# Patient Record
Sex: FEMALE | Race: WHITE | NOT HISPANIC OR LATINO | Employment: OTHER | URBAN - METROPOLITAN AREA
[De-identification: names, ages, dates, MRNs, and addresses within clinical notes are randomized per-mention and may not be internally consistent; named-entity substitution may affect disease eponyms.]

---

## 2017-01-04 ENCOUNTER — ALLSCRIPTS OFFICE VISIT (OUTPATIENT)
Dept: OTHER | Facility: OTHER | Age: 82
End: 2017-01-04

## 2017-01-17 ENCOUNTER — GENERIC CONVERSION - ENCOUNTER (OUTPATIENT)
Dept: OTHER | Facility: OTHER | Age: 82
End: 2017-01-17

## 2017-01-17 ENCOUNTER — APPOINTMENT (OUTPATIENT)
Dept: LAB | Facility: CLINIC | Age: 82
End: 2017-01-17
Payer: MEDICARE

## 2017-01-17 DIAGNOSIS — Z79.01 LONG TERM (CURRENT) USE OF ANTICOAGULANTS: ICD-10-CM

## 2017-01-17 DIAGNOSIS — I48.91 ATRIAL FIBRILLATION, UNSPECIFIED TYPE (HCC): ICD-10-CM

## 2017-01-17 DIAGNOSIS — M54.16 RADICULOPATHY OF LUMBAR REGION: ICD-10-CM

## 2017-01-17 DIAGNOSIS — I48.91 ATRIAL FIBRILLATION (HCC): ICD-10-CM

## 2017-01-17 DIAGNOSIS — E78.5 HYPERLIPIDEMIA: ICD-10-CM

## 2017-01-17 DIAGNOSIS — E11.9 TYPE 2 DIABETES MELLITUS WITHOUT COMPLICATIONS (HCC): ICD-10-CM

## 2017-01-17 DIAGNOSIS — I10 ESSENTIAL (PRIMARY) HYPERTENSION: ICD-10-CM

## 2017-01-17 LAB
ALBUMIN SERPL BCP-MCNC: 3.5 G/DL (ref 3.5–5)
ALP SERPL-CCNC: 45 U/L (ref 46–116)
ALT SERPL W P-5'-P-CCNC: 33 U/L (ref 12–78)
ANION GAP SERPL CALCULATED.3IONS-SCNC: 6 MMOL/L (ref 4–13)
AST SERPL W P-5'-P-CCNC: 19 U/L (ref 5–45)
BASOPHILS # BLD AUTO: 0.03 THOUSANDS/ΜL (ref 0–0.1)
BASOPHILS NFR BLD AUTO: 0 % (ref 0–1)
BILIRUB SERPL-MCNC: 0.54 MG/DL (ref 0.2–1)
BUN SERPL-MCNC: 22 MG/DL (ref 5–25)
CALCIUM SERPL-MCNC: 9.3 MG/DL (ref 8.3–10.1)
CHLORIDE SERPL-SCNC: 102 MMOL/L (ref 100–108)
CHOLEST SERPL-MCNC: 142 MG/DL (ref 50–200)
CO2 SERPL-SCNC: 32 MMOL/L (ref 21–32)
CREAT SERPL-MCNC: 0.71 MG/DL (ref 0.6–1.3)
CREAT UR-MCNC: 181 MG/DL
EOSINOPHIL # BLD AUTO: 0.08 THOUSAND/ΜL (ref 0–0.61)
EOSINOPHIL NFR BLD AUTO: 1 % (ref 0–6)
ERYTHROCYTE [DISTWIDTH] IN BLOOD BY AUTOMATED COUNT: 13.9 % (ref 11.6–15.1)
EST. AVERAGE GLUCOSE BLD GHB EST-MCNC: 105 MG/DL
GFR SERPL CREATININE-BSD FRML MDRD: >60 ML/MIN/1.73SQ M
GLUCOSE SERPL-MCNC: 116 MG/DL (ref 65–140)
HBA1C MFR BLD: 5.3 % (ref 4.2–6.3)
HCT VFR BLD AUTO: 41.6 % (ref 34.8–46.1)
HDLC SERPL-MCNC: 52 MG/DL (ref 40–60)
HGB BLD-MCNC: 13.5 G/DL (ref 11.5–15.4)
INR PPP: 3.63 (ref 0.86–1.16)
LDLC SERPL CALC-MCNC: 68 MG/DL (ref 0–100)
LYMPHOCYTES # BLD AUTO: 1.32 THOUSANDS/ΜL (ref 0.6–4.47)
LYMPHOCYTES NFR BLD AUTO: 19 % (ref 14–44)
MCH RBC QN AUTO: 29.7 PG (ref 26.8–34.3)
MCHC RBC AUTO-ENTMCNC: 32.5 G/DL (ref 31.4–37.4)
MCV RBC AUTO: 92 FL (ref 82–98)
MICROALBUMIN UR-MCNC: 60.6 MG/L (ref 0–20)
MICROALBUMIN/CREAT 24H UR: 33 MG/G CREATININE (ref 0–30)
MONOCYTES # BLD AUTO: 0.5 THOUSAND/ΜL (ref 0.17–1.22)
MONOCYTES NFR BLD AUTO: 7 % (ref 4–12)
NEUTROPHILS # BLD AUTO: 4.91 THOUSANDS/ΜL (ref 1.85–7.62)
NEUTS SEG NFR BLD AUTO: 73 % (ref 43–75)
NRBC BLD AUTO-RTO: 0 /100 WBCS
PLATELET # BLD AUTO: 293 THOUSANDS/UL (ref 149–390)
PMV BLD AUTO: 11 FL (ref 8.9–12.7)
POTASSIUM SERPL-SCNC: 3.6 MMOL/L (ref 3.5–5.3)
PROT SERPL-MCNC: 7.1 G/DL (ref 6.4–8.2)
PROTHROMBIN TIME: 35.3 SECONDS (ref 12–14.3)
RBC # BLD AUTO: 4.54 MILLION/UL (ref 3.81–5.12)
SODIUM SERPL-SCNC: 140 MMOL/L (ref 136–145)
TRIGL SERPL-MCNC: 111 MG/DL
TSH SERPL DL<=0.05 MIU/L-ACNC: 0.76 UIU/ML (ref 0.36–3.74)
WBC # BLD AUTO: 6.86 THOUSAND/UL (ref 4.31–10.16)

## 2017-01-17 PROCEDURE — 84443 ASSAY THYROID STIM HORMONE: CPT

## 2017-01-17 PROCEDURE — 36415 COLL VENOUS BLD VENIPUNCTURE: CPT

## 2017-01-17 PROCEDURE — 83036 HEMOGLOBIN GLYCOSYLATED A1C: CPT

## 2017-01-17 PROCEDURE — 80061 LIPID PANEL: CPT

## 2017-01-17 PROCEDURE — 80053 COMPREHEN METABOLIC PANEL: CPT

## 2017-01-17 PROCEDURE — 82570 ASSAY OF URINE CREATININE: CPT

## 2017-01-17 PROCEDURE — 85025 COMPLETE CBC W/AUTO DIFF WBC: CPT

## 2017-01-17 PROCEDURE — 85610 PROTHROMBIN TIME: CPT

## 2017-01-17 PROCEDURE — 82043 UR ALBUMIN QUANTITATIVE: CPT

## 2017-01-20 ENCOUNTER — GENERIC CONVERSION - ENCOUNTER (OUTPATIENT)
Dept: OTHER | Facility: OTHER | Age: 82
End: 2017-01-20

## 2017-01-25 ENCOUNTER — TRANSCRIBE ORDERS (OUTPATIENT)
Dept: ADMINISTRATIVE | Facility: HOSPITAL | Age: 82
End: 2017-01-25

## 2017-01-25 ENCOUNTER — HOSPITAL ENCOUNTER (OUTPATIENT)
Dept: MAMMOGRAPHY | Facility: MEDICAL CENTER | Age: 82
Discharge: HOME | End: 2017-01-25
Payer: MEDICARE

## 2017-01-25 ENCOUNTER — ALLSCRIPTS OFFICE VISIT (OUTPATIENT)
Dept: OTHER | Facility: OTHER | Age: 82
End: 2017-01-25

## 2017-01-25 ENCOUNTER — GENERIC CONVERSION - ENCOUNTER (OUTPATIENT)
Dept: OTHER | Facility: OTHER | Age: 82
End: 2017-01-25

## 2017-01-25 ENCOUNTER — APPOINTMENT (OUTPATIENT)
Dept: LAB | Facility: MEDICAL CENTER | Age: 82
End: 2017-01-25
Payer: MEDICARE

## 2017-01-25 DIAGNOSIS — I48.91 ATRIAL FIBRILLATION, UNSPECIFIED TYPE (HCC): ICD-10-CM

## 2017-01-25 DIAGNOSIS — Z12.31 ENCOUNTER FOR SCREENING MAMMOGRAM FOR MALIGNANT NEOPLASM OF BREAST: ICD-10-CM

## 2017-01-25 DIAGNOSIS — Z79.01 LONG TERM (CURRENT) USE OF ANTICOAGULANTS: ICD-10-CM

## 2017-01-25 LAB
INR PPP: 2.26 (ref 0.86–1.16)
PROTHROMBIN TIME: 24.7 SECONDS (ref 12–14.3)

## 2017-01-25 PROCEDURE — G0202 SCR MAMMO BI INCL CAD: HCPCS

## 2017-01-25 PROCEDURE — 36415 COLL VENOUS BLD VENIPUNCTURE: CPT

## 2017-01-25 PROCEDURE — 85610 PROTHROMBIN TIME: CPT

## 2017-01-27 ENCOUNTER — ALLSCRIPTS OFFICE VISIT (OUTPATIENT)
Dept: OTHER | Facility: OTHER | Age: 82
End: 2017-01-27

## 2017-02-08 ENCOUNTER — GENERIC CONVERSION - ENCOUNTER (OUTPATIENT)
Dept: OTHER | Facility: OTHER | Age: 82
End: 2017-02-08

## 2017-02-08 ENCOUNTER — APPOINTMENT (OUTPATIENT)
Dept: LAB | Facility: CLINIC | Age: 82
End: 2017-02-08
Payer: MEDICARE

## 2017-02-08 DIAGNOSIS — I48.91 ATRIAL FIBRILLATION, UNSPECIFIED TYPE (HCC): ICD-10-CM

## 2017-02-08 DIAGNOSIS — Z79.01 LONG TERM (CURRENT) USE OF ANTICOAGULANTS: ICD-10-CM

## 2017-02-08 LAB
INR PPP: 5.59 (ref 0.86–1.16)
PROTHROMBIN TIME: 49 SECONDS (ref 12–14.3)

## 2017-02-08 PROCEDURE — 85610 PROTHROMBIN TIME: CPT

## 2017-02-08 PROCEDURE — 36415 COLL VENOUS BLD VENIPUNCTURE: CPT

## 2017-02-10 ENCOUNTER — APPOINTMENT (OUTPATIENT)
Dept: LAB | Facility: CLINIC | Age: 82
End: 2017-02-10
Payer: MEDICARE

## 2017-02-10 ENCOUNTER — TRANSCRIBE ORDERS (OUTPATIENT)
Dept: LAB | Facility: CLINIC | Age: 82
End: 2017-02-10

## 2017-02-10 ENCOUNTER — GENERIC CONVERSION - ENCOUNTER (OUTPATIENT)
Dept: OTHER | Facility: OTHER | Age: 82
End: 2017-02-10

## 2017-02-10 DIAGNOSIS — Z79.01 LONG TERM (CURRENT) USE OF ANTICOAGULANTS: ICD-10-CM

## 2017-02-10 DIAGNOSIS — I48.91 ATRIAL FIBRILLATION, UNSPECIFIED TYPE (HCC): ICD-10-CM

## 2017-02-10 LAB
INR PPP: 2.87 (ref 0.86–1.16)
PROTHROMBIN TIME: 29.6 SECONDS (ref 12–14.3)

## 2017-02-10 PROCEDURE — 36415 COLL VENOUS BLD VENIPUNCTURE: CPT

## 2017-02-10 PROCEDURE — 85610 PROTHROMBIN TIME: CPT

## 2017-02-14 ENCOUNTER — GENERIC CONVERSION - ENCOUNTER (OUTPATIENT)
Dept: OTHER | Facility: OTHER | Age: 82
End: 2017-02-14

## 2017-02-14 ENCOUNTER — APPOINTMENT (OUTPATIENT)
Dept: LAB | Facility: CLINIC | Age: 82
End: 2017-02-14
Payer: MEDICARE

## 2017-02-14 DIAGNOSIS — Z79.01 LONG TERM (CURRENT) USE OF ANTICOAGULANTS: ICD-10-CM

## 2017-02-14 DIAGNOSIS — I48.91 ATRIAL FIBRILLATION, UNSPECIFIED TYPE (HCC): ICD-10-CM

## 2017-02-14 LAB
INR PPP: 1.47 (ref 0.86–1.16)
PROTHROMBIN TIME: 17.8 SECONDS (ref 12–14.3)

## 2017-02-14 PROCEDURE — 85610 PROTHROMBIN TIME: CPT

## 2017-02-14 PROCEDURE — 36415 COLL VENOUS BLD VENIPUNCTURE: CPT

## 2017-02-17 ENCOUNTER — APPOINTMENT (OUTPATIENT)
Dept: LAB | Facility: CLINIC | Age: 82
End: 2017-02-17
Payer: MEDICARE

## 2017-02-17 ENCOUNTER — TRANSCRIBE ORDERS (OUTPATIENT)
Dept: LAB | Facility: CLINIC | Age: 82
End: 2017-02-17

## 2017-02-17 ENCOUNTER — GENERIC CONVERSION - ENCOUNTER (OUTPATIENT)
Dept: OTHER | Facility: OTHER | Age: 82
End: 2017-02-17

## 2017-02-17 DIAGNOSIS — K42.9 UMBILICAL HERNIA WITHOUT OBSTRUCTION OR GANGRENE: Primary | ICD-10-CM

## 2017-02-17 DIAGNOSIS — Z95.2 HEART VALVE REPLACED BY TRANSPLANT: ICD-10-CM

## 2017-02-17 DIAGNOSIS — Z79.01 LONG TERM (CURRENT) USE OF ANTICOAGULANTS: ICD-10-CM

## 2017-02-17 DIAGNOSIS — I48.91 ATRIAL FIBRILLATION, UNSPECIFIED TYPE (HCC): ICD-10-CM

## 2017-02-17 LAB
INR PPP: 1.91 (ref 0.86–1.16)
PROTHROMBIN TIME: 21.7 SECONDS (ref 12–14.3)

## 2017-02-17 PROCEDURE — 85610 PROTHROMBIN TIME: CPT

## 2017-02-17 PROCEDURE — 36415 COLL VENOUS BLD VENIPUNCTURE: CPT

## 2017-02-24 ENCOUNTER — APPOINTMENT (OUTPATIENT)
Dept: LAB | Facility: CLINIC | Age: 82
End: 2017-02-24
Payer: MEDICARE

## 2017-02-24 ENCOUNTER — GENERIC CONVERSION - ENCOUNTER (OUTPATIENT)
Dept: OTHER | Facility: OTHER | Age: 82
End: 2017-02-24

## 2017-02-24 DIAGNOSIS — I48.91 ATRIAL FIBRILLATION, UNSPECIFIED TYPE (HCC): ICD-10-CM

## 2017-02-24 DIAGNOSIS — Z79.01 LONG TERM (CURRENT) USE OF ANTICOAGULANTS: ICD-10-CM

## 2017-02-24 LAB
INR PPP: 2.71 (ref 0.86–1.16)
PROTHROMBIN TIME: 28.3 SECONDS (ref 12–14.3)

## 2017-02-24 PROCEDURE — 36415 COLL VENOUS BLD VENIPUNCTURE: CPT

## 2017-02-24 PROCEDURE — 85610 PROTHROMBIN TIME: CPT

## 2017-02-27 ENCOUNTER — GENERIC CONVERSION - ENCOUNTER (OUTPATIENT)
Dept: OTHER | Facility: OTHER | Age: 82
End: 2017-02-27

## 2017-02-28 ENCOUNTER — APPOINTMENT (OUTPATIENT)
Dept: LAB | Facility: CLINIC | Age: 82
End: 2017-02-28
Payer: MEDICARE

## 2017-02-28 LAB
ALBUMIN SERPL BCP-MCNC: 3.6 G/DL (ref 3.5–5)
ALP SERPL-CCNC: 43 U/L (ref 46–116)
ALT SERPL W P-5'-P-CCNC: 28 U/L (ref 12–78)
ANION GAP SERPL CALCULATED.3IONS-SCNC: 4 MMOL/L (ref 4–13)
AST SERPL W P-5'-P-CCNC: 15 U/L (ref 5–45)
BASOPHILS # BLD AUTO: 0.03 THOUSANDS/ΜL (ref 0–0.1)
BASOPHILS NFR BLD AUTO: 0 % (ref 0–1)
BILIRUB SERPL-MCNC: 0.78 MG/DL (ref 0.2–1)
BUN SERPL-MCNC: 26 MG/DL (ref 5–25)
CALCIUM SERPL-MCNC: 9.5 MG/DL (ref 8.3–10.1)
CHLORIDE SERPL-SCNC: 103 MMOL/L (ref 100–108)
CO2 SERPL-SCNC: 33 MMOL/L (ref 21–32)
CREAT SERPL-MCNC: 0.8 MG/DL (ref 0.6–1.3)
EOSINOPHIL # BLD AUTO: 0.09 THOUSAND/ΜL (ref 0–0.61)
EOSINOPHIL NFR BLD AUTO: 1 % (ref 0–6)
ERYTHROCYTE [DISTWIDTH] IN BLOOD BY AUTOMATED COUNT: 14 % (ref 11.6–15.1)
GFR SERPL CREATININE-BSD FRML MDRD: >60 ML/MIN/1.73SQ M
GLUCOSE SERPL-MCNC: 110 MG/DL (ref 65–140)
HCT VFR BLD AUTO: 42.2 % (ref 34.8–46.1)
HGB BLD-MCNC: 13.9 G/DL (ref 11.5–15.4)
LYMPHOCYTES # BLD AUTO: 1.73 THOUSANDS/ΜL (ref 0.6–4.47)
LYMPHOCYTES NFR BLD AUTO: 22 % (ref 14–44)
MCH RBC QN AUTO: 29.9 PG (ref 26.8–34.3)
MCHC RBC AUTO-ENTMCNC: 32.9 G/DL (ref 31.4–37.4)
MCV RBC AUTO: 91 FL (ref 82–98)
MONOCYTES # BLD AUTO: 0.57 THOUSAND/ΜL (ref 0.17–1.22)
MONOCYTES NFR BLD AUTO: 7 % (ref 4–12)
NEUTROPHILS # BLD AUTO: 5.48 THOUSANDS/ΜL (ref 1.85–7.62)
NEUTS SEG NFR BLD AUTO: 70 % (ref 43–75)
NRBC BLD AUTO-RTO: 0 /100 WBCS
PLATELET # BLD AUTO: 304 THOUSANDS/UL (ref 149–390)
PMV BLD AUTO: 11.2 FL (ref 8.9–12.7)
POTASSIUM SERPL-SCNC: 3.7 MMOL/L (ref 3.5–5.3)
PROT SERPL-MCNC: 7.1 G/DL (ref 6.4–8.2)
RBC # BLD AUTO: 4.65 MILLION/UL (ref 3.81–5.12)
SODIUM SERPL-SCNC: 140 MMOL/L (ref 136–145)
WBC # BLD AUTO: 7.91 THOUSAND/UL (ref 4.31–10.16)

## 2017-02-28 PROCEDURE — 85025 COMPLETE CBC W/AUTO DIFF WBC: CPT

## 2017-02-28 PROCEDURE — 80053 COMPREHEN METABOLIC PANEL: CPT

## 2017-02-28 PROCEDURE — 36415 COLL VENOUS BLD VENIPUNCTURE: CPT

## 2017-03-03 ENCOUNTER — ALLSCRIPTS OFFICE VISIT (OUTPATIENT)
Dept: OTHER | Facility: OTHER | Age: 82
End: 2017-03-03

## 2017-03-09 ENCOUNTER — GENERIC CONVERSION - ENCOUNTER (OUTPATIENT)
Dept: OTHER | Facility: OTHER | Age: 82
End: 2017-03-09

## 2017-03-09 ENCOUNTER — APPOINTMENT (OUTPATIENT)
Dept: LAB | Facility: CLINIC | Age: 82
End: 2017-03-09
Payer: MEDICARE

## 2017-03-09 DIAGNOSIS — I48.91 ATRIAL FIBRILLATION, UNSPECIFIED TYPE (HCC): ICD-10-CM

## 2017-03-09 DIAGNOSIS — Z79.01 LONG TERM (CURRENT) USE OF ANTICOAGULANTS: ICD-10-CM

## 2017-03-09 LAB
INR PPP: 2.63 (ref 0.86–1.16)
PROTHROMBIN TIME: 27.7 SECONDS (ref 12–14.3)

## 2017-03-09 PROCEDURE — 36415 COLL VENOUS BLD VENIPUNCTURE: CPT

## 2017-03-09 PROCEDURE — 85610 PROTHROMBIN TIME: CPT

## 2017-03-16 ENCOUNTER — APPOINTMENT (OUTPATIENT)
Dept: LAB | Facility: CLINIC | Age: 82
End: 2017-03-16
Payer: MEDICARE

## 2017-03-16 ENCOUNTER — GENERIC CONVERSION - ENCOUNTER (OUTPATIENT)
Dept: OTHER | Facility: OTHER | Age: 82
End: 2017-03-16

## 2017-03-16 LAB
INR PPP: 1.22 (ref 0.86–1.16)
PROTHROMBIN TIME: 15.5 SECONDS (ref 12–14.3)

## 2017-03-16 PROCEDURE — 36415 COLL VENOUS BLD VENIPUNCTURE: CPT

## 2017-03-16 PROCEDURE — 85610 PROTHROMBIN TIME: CPT

## 2017-03-17 ENCOUNTER — HOSPITAL ENCOUNTER (OUTPATIENT)
Facility: HOSPITAL | Age: 82
Setting detail: OUTPATIENT SURGERY
Discharge: HOME | End: 2017-03-17
Attending: SURGERY | Admitting: SURGERY
Payer: MEDICARE

## 2017-03-17 ENCOUNTER — ANESTHESIA EVENT (OUTPATIENT)
Dept: PERIOP | Facility: HOSPITAL | Age: 82
End: 2017-03-17
Payer: MEDICARE

## 2017-03-17 ENCOUNTER — ANESTHESIA (OUTPATIENT)
Dept: PERIOP | Facility: HOSPITAL | Age: 82
End: 2017-03-17
Payer: MEDICARE

## 2017-03-17 VITALS
DIASTOLIC BLOOD PRESSURE: 67 MMHG | RESPIRATION RATE: 18 BRPM | SYSTOLIC BLOOD PRESSURE: 146 MMHG | HEART RATE: 54 BPM | OXYGEN SATURATION: 97 % | TEMPERATURE: 97.1 F

## 2017-03-17 PROCEDURE — C1781 MESH (IMPLANTABLE): HCPCS | Performed by: SURGERY

## 2017-03-17 DEVICE — VENTRALEX HERNIA PATCH, 6.4 CM (2.5"), MEDIUM CIRCLE WITH STRAP
Type: IMPLANTABLE DEVICE | Site: ABDOMEN | Status: FUNCTIONAL
Brand: VENTRALEX

## 2017-03-17 RX ORDER — SODIUM CHLORIDE, SODIUM LACTATE, POTASSIUM CHLORIDE, CALCIUM CHLORIDE 600; 310; 30; 20 MG/100ML; MG/100ML; MG/100ML; MG/100ML
INJECTION, SOLUTION INTRAVENOUS CONTINUOUS PRN
Status: DISCONTINUED | OUTPATIENT
Start: 2017-03-17 | End: 2017-03-17 | Stop reason: SURG

## 2017-03-17 RX ORDER — MORPHINE SULFATE 4 MG/ML
4 INJECTION, SOLUTION INTRAMUSCULAR; INTRAVENOUS
Status: DISCONTINUED | OUTPATIENT
Start: 2017-03-17 | End: 2017-03-17 | Stop reason: HOSPADM

## 2017-03-17 RX ORDER — WARFARIN SODIUM 5 MG/1
5 TABLET ORAL
COMMUNITY
End: 2018-01-24 | Stop reason: SDUPTHER

## 2017-03-17 RX ORDER — TRAMADOL HYDROCHLORIDE 50 MG/1
50 TABLET ORAL EVERY 6 HOURS PRN
COMMUNITY
End: 2018-01-24

## 2017-03-17 RX ORDER — PROPOFOL 10 MG/ML
INJECTION, EMULSION INTRAVENOUS AS NEEDED
Status: DISCONTINUED | OUTPATIENT
Start: 2017-03-17 | End: 2017-03-17 | Stop reason: SURG

## 2017-03-17 RX ORDER — FENTANYL CITRATE/PF 50 MCG/ML
25 SYRINGE (ML) INJECTION
Status: DISCONTINUED | OUTPATIENT
Start: 2017-03-17 | End: 2017-03-17 | Stop reason: HOSPADM

## 2017-03-17 RX ORDER — OXYCODONE HYDROCHLORIDE AND ACETAMINOPHEN 5; 325 MG/1; MG/1
2 TABLET ORAL EVERY 4 HOURS PRN
Status: DISCONTINUED | OUTPATIENT
Start: 2017-03-17 | End: 2017-03-17 | Stop reason: HOSPADM

## 2017-03-17 RX ORDER — FENTANYL CITRATE 50 UG/ML
INJECTION, SOLUTION INTRAMUSCULAR; INTRAVENOUS AS NEEDED
Status: DISCONTINUED | OUTPATIENT
Start: 2017-03-17 | End: 2017-03-17 | Stop reason: SURG

## 2017-03-17 RX ORDER — METFORMIN HYDROCHLORIDE EXTENDED-RELEASE TABLETS 500 MG/1
500 TABLET, FILM COATED, EXTENDED RELEASE ORAL
COMMUNITY
End: 2018-01-24

## 2017-03-17 RX ORDER — SODIUM CHLORIDE, SODIUM LACTATE, POTASSIUM CHLORIDE, CALCIUM CHLORIDE 600; 310; 30; 20 MG/100ML; MG/100ML; MG/100ML; MG/100ML
75 INJECTION, SOLUTION INTRAVENOUS CONTINUOUS
Status: DISCONTINUED | OUTPATIENT
Start: 2017-03-17 | End: 2017-03-17 | Stop reason: HOSPADM

## 2017-03-17 RX ORDER — ONDANSETRON 2 MG/ML
INJECTION INTRAMUSCULAR; INTRAVENOUS AS NEEDED
Status: DISCONTINUED | OUTPATIENT
Start: 2017-03-17 | End: 2017-03-17 | Stop reason: SURG

## 2017-03-17 RX ORDER — ATENOLOL 50 MG/1
50 TABLET ORAL DAILY
COMMUNITY
End: 2018-02-02 | Stop reason: SDUPTHER

## 2017-03-17 RX ORDER — ONDANSETRON 2 MG/ML
4 INJECTION INTRAMUSCULAR; INTRAVENOUS EVERY 4 HOURS PRN
Status: DISCONTINUED | OUTPATIENT
Start: 2017-03-17 | End: 2017-03-17 | Stop reason: HOSPADM

## 2017-03-17 RX ORDER — EPHEDRINE SULFATE 50 MG/ML
INJECTION, SOLUTION INTRAVENOUS AS NEEDED
Status: DISCONTINUED | OUTPATIENT
Start: 2017-03-17 | End: 2017-03-17 | Stop reason: SURG

## 2017-03-17 RX ORDER — BUPIVACAINE HYDROCHLORIDE AND EPINEPHRINE 2.5; 5 MG/ML; UG/ML
INJECTION, SOLUTION EPIDURAL; INFILTRATION; INTRACAUDAL; PERINEURAL AS NEEDED
Status: DISCONTINUED | OUTPATIENT
Start: 2017-03-17 | End: 2017-03-17 | Stop reason: HOSPADM

## 2017-03-17 RX ORDER — GABAPENTIN 300 MG/1
300 CAPSULE ORAL 3 TIMES DAILY
COMMUNITY
End: 2018-01-24

## 2017-03-17 RX ORDER — ONDANSETRON 2 MG/ML
4 INJECTION INTRAMUSCULAR; INTRAVENOUS ONCE AS NEEDED
Status: DISCONTINUED | OUTPATIENT
Start: 2017-03-17 | End: 2017-03-17 | Stop reason: HOSPADM

## 2017-03-17 RX ORDER — PRAVASTATIN SODIUM 40 MG
40 TABLET ORAL DAILY
COMMUNITY
End: 2018-01-24

## 2017-03-17 RX ADMIN — FENTANYL CITRATE 25 MCG: 50 INJECTION INTRAMUSCULAR; INTRAVENOUS at 14:01

## 2017-03-17 RX ADMIN — SODIUM CHLORIDE, SODIUM LACTATE, POTASSIUM CHLORIDE, AND CALCIUM CHLORIDE: .6; .31; .03; .02 INJECTION, SOLUTION INTRAVENOUS at 13:50

## 2017-03-17 RX ADMIN — LIDOCAINE HYDROCHLORIDE 100 MG: 20 INJECTION, SOLUTION INTRAVENOUS at 13:56

## 2017-03-17 RX ADMIN — ONDANSETRON 4 MG: 2 INJECTION INTRAMUSCULAR; INTRAVENOUS at 13:56

## 2017-03-17 RX ADMIN — EPHEDRINE SULFATE 5 MG: 50 INJECTION, SOLUTION INTRAMUSCULAR; INTRAVENOUS; SUBCUTANEOUS at 14:07

## 2017-03-17 RX ADMIN — FENTANYL CITRATE 25 MCG: 50 INJECTION INTRAMUSCULAR; INTRAVENOUS at 14:10

## 2017-03-17 RX ADMIN — CEFAZOLIN SODIUM 1000 MG: 1 SOLUTION INTRAVENOUS at 14:01

## 2017-03-17 RX ADMIN — FENTANYL CITRATE 25 MCG: 50 INJECTION INTRAMUSCULAR; INTRAVENOUS at 13:56

## 2017-03-17 RX ADMIN — PROPOFOL 150 MG: 10 INJECTION, EMULSION INTRAVENOUS at 13:56

## 2017-03-17 RX ADMIN — FENTANYL CITRATE 25 MCG: 50 INJECTION INTRAMUSCULAR; INTRAVENOUS at 14:15

## 2017-03-20 ENCOUNTER — GENERIC CONVERSION - ENCOUNTER (OUTPATIENT)
Dept: OTHER | Facility: OTHER | Age: 82
End: 2017-03-20

## 2017-03-20 ENCOUNTER — APPOINTMENT (OUTPATIENT)
Dept: LAB | Facility: CLINIC | Age: 82
End: 2017-03-20
Payer: MEDICARE

## 2017-03-20 DIAGNOSIS — I48.91 ATRIAL FIBRILLATION, UNSPECIFIED TYPE (HCC): ICD-10-CM

## 2017-03-20 DIAGNOSIS — Z79.01 LONG TERM (CURRENT) USE OF ANTICOAGULANTS: ICD-10-CM

## 2017-03-20 LAB
INR PPP: 1.83 (ref 0.86–1.16)
PROTHROMBIN TIME: 21 SECONDS (ref 12–14.3)

## 2017-03-20 PROCEDURE — 85610 PROTHROMBIN TIME: CPT

## 2017-03-20 PROCEDURE — 36415 COLL VENOUS BLD VENIPUNCTURE: CPT

## 2017-03-23 ENCOUNTER — APPOINTMENT (OUTPATIENT)
Dept: LAB | Facility: CLINIC | Age: 82
End: 2017-03-23
Payer: MEDICARE

## 2017-03-23 ENCOUNTER — TRANSCRIBE ORDERS (OUTPATIENT)
Dept: LAB | Facility: CLINIC | Age: 82
End: 2017-03-23

## 2017-03-23 ENCOUNTER — GENERIC CONVERSION - ENCOUNTER (OUTPATIENT)
Dept: OTHER | Facility: OTHER | Age: 82
End: 2017-03-23

## 2017-03-23 DIAGNOSIS — Z79.01 LONG TERM (CURRENT) USE OF ANTICOAGULANTS: Primary | ICD-10-CM

## 2017-03-23 DIAGNOSIS — Z95.2 HEART VALVE REPLACED BY TRANSPLANT: ICD-10-CM

## 2017-03-23 DIAGNOSIS — I48.91 ATRIAL FIBRILLATION, UNSPECIFIED TYPE (HCC): ICD-10-CM

## 2017-03-23 LAB
INR PPP: 2.72 (ref 0.86–1.16)
PROTHROMBIN TIME: 28.4 SECONDS (ref 12–14.3)

## 2017-03-23 PROCEDURE — 85610 PROTHROMBIN TIME: CPT

## 2017-03-23 PROCEDURE — 36415 COLL VENOUS BLD VENIPUNCTURE: CPT

## 2017-03-30 ENCOUNTER — APPOINTMENT (OUTPATIENT)
Dept: LAB | Facility: CLINIC | Age: 82
End: 2017-03-30
Payer: MEDICARE

## 2017-03-30 ENCOUNTER — GENERIC CONVERSION - ENCOUNTER (OUTPATIENT)
Dept: OTHER | Facility: OTHER | Age: 82
End: 2017-03-30

## 2017-03-30 DIAGNOSIS — I48.91 ATRIAL FIBRILLATION, UNSPECIFIED TYPE (HCC): ICD-10-CM

## 2017-03-30 DIAGNOSIS — Z79.01 LONG TERM (CURRENT) USE OF ANTICOAGULANTS: ICD-10-CM

## 2017-03-30 LAB
INR PPP: 3.03 (ref 0.86–1.16)
PROTHROMBIN TIME: 30.8 SECONDS (ref 12–14.3)

## 2017-03-30 PROCEDURE — 85610 PROTHROMBIN TIME: CPT

## 2017-03-30 PROCEDURE — 36415 COLL VENOUS BLD VENIPUNCTURE: CPT

## 2017-04-03 ENCOUNTER — GENERIC CONVERSION - ENCOUNTER (OUTPATIENT)
Dept: OTHER | Facility: OTHER | Age: 82
End: 2017-04-03

## 2017-04-17 ENCOUNTER — APPOINTMENT (OUTPATIENT)
Dept: LAB | Facility: CLINIC | Age: 82
End: 2017-04-17
Payer: MEDICARE

## 2017-04-17 ENCOUNTER — GENERIC CONVERSION - ENCOUNTER (OUTPATIENT)
Dept: OTHER | Facility: OTHER | Age: 82
End: 2017-04-17

## 2017-04-17 DIAGNOSIS — Z79.01 LONG TERM (CURRENT) USE OF ANTICOAGULANTS: ICD-10-CM

## 2017-04-17 DIAGNOSIS — I48.91 ATRIAL FIBRILLATION, UNSPECIFIED TYPE (HCC): ICD-10-CM

## 2017-04-17 LAB
INR PPP: 3.63 (ref 0.86–1.16)
PROTHROMBIN TIME: 35.3 SECONDS (ref 12–14.3)

## 2017-04-17 PROCEDURE — 36415 COLL VENOUS BLD VENIPUNCTURE: CPT

## 2017-04-17 PROCEDURE — 85610 PROTHROMBIN TIME: CPT

## 2017-04-19 ENCOUNTER — ALLSCRIPTS OFFICE VISIT (OUTPATIENT)
Dept: OTHER | Facility: OTHER | Age: 82
End: 2017-04-19

## 2017-04-19 DIAGNOSIS — Z79.891 LONG TERM CURRENT USE OF OPIATE ANALGESIC: ICD-10-CM

## 2017-04-19 DIAGNOSIS — G89.4 CHRONIC PAIN SYNDROME: ICD-10-CM

## 2017-04-19 DIAGNOSIS — F11.20 UNCOMPLICATED OPIOID DEPENDENCE (HCC): ICD-10-CM

## 2017-04-26 ENCOUNTER — APPOINTMENT (OUTPATIENT)
Dept: LAB | Facility: CLINIC | Age: 82
End: 2017-04-26
Payer: MEDICARE

## 2017-04-26 ENCOUNTER — GENERIC CONVERSION - ENCOUNTER (OUTPATIENT)
Dept: OTHER | Facility: OTHER | Age: 82
End: 2017-04-26

## 2017-04-26 ENCOUNTER — TRANSCRIBE ORDERS (OUTPATIENT)
Dept: LAB | Facility: CLINIC | Age: 82
End: 2017-04-26

## 2017-04-26 DIAGNOSIS — I48.91 ATRIAL FIBRILLATION, UNSPECIFIED TYPE (HCC): ICD-10-CM

## 2017-04-26 DIAGNOSIS — Z79.01 LONG TERM (CURRENT) USE OF ANTICOAGULANTS: ICD-10-CM

## 2017-04-26 LAB
INR PPP: 1.69 (ref 0.86–1.16)
PROTHROMBIN TIME: 19.8 SECONDS (ref 12–14.3)

## 2017-04-26 PROCEDURE — 85610 PROTHROMBIN TIME: CPT

## 2017-04-26 PROCEDURE — 36415 COLL VENOUS BLD VENIPUNCTURE: CPT

## 2017-05-03 ENCOUNTER — GENERIC CONVERSION - ENCOUNTER (OUTPATIENT)
Dept: OTHER | Facility: OTHER | Age: 82
End: 2017-05-03

## 2017-05-03 ENCOUNTER — APPOINTMENT (OUTPATIENT)
Dept: LAB | Facility: CLINIC | Age: 82
End: 2017-05-03
Payer: MEDICARE

## 2017-05-03 DIAGNOSIS — I48.91 ATRIAL FIBRILLATION, UNSPECIFIED TYPE (HCC): ICD-10-CM

## 2017-05-03 DIAGNOSIS — Z79.01 LONG TERM (CURRENT) USE OF ANTICOAGULANTS: ICD-10-CM

## 2017-05-03 LAB
INR PPP: 1.78 (ref 0.86–1.16)
PROTHROMBIN TIME: 20.9 SECONDS (ref 12.1–14.4)

## 2017-05-03 PROCEDURE — 36415 COLL VENOUS BLD VENIPUNCTURE: CPT

## 2017-05-03 PROCEDURE — 85610 PROTHROMBIN TIME: CPT

## 2017-05-10 ENCOUNTER — APPOINTMENT (OUTPATIENT)
Dept: LAB | Facility: CLINIC | Age: 82
End: 2017-05-10
Payer: MEDICARE

## 2017-05-10 ENCOUNTER — GENERIC CONVERSION - ENCOUNTER (OUTPATIENT)
Dept: OTHER | Facility: OTHER | Age: 82
End: 2017-05-10

## 2017-05-10 DIAGNOSIS — I48.91 ATRIAL FIBRILLATION, UNSPECIFIED TYPE (HCC): ICD-10-CM

## 2017-05-10 DIAGNOSIS — Z79.01 LONG TERM (CURRENT) USE OF ANTICOAGULANTS: ICD-10-CM

## 2017-05-10 LAB
INR PPP: 2.74 (ref 0.86–1.16)
PROTHROMBIN TIME: 29.4 SECONDS (ref 12.1–14.4)

## 2017-05-10 PROCEDURE — 85610 PROTHROMBIN TIME: CPT

## 2017-05-10 PROCEDURE — 36415 COLL VENOUS BLD VENIPUNCTURE: CPT

## 2017-05-24 ENCOUNTER — GENERIC CONVERSION - ENCOUNTER (OUTPATIENT)
Dept: OTHER | Facility: OTHER | Age: 82
End: 2017-05-24

## 2017-05-24 ENCOUNTER — APPOINTMENT (OUTPATIENT)
Dept: LAB | Facility: CLINIC | Age: 82
End: 2017-05-24
Payer: MEDICARE

## 2017-05-24 DIAGNOSIS — I48.91 ATRIAL FIBRILLATION, UNSPECIFIED TYPE (HCC): ICD-10-CM

## 2017-05-24 DIAGNOSIS — Z79.01 LONG TERM (CURRENT) USE OF ANTICOAGULANTS: ICD-10-CM

## 2017-05-24 LAB
INR PPP: 2.24 (ref 0.86–1.16)
PROTHROMBIN TIME: 25 SECONDS (ref 12.1–14.4)

## 2017-05-24 PROCEDURE — 85610 PROTHROMBIN TIME: CPT

## 2017-05-24 PROCEDURE — 36415 COLL VENOUS BLD VENIPUNCTURE: CPT

## 2017-05-27 DIAGNOSIS — I10 ESSENTIAL (PRIMARY) HYPERTENSION: ICD-10-CM

## 2017-05-27 DIAGNOSIS — E11.9 TYPE 2 DIABETES MELLITUS WITHOUT COMPLICATIONS (HCC): ICD-10-CM

## 2017-05-27 DIAGNOSIS — I48.91 ATRIAL FIBRILLATION (HCC): ICD-10-CM

## 2017-05-27 DIAGNOSIS — E55.9 VITAMIN D DEFICIENCY: ICD-10-CM

## 2017-05-27 DIAGNOSIS — E78.5 HYPERLIPIDEMIA: ICD-10-CM

## 2017-05-27 DIAGNOSIS — I50.9 HEART FAILURE (HCC): ICD-10-CM

## 2017-06-12 ENCOUNTER — TRANSCRIBE ORDERS (OUTPATIENT)
Dept: LAB | Facility: CLINIC | Age: 82
End: 2017-06-12

## 2017-06-12 ENCOUNTER — APPOINTMENT (OUTPATIENT)
Dept: LAB | Facility: CLINIC | Age: 82
End: 2017-06-12
Payer: MEDICARE

## 2017-06-12 DIAGNOSIS — I48.91 ATRIAL FIBRILLATION, UNSPECIFIED TYPE (HCC): ICD-10-CM

## 2017-06-12 DIAGNOSIS — Z79.01 LONG TERM (CURRENT) USE OF ANTICOAGULANTS: ICD-10-CM

## 2017-06-12 LAB
INR PPP: 5.6 (ref 0.86–1.16)
PROTHROMBIN TIME: 51.8 SECONDS (ref 12.1–14.4)

## 2017-06-12 PROCEDURE — 36415 COLL VENOUS BLD VENIPUNCTURE: CPT

## 2017-06-12 PROCEDURE — 85610 PROTHROMBIN TIME: CPT

## 2017-06-14 ENCOUNTER — APPOINTMENT (OUTPATIENT)
Dept: LAB | Facility: CLINIC | Age: 82
End: 2017-06-14
Payer: MEDICARE

## 2017-06-14 ENCOUNTER — GENERIC CONVERSION - ENCOUNTER (OUTPATIENT)
Dept: OTHER | Facility: OTHER | Age: 82
End: 2017-06-14

## 2017-06-14 DIAGNOSIS — Z79.01 LONG TERM (CURRENT) USE OF ANTICOAGULANTS: ICD-10-CM

## 2017-06-14 DIAGNOSIS — I48.91 ATRIAL FIBRILLATION, UNSPECIFIED TYPE (HCC): ICD-10-CM

## 2017-06-14 LAB
INR PPP: 2.18 (ref 0.86–1.16)
PROTHROMBIN TIME: 24.5 SECONDS (ref 12.1–14.4)

## 2017-06-14 PROCEDURE — 36415 COLL VENOUS BLD VENIPUNCTURE: CPT

## 2017-06-14 PROCEDURE — 85610 PROTHROMBIN TIME: CPT

## 2017-06-19 ENCOUNTER — ALLSCRIPTS OFFICE VISIT (OUTPATIENT)
Dept: OTHER | Facility: OTHER | Age: 82
End: 2017-06-19

## 2017-06-21 ENCOUNTER — GENERIC CONVERSION - ENCOUNTER (OUTPATIENT)
Dept: OTHER | Facility: OTHER | Age: 82
End: 2017-06-21

## 2017-06-21 ENCOUNTER — APPOINTMENT (OUTPATIENT)
Dept: LAB | Facility: CLINIC | Age: 82
End: 2017-06-21
Payer: MEDICARE

## 2017-06-21 DIAGNOSIS — I48.91 ATRIAL FIBRILLATION, UNSPECIFIED TYPE (HCC): ICD-10-CM

## 2017-06-21 DIAGNOSIS — Z79.01 LONG TERM (CURRENT) USE OF ANTICOAGULANTS: ICD-10-CM

## 2017-06-21 LAB
INR PPP: 1.65 (ref 0.86–1.16)
PROTHROMBIN TIME: 19.6 SECONDS (ref 12.1–14.4)

## 2017-06-21 PROCEDURE — 85610 PROTHROMBIN TIME: CPT

## 2017-06-21 PROCEDURE — 36415 COLL VENOUS BLD VENIPUNCTURE: CPT

## 2017-06-28 ENCOUNTER — GENERIC CONVERSION - ENCOUNTER (OUTPATIENT)
Dept: OTHER | Facility: OTHER | Age: 82
End: 2017-06-28

## 2017-06-28 ENCOUNTER — APPOINTMENT (OUTPATIENT)
Dept: LAB | Facility: CLINIC | Age: 82
End: 2017-06-28
Payer: MEDICARE

## 2017-06-28 DIAGNOSIS — I48.91 ATRIAL FIBRILLATION, UNSPECIFIED TYPE (HCC): ICD-10-CM

## 2017-06-28 DIAGNOSIS — Z79.01 LONG TERM (CURRENT) USE OF ANTICOAGULANTS: ICD-10-CM

## 2017-06-28 LAB
INR PPP: 2.51 (ref 0.86–1.16)
PROTHROMBIN TIME: 27.4 SECONDS (ref 12.1–14.4)

## 2017-06-28 PROCEDURE — 85610 PROTHROMBIN TIME: CPT

## 2017-06-28 PROCEDURE — 36415 COLL VENOUS BLD VENIPUNCTURE: CPT

## 2017-07-05 ENCOUNTER — ALLSCRIPTS OFFICE VISIT (OUTPATIENT)
Dept: OTHER | Facility: OTHER | Age: 82
End: 2017-07-05

## 2017-07-05 DIAGNOSIS — E78.5 HYPERLIPIDEMIA: ICD-10-CM

## 2017-07-10 ENCOUNTER — GENERIC CONVERSION - ENCOUNTER (OUTPATIENT)
Dept: OTHER | Facility: OTHER | Age: 82
End: 2017-07-10

## 2017-07-10 ENCOUNTER — APPOINTMENT (OUTPATIENT)
Dept: LAB | Facility: CLINIC | Age: 82
End: 2017-07-10
Payer: MEDICARE

## 2017-07-10 DIAGNOSIS — Z79.01 LONG TERM (CURRENT) USE OF ANTICOAGULANTS: ICD-10-CM

## 2017-07-10 DIAGNOSIS — I48.91 ATRIAL FIBRILLATION, UNSPECIFIED TYPE (HCC): ICD-10-CM

## 2017-07-10 LAB
INR PPP: 2.68 (ref 0.86–1.16)
PROTHROMBIN TIME: 28.9 SECONDS (ref 12.1–14.4)

## 2017-07-10 PROCEDURE — 85610 PROTHROMBIN TIME: CPT

## 2017-07-10 PROCEDURE — 36415 COLL VENOUS BLD VENIPUNCTURE: CPT

## 2017-07-12 ENCOUNTER — ALLSCRIPTS OFFICE VISIT (OUTPATIENT)
Dept: OTHER | Facility: OTHER | Age: 82
End: 2017-07-12

## 2017-07-31 ENCOUNTER — GENERIC CONVERSION - ENCOUNTER (OUTPATIENT)
Dept: OTHER | Facility: OTHER | Age: 82
End: 2017-07-31

## 2017-07-31 ENCOUNTER — TRANSCRIBE ORDERS (OUTPATIENT)
Dept: LAB | Facility: CLINIC | Age: 82
End: 2017-07-31

## 2017-07-31 ENCOUNTER — APPOINTMENT (OUTPATIENT)
Dept: LAB | Facility: CLINIC | Age: 82
End: 2017-07-31
Payer: MEDICARE

## 2017-07-31 DIAGNOSIS — I48.91 ATRIAL FIBRILLATION, UNSPECIFIED TYPE (HCC): ICD-10-CM

## 2017-07-31 DIAGNOSIS — Z79.01 LONG TERM (CURRENT) USE OF ANTICOAGULANTS: ICD-10-CM

## 2017-07-31 LAB
INR PPP: 3.17 (ref 0.86–1.16)
PROTHROMBIN TIME: 33 SECONDS (ref 12.1–14.4)

## 2017-07-31 PROCEDURE — 36415 COLL VENOUS BLD VENIPUNCTURE: CPT

## 2017-07-31 PROCEDURE — 85610 PROTHROMBIN TIME: CPT

## 2017-08-28 ENCOUNTER — TRANSCRIBE ORDERS (OUTPATIENT)
Dept: LAB | Facility: CLINIC | Age: 82
End: 2017-08-28

## 2017-08-28 ENCOUNTER — GENERIC CONVERSION - ENCOUNTER (OUTPATIENT)
Dept: OTHER | Facility: OTHER | Age: 82
End: 2017-08-28

## 2017-08-28 ENCOUNTER — APPOINTMENT (OUTPATIENT)
Dept: LAB | Facility: CLINIC | Age: 82
End: 2017-08-28
Payer: MEDICARE

## 2017-08-28 DIAGNOSIS — Z95.2 HEART VALVE REPLACED BY TRANSPLANT: ICD-10-CM

## 2017-08-28 DIAGNOSIS — I48.91 ATRIAL FIBRILLATION, UNSPECIFIED TYPE (HCC): ICD-10-CM

## 2017-08-28 DIAGNOSIS — Z79.01 LONG TERM (CURRENT) USE OF ANTICOAGULANTS: ICD-10-CM

## 2017-08-28 DIAGNOSIS — Z79.01 LONG TERM (CURRENT) USE OF ANTICOAGULANTS: Primary | ICD-10-CM

## 2017-08-28 LAB
INR PPP: 3.32 (ref 0.86–1.16)
PROTHROMBIN TIME: 34.2 SECONDS (ref 12.1–14.4)

## 2017-08-28 PROCEDURE — 85610 PROTHROMBIN TIME: CPT

## 2017-08-28 PROCEDURE — 36415 COLL VENOUS BLD VENIPUNCTURE: CPT

## 2017-09-26 ENCOUNTER — APPOINTMENT (OUTPATIENT)
Dept: LAB | Facility: CLINIC | Age: 82
End: 2017-09-26
Payer: MEDICARE

## 2017-09-26 ENCOUNTER — GENERIC CONVERSION - ENCOUNTER (OUTPATIENT)
Dept: OTHER | Facility: OTHER | Age: 82
End: 2017-09-26

## 2017-09-26 DIAGNOSIS — Z79.01 LONG TERM (CURRENT) USE OF ANTICOAGULANTS: ICD-10-CM

## 2017-09-26 DIAGNOSIS — I48.91 ATRIAL FIBRILLATION, UNSPECIFIED TYPE (HCC): ICD-10-CM

## 2017-09-26 LAB
INR PPP: 3.87 (ref 0.86–1.16)
PROTHROMBIN TIME: 38.6 SECONDS (ref 12.1–14.4)

## 2017-09-26 PROCEDURE — 85610 PROTHROMBIN TIME: CPT

## 2017-09-26 PROCEDURE — 36415 COLL VENOUS BLD VENIPUNCTURE: CPT

## 2017-10-03 ENCOUNTER — APPOINTMENT (OUTPATIENT)
Dept: LAB | Facility: CLINIC | Age: 82
End: 2017-10-03
Payer: MEDICARE

## 2017-10-03 ENCOUNTER — GENERIC CONVERSION - ENCOUNTER (OUTPATIENT)
Dept: OTHER | Facility: OTHER | Age: 82
End: 2017-10-03

## 2017-10-03 ENCOUNTER — TRANSCRIBE ORDERS (OUTPATIENT)
Dept: LAB | Facility: CLINIC | Age: 82
End: 2017-10-03

## 2017-10-03 DIAGNOSIS — I48.91 ATRIAL FIBRILLATION, UNSPECIFIED TYPE (HCC): ICD-10-CM

## 2017-10-03 DIAGNOSIS — Z79.01 LONG TERM (CURRENT) USE OF ANTICOAGULANTS: ICD-10-CM

## 2017-10-03 LAB
INR PPP: 3.06 (ref 0.86–1.16)
PROTHROMBIN TIME: 32.1 SECONDS (ref 12.1–14.4)

## 2017-10-03 PROCEDURE — 36415 COLL VENOUS BLD VENIPUNCTURE: CPT

## 2017-10-03 PROCEDURE — 85610 PROTHROMBIN TIME: CPT

## 2017-10-04 ENCOUNTER — GENERIC CONVERSION - ENCOUNTER (OUTPATIENT)
Dept: OTHER | Facility: OTHER | Age: 82
End: 2017-10-04

## 2017-10-04 ENCOUNTER — ALLSCRIPTS OFFICE VISIT (OUTPATIENT)
Dept: OTHER | Facility: OTHER | Age: 82
End: 2017-10-04

## 2017-10-04 DIAGNOSIS — Z79.899 OTHER LONG TERM (CURRENT) DRUG THERAPY: ICD-10-CM

## 2017-10-04 DIAGNOSIS — F11.20 UNCOMPLICATED OPIOID DEPENDENCE (HCC): ICD-10-CM

## 2017-10-04 DIAGNOSIS — M48.062 SPINAL STENOSIS OF LUMBAR REGION WITH NEUROGENIC CLAUDICATION: ICD-10-CM

## 2017-10-04 DIAGNOSIS — M54.50 LOW BACK PAIN: ICD-10-CM

## 2017-10-04 DIAGNOSIS — Z79.891 LONG TERM CURRENT USE OF OPIATE ANALGESIC: ICD-10-CM

## 2017-10-05 NOTE — PROGRESS NOTES
Assessment  1  Pain syndrome, chronic (338 4) (G89 4)   2  Sacroiliitis (720 2) (M46 1)   3  Chronic bilateral low back pain (724 2,338 29) (M54 5,G89 29)   4  Chronic lumbar radiculopathy (724 4) (M54 16)   5  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 myelopathy or radiculopathy (721 3)   (M47 817)    Plan   Analgesic use, Encounter for long-term use of opiate analgesic, Uncomplicated opioid  dependence    · (1) DRUG ABUSE SCREEN, URINE ROUTINE; Status:Active; Requested MXA:79UOD5145;    Perform:[facility]; JOHANNA:[id number]; Ordered; For:Analgesic use, Encounter for long-term use of opiate analgesic, Uncomplicated opioid dependence; Ordered By:Shital Denton;  Chronic bilateral low back pain, Lumbar stenosis with neurogenic claudication    · *1 - [facility]  *consult and treat 1-2x/week for 4-6 weeks for  back pain  Please include strengthening and postural exercises  Status: Active   Requested for: [id number]   Ordered; For: Chronic bilateral low back pain, Lumbar stenosis with neurogenic claudication; Ordered By: Judd Reddy Performed:  Due: 14OPE5320  Care Summary provided  : Yes    Follow-up visit in 3 months Evaluation and Treatment  Follow-up with AO for med refills  Status: Hold For - Scheduling  Requested for: 72KCJ7141  Ordered; For: Chronic bilateral low back pain;  Ordered By: Judd Reddy  Performed:   Due: 64RNI1346     Discussion/Summary    The patient presents today for a follow-up office visit  The patient is currently being treated for her sacroiliitis, low back pain, lumbar radiculopathy, and spondylosis of lumbosacral region without myelopathy or radiculopathy   The patient has been taking gabapentin 300 mg 2 tablets 3 times a day, along with tramadol 50 mg she only takes this on an as-needed basis  She reports an overall 50% relief of her pain symptoms at the current medications and no side effects or issues    this time, the patient does not require any refills  She can continue her medications as prescribed  tells me that her back pain is worsening, we did discuss physical therapy to work on strengthening, and postural exercises  The patient is very interested and would like to try this  PDMP was reviewed today and was appropriate  will see the patient back in 3 months for medication refills  Patient is able to Self-Care  The treatment plan was reviewed with the patient/guardian  The patient/guardian understands and agrees with the treatment plan     There are risks associated with opiod medications, including dependence, addiction and tolerance  The patient understands and agrees to use these medications only as prescribed  Potential side effects of the medications include, but are not limited to, constipation, drowsiness, addiction, impaired judgment and risk of fatal overdose if not taken as prescribed  Sharing medications is a felony  At this point and time, the patient is showing no signs of addiction, abuse, diversion or suicidal ideation  Chief Complaint  1  Back Pain  right low back and right leg pain; worsened      History of Present Illness  The patient presents today for a follow-up office visit  The patient is currently being treated for her sacroiliitis, low back pain, lumbar radiculopathy, and spondylosis of lumbosacral region without myelopathy or radiculopathy   The patient has been taking gabapentin 300 mg 2 tablets 3 times a day, along with tramadol 50 mg she only takes this on an as-needed basis  She reports an overall 50% relief of her pain symptoms at the current medications and no side effects or issues  the patient rates her pain 8/10, this is constant in nature most bothersome in the morning  She describes her pain as burning, and pressure like  She localizes her pain across her low back and into her bilateral lower extremities    have personally reviewed and/or updated the patient's past medical history, past surgical history, family history, social history, current medications, allergies, and vital signs today  Vicky Valentine presents with complaints of constant episodes of bilateral lower back pain, described as burning, radiating to the bilateral lower leg  On a scale of 1 to 10, the patient rates the pain as 8  Symptoms are worsening  Review of Systems    Constitutional: no fever,-no recent weight gain-and-no recent weight loss  Eyes: no double vision-and-no blurry vision  Cardiovascular: no chest pain,-no palpitations-and-no lower extremity edema  Respiratory: no complaints of shortness of breath-and-no wheezing  Musculoskeletal: joint stiffness-and-joint swelling right foot and ankle , but-no difficulty walking,-no muscle weakness,-no limb swelling,-no pain in extremity-and-no decreased range of motion  Neurological: no dizziness,-no difficulty swallowing,-no memory loss,-no loss of consciousness-and-no seizures  Gastrointestinal: constipation, but-no nausea,-no vomiting-and-no diarrhea  Genitourinary: no difficulty initiating urine stream,-no genital pain-and-no frequent urination  Integumentary: no complaints of skin rash  Psychiatric: no depression  Endocrine: no excessive thirst,-no adrenal disease,-no hypothyroidism-and-no hyperthyroidism  Hematologic/Lymphatic: no tendency for easy bruising-and-no tendency for easy bleeding  Active Problems  1  Analgesic use (V58 69) (Z79 899)   2  Anticoagulated by anticoagulation treatment (V58 61) (Z79 01)   3  Arthritis (716 90) (M19 90)   4  Atrial fibrillation (427 31) (I48 91)   5  Backache (724 5) (M54 9)   6  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)   7  Burning sensation (782 0) (R20 8)   8  Chronic bilateral low back pain (724 2,338 29) (M54 5,G89 29)   9  Chronic congestive heart failure, unspecified congestive heart failure type (428 0) (I50 9)   10  Chronic lumbar radiculopathy (724 4) (M54 16)   11  Coagulation defect (286 9) (D68 9)   12   Cough (786  2) (R05)   13  Current use of long term anticoagulation (V58 61) (Z79 01)   14  Disc degeneration, 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)   15  Encounter for long-term use of opiate analgesic (V58 69) (Z79 891)   16  Encounter for Medicare annual wellness exam (V70 0) (Z00 00)   17  Encounter for screening colonoscopy (V76 51) (Z12 11)   18  Encounter for screening mammogram for malignant neoplasm of breast (V76 12)    (Z12 31)   19  H/O mitral valve replacement with mechanical valve (V43 3) (Z95 2)   20  Hematuria (599 70) (R31 9)   21  Hemorrhoids (455 6) (K64 9)   22  Hyperlipidemia (272 4) (E78 5)   23  Hypertension (401 9) (I10)   24  Limb pain (729 5) (M79 609)   25  Limb Weakness (Paresis) (728 87)   26  Lumbar stenosis with neurogenic claudication (724 03) (M48 062)   27  History of Mitral Valve Replacement   28  Myofascial pain syndrome (729 1) (M79 1)   29  Need for prophylactic vaccination and inoculation against influenza (V04 81) (Z23)   30  Osteopenia (733 90) (M85 80)   31  Pain syndrome, chronic (338 4) (G89 4)   32  Sacroiliitis (720 2) (M46 1)   33  Senile purpura (287 2) (D69 2)   34  Sinusitis, acute (461 9) (J01 90)   35  Spinal stenosis (724 00) (M48 00)   36  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 myelopathy or radiculopathy (721 3)    (M47 817)   37  Tendonitis Of The Left Shoulder (726 10)   38  Type 2 diabetes mellitus (250 00) (E11 9)   39  Uncomplicated opioid dependence (304 00) (F11 20)   40  Ventral hernia without obstruction or gangrene (553 20) (K43 9)   41  Visit for screening mammogram (V76 12) (Z12 31)   42  Vitamin D deficiency (268 9) (E55 9)    Past Medical History  1  History of Arthritis (V13 4)   2  History of Benign Polyps Of The Large Intestine (V12 72)   3  Chronic bilateral low back pain (724 2,338 29) (M54 5,G89 29)   4  History of endocarditis (V12 59) (Z86 79)   5  History of hypertension (V12 59) (Z86 79)   6  History of Stroke Syndrome (436)   7   History of Stroke Syndrome (436)    Surgical History  1  History of Cataract Surgery   2  History of Gallbladder Surgery   3  History of Hemilaminect Decompress Part Facetectomy 1 Lumbar Interspace   4  History of Mitral Valve Replacement   5  History of Neuroplasty Decompression Median Nerve At Carpal Tunnel   6  History of Rotator Cuff Repair    Family History  Mother    1  Family history of Disorders Of Blood And Blood-forming Organs (V18 3)  Father    2  Family history of Acute Myocardial Infarction (V17 3)  Maternal Grandfather    3  Family history of Cancer  Paternal Grandfather    4  Family history of Heart Disease (V17 49)  Family History    5  Family history of Diabetes Mellitus (V18 0)   6  Family history of Family Health Status 2  Children Living   7  Family history of Family Health Status Siblings 5  Living   8  Family history of Maternal Grandfather Is    5  Family history of Maternal Grandmother Is    8  Family history of Paternal Grandfather Is    6  Family history of Paternal Grandmother Is     Social History   · Denied: History of Alcohol Use (History)   · Denied: History of Drug Use   · Marital History -    · Never a smoker   · No secondhand smoke exposure (V49 89) (Z78 9)   · Occupation: Retired   · Retired From Work    Current Meds   1  Atenolol 50 MG Oral Tablet; TAKE 1 TABLET EVERY DAY; Therapy: 62LIE1028 to (Evaluate:31Mld4615)  Requested for: 14Yyh2714; Last   Rx:32Ybw1814 Ordered   2  Gabapentin 300 MG Oral Capsule; Take 2 capsules three times daily; Therapy: 81FBL7826 to (Evaluate:87Fkx8878)  Requested for: 14Oux0793; Last   Rx:95Qqi7276 Ordered   3  Losartan Potassium-HCTZ 100-12 5 MG Oral Tablet; take 1 tablet every day; Therapy: 31MQO0787 to (Evaluate:94Fco2700)  Requested for: 73OQR4107; Last   Rx:68Cri2882 Ordered   4  MetFORMIN HCl  MG Oral Tablet Extended Release 24 Hour; take 1 tablet every   day;    Therapy: 99OVG0143 to (Evaluate:53Sjy2313) Requested for: 75LTP4155; Last   Rx:80Nsy8601 Ordered   5  Pravastatin Sodium 40 MG Oral Tablet; TAKE 1 TABLET EVERY DAY; Therapy: 09TEL5845 to (Evaluate:14Jun2018)  Requested for: 50AHB8951; Last   Rx:12Lcg9354 Ordered   6  TraMADol HCl - 50 MG Oral Tablet; Take 1 tablet 3 times daily as needed; Therapy: 50YOB2065 to (Evaluate:10Oct2017)  Requested for: 59Wdp5076; Last   Rx:21Brl7236 Ordered   7  Warfarin Sodium 5 MG Oral Tablet; take 1 tablet every day as directed; Therapy: 13DKV0108 to (Dossie Kingston)  Requested for: 11Uth9436; Last   Rx:91Mil5119 Ordered    Allergies  1  Lisinopril TABS    Vitals  Vital Signs    Recorded: 76SLT4597 09:41AM   Temperature 97 4 F   Heart Rate 64   Respiration 12   Systolic 899   Diastolic 56   Height 5 ft 4 in   Weight 167 lb    BMI Calculated 28 67   BSA Calculated 1 81   Pain Scale 8     Physical Exam    Constitutional   General appearance: Well developed, well nourished, alert, in no distress, non-toxic and no overt pain behavior  Eyes   Sclera: anicteric   HEENT   Hearing grossly intact  Pulmonary   Respiratory effort: Even and unlabored  Psychiatric   Mood and affect: Mood and affect appropriate  Neurologic   Cranial nerves: Cranial nerves II-XII grossly intact  Musculoskeletal   Gait and station: Abnormal  -using a cane to assist with ambulation  Lumbar/Sacral Spine examination demonstrates Lumbosacral Spine:   Tenderness: right paraspinal-and-the right sacroiliac joint  Flexion was restricted-and-was painful  Extension was restricted-and-was painful  Left lateral flexion was not restricted-and-was painless  Right lateral flexion was restricted-and-was painful  Rotation to the left was not restricted-and-was painless  Rotation to the right was not restricted-and-was painless  Foot and ankle strength was normal bilaterally  Knee strength was normal bilaterally  Hip strength was normal bilaterally     Evaluation of Muscle Stretch Reflexes on the right side demonstrates 2/4 Knee Jerk Reflex-and-2/4 Ankle Jerk Reflex  Evaluation of Muscle Stretch Reflexes on the left side demonstrates 2/4 Knee Jerk Reflex-and-2/4 Ankle Jerk Reflex  Special Tests: equivocal Slump test on right, but-negative Slump test on left        Future Appointments    Date/Time Provider Specialty Site   12/27/2017 09:45 AM Wilhemenia Runner, CRNP Pain Management [facility] SPINE   10/25/2017 12:30 PM Aryan Stephens, [facility]     Signatures   Electronically signed by : PHILIP Metzger; Oct  4 2017 10:14AM EST                       (Author)    Electronically signed by : Stephan Rodriguez DO; Oct  4 2017  4:14PM EST

## 2017-10-10 ENCOUNTER — TRANSCRIBE ORDERS (OUTPATIENT)
Dept: ADMINISTRATIVE | Facility: HOSPITAL | Age: 82
End: 2017-10-10

## 2017-10-10 DIAGNOSIS — Z12.39 SCREENING BREAST EXAMINATION: Primary | ICD-10-CM

## 2017-10-16 ENCOUNTER — APPOINTMENT (OUTPATIENT)
Dept: PHYSICAL THERAPY | Facility: REHABILITATION | Age: 82
End: 2017-10-16
Payer: MEDICARE

## 2017-10-16 DIAGNOSIS — M48.062 SPINAL STENOSIS OF LUMBAR REGION WITH NEUROGENIC CLAUDICATION: ICD-10-CM

## 2017-10-16 DIAGNOSIS — M54.50 LOW BACK PAIN: ICD-10-CM

## 2017-10-16 PROCEDURE — 97110 THERAPEUTIC EXERCISES: CPT

## 2017-10-16 PROCEDURE — 97162 PT EVAL MOD COMPLEX 30 MIN: CPT

## 2017-10-16 PROCEDURE — G8979 MOBILITY GOAL STATUS: HCPCS

## 2017-10-16 PROCEDURE — G8978 MOBILITY CURRENT STATUS: HCPCS

## 2017-10-17 ENCOUNTER — APPOINTMENT (OUTPATIENT)
Dept: LAB | Facility: CLINIC | Age: 82
End: 2017-10-17
Payer: MEDICARE

## 2017-10-17 ENCOUNTER — GENERIC CONVERSION - ENCOUNTER (OUTPATIENT)
Dept: OTHER | Facility: OTHER | Age: 82
End: 2017-10-17

## 2017-10-17 ENCOUNTER — LAB CONVERSION - ENCOUNTER (OUTPATIENT)
Dept: OTHER | Facility: OTHER | Age: 82
End: 2017-10-17

## 2017-10-17 DIAGNOSIS — E11.9 TYPE 2 DIABETES MELLITUS WITHOUT COMPLICATIONS (HCC): ICD-10-CM

## 2017-10-17 DIAGNOSIS — I48.91 ATRIAL FIBRILLATION, UNSPECIFIED TYPE (HCC): ICD-10-CM

## 2017-10-17 DIAGNOSIS — M46.1 SACROILIITIS, NOT ELSEWHERE CLASSIFIED (HCC): ICD-10-CM

## 2017-10-17 DIAGNOSIS — Z79.01 LONG TERM CURRENT USE OF ANTICOAGULANT THERAPY: ICD-10-CM

## 2017-10-17 DIAGNOSIS — I50.9 HEART FAILURE (HCC): ICD-10-CM

## 2017-10-17 DIAGNOSIS — I48.91 ATRIAL FIBRILLATION (HCC): ICD-10-CM

## 2017-10-17 DIAGNOSIS — E55.9 VITAMIN D DEFICIENCY: ICD-10-CM

## 2017-10-17 LAB
25(OH)D3 SERPL-MCNC: 34.1 NG/ML (ref 30–100)
ALBUMIN SERPL BCP-MCNC: 3.4 G/DL (ref 3.5–5)
ALP SERPL-CCNC: 51 U/L (ref 46–116)
ALT SERPL W P-5'-P-CCNC: 24 U/L (ref 12–78)
ANION GAP SERPL CALCULATED.3IONS-SCNC: 4 MMOL/L (ref 4–13)
AST SERPL W P-5'-P-CCNC: 17 U/L (ref 5–45)
BASOPHILS # BLD AUTO: 0.03 THOUSANDS/ΜL (ref 0–0.1)
BASOPHILS NFR BLD AUTO: 0 % (ref 0–1)
BILIRUB SERPL-MCNC: 0.79 MG/DL (ref 0.2–1)
BUN SERPL-MCNC: 23 MG/DL (ref 5–25)
CALCIUM SERPL-MCNC: 8.9 MG/DL (ref 8.3–10.1)
CHLORIDE SERPL-SCNC: 103 MMOL/L (ref 100–108)
CHOLEST SERPL-MCNC: 137 MG/DL (ref 50–200)
CO2 SERPL-SCNC: 33 MMOL/L (ref 21–32)
CREAT SERPL-MCNC: 0.75 MG/DL (ref 0.6–1.3)
EOSINOPHIL # BLD AUTO: 0.08 THOUSAND/ΜL (ref 0–0.61)
EOSINOPHIL NFR BLD AUTO: 1 % (ref 0–6)
ERYTHROCYTE [DISTWIDTH] IN BLOOD BY AUTOMATED COUNT: 13.6 % (ref 11.6–15.1)
EST. AVERAGE GLUCOSE BLD GHB EST-MCNC: 111 MG/DL
GFR SERPL CREATININE-BSD FRML MDRD: 74 ML/MIN/1.73SQ M
GLUCOSE P FAST SERPL-MCNC: 111 MG/DL (ref 65–99)
HBA1C MFR BLD: 5.5 % (ref 4.2–6.3)
HCT VFR BLD AUTO: 40.2 % (ref 34.8–46.1)
HDLC SERPL-MCNC: 52 MG/DL (ref 40–60)
HGB BLD-MCNC: 13.2 G/DL (ref 11.5–15.4)
INR PPP: 2.67 (ref 0.86–1.16)
LDLC SERPL CALC-MCNC: 66 MG/DL (ref 0–100)
LYMPHOCYTES # BLD AUTO: 1.65 THOUSANDS/ΜL (ref 0.6–4.47)
LYMPHOCYTES NFR BLD AUTO: 21 % (ref 14–44)
MCH RBC QN AUTO: 29.9 PG (ref 26.8–34.3)
MCHC RBC AUTO-ENTMCNC: 32.8 G/DL (ref 31.4–37.4)
MCV RBC AUTO: 91 FL (ref 82–98)
MONOCYTES # BLD AUTO: 0.66 THOUSAND/ΜL (ref 0.17–1.22)
MONOCYTES NFR BLD AUTO: 9 % (ref 4–12)
NEUTROPHILS # BLD AUTO: 5.3 THOUSANDS/ΜL (ref 1.85–7.62)
NEUTS SEG NFR BLD AUTO: 69 % (ref 43–75)
NRBC BLD AUTO-RTO: 0 /100 WBCS
PLATELET # BLD AUTO: 276 THOUSANDS/UL (ref 149–390)
PMV BLD AUTO: 10.9 FL (ref 8.9–12.7)
POTASSIUM SERPL-SCNC: 3.8 MMOL/L (ref 3.5–5.3)
PROT SERPL-MCNC: 6.9 G/DL (ref 6.4–8.2)
PROTHROMBIN TIME: 28.8 SECONDS (ref 12.1–14.4)
RBC # BLD AUTO: 4.41 MILLION/UL (ref 3.81–5.12)
SODIUM SERPL-SCNC: 140 MMOL/L (ref 136–145)
TRIGL SERPL-MCNC: 95 MG/DL
TSH SERPL DL<=0.05 MIU/L-ACNC: 1 UIU/ML (ref 0.36–3.74)
WBC # BLD AUTO: 7.75 THOUSAND/UL (ref 4.31–10.16)

## 2017-10-17 PROCEDURE — 36415 COLL VENOUS BLD VENIPUNCTURE: CPT

## 2017-10-17 PROCEDURE — 85610 PROTHROMBIN TIME: CPT

## 2017-10-17 PROCEDURE — 83036 HEMOGLOBIN GLYCOSYLATED A1C: CPT

## 2017-10-17 PROCEDURE — 85025 COMPLETE CBC W/AUTO DIFF WBC: CPT

## 2017-10-17 PROCEDURE — 82306 VITAMIN D 25 HYDROXY: CPT

## 2017-10-17 PROCEDURE — 80061 LIPID PANEL: CPT

## 2017-10-17 PROCEDURE — 80053 COMPREHEN METABOLIC PANEL: CPT

## 2017-10-17 PROCEDURE — 84443 ASSAY THYROID STIM HORMONE: CPT

## 2017-10-19 DIAGNOSIS — M46.1 SACROILIITIS, NOT ELSEWHERE CLASSIFIED (HCC): ICD-10-CM

## 2017-10-19 DIAGNOSIS — Z79.01 LONG TERM CURRENT USE OF ANTICOAGULANT: ICD-10-CM

## 2017-10-19 DIAGNOSIS — E55.9 VITAMIN D DEFICIENCY: ICD-10-CM

## 2017-10-19 DIAGNOSIS — E11.9 TYPE 2 DIABETES MELLITUS WITHOUT COMPLICATIONS (HCC): ICD-10-CM

## 2017-10-19 DIAGNOSIS — I48.91 ATRIAL FIBRILLATION (HCC): ICD-10-CM

## 2017-10-19 DIAGNOSIS — Z00.00 ENCOUNTER FOR GENERAL ADULT MEDICAL EXAMINATION WITHOUT ABNORMAL FINDINGS: ICD-10-CM

## 2017-10-19 DIAGNOSIS — I50.9 HEART FAILURE (HCC): ICD-10-CM

## 2017-10-20 ENCOUNTER — APPOINTMENT (OUTPATIENT)
Dept: PHYSICAL THERAPY | Facility: REHABILITATION | Age: 82
End: 2017-10-20
Payer: MEDICARE

## 2017-10-20 PROCEDURE — 97110 THERAPEUTIC EXERCISES: CPT

## 2017-10-23 ENCOUNTER — APPOINTMENT (OUTPATIENT)
Dept: PHYSICAL THERAPY | Facility: REHABILITATION | Age: 82
End: 2017-10-23
Payer: MEDICARE

## 2017-10-25 ENCOUNTER — ALLSCRIPTS OFFICE VISIT (OUTPATIENT)
Dept: OTHER | Facility: OTHER | Age: 82
End: 2017-10-25

## 2017-10-26 ENCOUNTER — APPOINTMENT (OUTPATIENT)
Dept: PHYSICAL THERAPY | Facility: REHABILITATION | Age: 82
End: 2017-10-26
Payer: MEDICARE

## 2017-10-26 NOTE — PROGRESS NOTES
Assessment  1  Atrial fibrillation (427 31) (I48 91)   2  Chronic congestive heart failure, unspecified congestive heart failure type (428 0) (I50 9)   3  Hypertension (401 9) (I10)   4  Hyperlipidemia (272 4) (E78 5)   5  Sacroiliitis (720 2) (M46 1)   6  Type 2 diabetes mellitus (250 00) (E11 9)    Plan  Atrial fibrillation, Chronic congestive heart failure, unspecified congestive heart failure  type, Hyperlipidemia, Hypertension, Sacroiliitis, Type 2 diabetes mellitus    · (1) CBC/PLT/DIFF; Status:Active; Requested for:53Ibz7907;    · (1) COMPREHENSIVE METABOLIC PANEL; Status:Active; Requested for:60Ays3691;    · (1) HEMOGLOBIN A1C; Status:Active; Requested for:08Iqu9234;    · (1) LIPID PANEL FASTING W DIRECT LDL REFLEX; Status:Active; Requested  for:41Yln9791;    · (1) TSH WITH FT4 REFLEX; Status:Active; Requested for:56Jxp8517; Health Maintenance    · * MAMMO SCREENING BILATERAL W CAD; Status:Hold For - Scheduling; Requested  SEC:48RFB7231;   Need for influenza vaccination    · Fluzone High-Dose 0 5 ML Intramuscular Suspension Prefilled Syringe    Discussion/Summary    1  Hypertension-presently stable with atenolol, losartan, and hydrochlorothiazide  Hyperlipidemia-stable with pravastatin 40 mg daily  Cholesterol numbers were reviewed in detail with the patient  Atrial fibrillation-stable with atenolol and warfarin therapy  Type 2 diabetes-presently stable on metformin therapy  Hemoglobin A1c is 5 5  Will reassess in 4 months and possibly consider discontinuing metformin  Sacroiliitis-stable per pain management and physical therapy with tramadol and gabapentin maintenance-flu vaccine given today  Pneumococcal vaccinations are up-to-date  in 4 months with labs        Chief Complaint  4 month follow up for chronic conditions and to review blood work  Ayaan Hagan      History of Present Illness  This is an 55-year-old female who presents to the office for follow-up to chronic health conditions including hypertension, increased lipids, atrial fibrillation, and chronic sacroiliitis  Her atrial fibrillation has been maintained with atenolol and warfarin therapy  Her cholesterol has been stable on pravastatin 40 mg daily  Her blood pressure has also been stabilized with losartan and hydrochlorothiazide  And she does see Pain Management on a regular basis for sacroiliitis and is starting some physical therapy for the low back as well as continuing with tramadol and gabapentin as necessary  Review of Systems    Constitutional: no fever,-- not feeling poorly,-- no chills-- and-- not feeling tired  Eyes: no eyesight problems-- and-- no purulent discharge from the eyes  ENT: no nosebleeds-- and-- no nasal discharge  Cardiovascular: no chest pain,-- the heart rate was not fast-- and-- no palpitations  Respiratory: no shortness of breath,-- no cough-- and-- no shortness of breath during exertion  Gastrointestinal: no nausea-- and-- no diarrhea  Musculoskeletal: arthralgias,-- myalgias-- and-- joint stiffness, but-- as noted in HPI  Neurological: no headache  Hematologic/Lymphatic: no swollen glands  Active Problems  1  Analgesic use (V58 69) (Z79 899)   2  Anticoagulated by anticoagulation treatment (V58 61) (Z79 01)   3  Arthritis (716 90) (M19 90)   4  Atrial fibrillation (427 31) (I48 91)   5  Backache (724 5) (M54 9)   6  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)   7  Burning sensation (782 0) (R20 8)   8  Chronic bilateral low back pain (724 2,338 29) (M54 5,G89 29)   9  Chronic congestive heart failure, unspecified congestive heart failure type (428 0) (I50 9)   10  Chronic lumbar radiculopathy (724 4) (M54 16)   11  Coagulation defect (286 9) (D68 9)   12  Cough (786 2) (R05)   13  Current use of long term anticoagulation (V58 61) (Z79 01)   14  Disc degeneration, 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)   15  Encounter for long-term use of opiate analgesic (V58 69) (Z79 891)   16   Encounter for Medicare annual wellness exam (V70 0) (Z00 00)   17  Encounter for screening colonoscopy (V76 51) (Z12 11)   18  Encounter for screening mammogram for malignant neoplasm of breast (V76 12)    (Z12 31)   19  H/O mitral valve replacement with mechanical valve (V43 3) (Z95 2)   20  Hematuria (599 70) (R31 9)   21  Hemorrhoids (455 6) (K64 9)   22  Hyperlipidemia (272 4) (E78 5)   23  Hypertension (401 9) (I10)   24  Limb pain (729 5) (M79 609)   25  Limb Weakness (Paresis) (728 87)   26  Lumbar stenosis with neurogenic claudication (724 03) (M48 062)   27  History of Mitral Valve Replacement   28  Myofascial pain syndrome (729 1) (M79 1)   29  Need for prophylactic vaccination and inoculation against influenza (V04 81) (Z23)   30  Osteopenia (733 90) (M85 80)   31  Pain syndrome, chronic (338 4) (G89 4)   32  Sacroiliitis (720 2) (M46 1)   33  Senile purpura (287 2) (D69 2)   34  Sinusitis, acute (461 9) (J01 90)   35  Spinal stenosis (724 00) (M48 00)   36  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 myelopathy or radiculopathy (721 3)    (M47 817)   37  Tendonitis Of The Left Shoulder (726 10)   38  Type 2 diabetes mellitus (250 00) (E11 9)   39  Uncomplicated opioid dependence (304 00) (F11 20)   40  Ventral hernia without obstruction or gangrene (553 20) (K43 9)   41  Visit for screening mammogram (V76 12) (Z12 31)   42  Vitamin D deficiency (268 9) (E55 9)    Past Medical History  1  History of Arthritis (V13 4)   2  History of Benign Polyps Of The Large Intestine (V12 72)   3  Chronic bilateral low back pain (724 2,338 29) (M54 5,G89 29)   4  History of endocarditis (V12 59) (Z86 79)   5  History of hypertension (V12 59) (Z86 79)   6  History of Stroke Syndrome (436)   7  History of Stroke Syndrome (436)    The active problems and past medical history were reviewed and updated today  Surgical History  1  History of Cataract Surgery   2  History of Gallbladder Surgery   3   History of Hemilaminect Decompress Part Facetectomy 1 Lumbar Interspace   4  History of Mitral Valve Replacement   5  History of Neuroplasty Decompression Median Nerve At Carpal Tunnel   6  History of Rotator Cuff Repair    Family History  Mother    1  Family history of Disorders Of Blood And Blood-forming Organs (V18 3)  Father    2  Family history of Acute Myocardial Infarction (V17 3)  Maternal Grandfather    3  Family history of Cancer  Paternal Grandfather    4  Family history of Heart Disease (V17 49)  Family History    5  Family history of Diabetes Mellitus (V18 0)   6  Family history of Family Health Status 2  Children Living   7  Family history of Family Health Status Siblings 5  Living   8  Family history of Maternal Grandfather Is    5  Family history of Maternal Grandmother Is    8  Family history of Paternal Grandfather Is    6  Family history of Paternal Grandmother Is     Social History   · Denied: History of Alcohol Use (History)   · Denied: History of Drug Use   · Marital History -    · Never a smoker   · No secondhand smoke exposure (V49 89) (Z78 9)   · Occupation: Retired   · Retired From Work    Current Meds   1  Atenolol 50 MG Oral Tablet; TAKE 1 TABLET EVERY DAY; Therapy: 69IQQ0546 to (Evaluate:44Tus1319)  Requested for: 92Bsg4670; Last   Rx:68Kol4146 Ordered   2  Gabapentin 300 MG Oral Capsule; Take 2 capsules three times daily; Therapy: 67UCQ0179 to (Evaluate:2017)  Requested for: 97Hwq7516; Last   Rx:2017 Ordered   3  Losartan Potassium-HCTZ 100-12 5 MG Oral Tablet; take 1 tablet every day; Therapy: 57HDA4684 to (Evaluate:09Nkw8259)  Requested for: 47ZDM6868; Last   Rx:75Xfh8498 Ordered   4  MetFORMIN HCl  MG Oral Tablet Extended Release 24 Hour; take 1 tablet every   day; Therapy: 71MLW3282 to (Evaluate:04Aup1178)  Requested for: 37UZJ2468; Last   Rx:52Mry0984 Ordered   5  Pravastatin Sodium 40 MG Oral Tablet; TAKE 1 TABLET EVERY DAY;    Therapy: 74MZV4589 to (Evaluate:2018) Requested for: 47MFD6712; Last   Rx:97Brt7568 Ordered   6  TraMADol HCl - 50 MG Oral Tablet; Take 1 tablet 3 times daily as needed; Therapy: 14GUN6946 to (Evaluate:10Oct2017)  Requested for: 59Jpv8664; Last   Rx:27Phq3443 Ordered   7  Warfarin Sodium 5 MG Oral Tablet; take 1 tablet every day as directed; Therapy: 47FJE4805 to (Antoni Ling)  Requested for: 02Osb2860; Last   Rx:71Uht7699 Ordered    The medication list was reviewed and updated today  Allergies  1  Lisinopril TABS    Vitals  Vital Signs    Recorded: 97SDC1694 12:11PM   Heart Rate 72   Systolic 066   Diastolic 70   Height 5 ft 4 in   Weight 164 lb 4 oz   BMI Calculated 28 19   BSA Calculated 1 8     Physical Exam    Constitutional   General appearance: No acute distress, well appearing and well nourished  Eyes   Conjunctiva and lids: No swelling, erythema or discharge  Pupils and irises: Equal, round and reactive to light  Ears, Nose, Mouth, and Throat   External inspection of ears and nose: Normal     Otoscopic examination: Tympanic membranes translucent with normal light reflex  Canals patent without erythema  Nasal mucosa, septum, and turbinates: Normal without edema or erythema  Oropharynx: Normal with no erythema, edema, exudate or lesions  Pulmonary   Respiratory effort: No increased work of breathing or signs of respiratory distress  Auscultation of lungs: Clear to auscultation  Cardiovascular   Auscultation of heart: Normal rate and rhythm, normal S1 and S2, without murmurs  Examination of extremities for edema and/or varicosities: Normal     Musculoskeletal   Gait and station: Abnormal  -- Ambulates with a cane  Digits and nails: Normal without clubbing or cyanosis  Inspection/palpation of joints, bones, and muscles: Normal     Skin   Skin and subcutaneous tissue: Normal without rashes or lesions      Psychiatric   Orientation to person, place, and time: Normal     Mood and affect: Normal  Results/Data  PHQ-2 Adult Depression Screening 25Oct2017 12:21PM User, Ahs     Test Name Result Flag Reference   PHQ-2 Adult Depression Score 0     Over the last two weeks, how often have you been bothered by any of the following problems?   Little interest or pleasure in doing things: Not at all - 0  Feeling down, depressed, or hopeless: Not at all - 0   PHQ-2 Adult Depression Screening Negative         Future Appointments    Date/Time Provider Specialty Site   12/27/2017 09:45 AM Wilhemenia Runner, CRNP Pain Management [address]     Signatures   Electronically signed by : Joao Mckeon [facility]; Oct 25 2017 12:42PM EST                       (Author)    Electronically signed by : VANDANA Lemos ; Oct 25 2017 12:49PM EST

## 2017-10-30 ENCOUNTER — APPOINTMENT (OUTPATIENT)
Dept: PHYSICAL THERAPY | Facility: REHABILITATION | Age: 82
End: 2017-10-30
Payer: MEDICARE

## 2017-10-30 PROCEDURE — 97110 THERAPEUTIC EXERCISES: CPT

## 2017-10-30 PROCEDURE — 97530 THERAPEUTIC ACTIVITIES: CPT

## 2017-11-01 ENCOUNTER — APPOINTMENT (OUTPATIENT)
Dept: PHYSICAL THERAPY | Facility: REHABILITATION | Age: 82
End: 2017-11-01
Payer: MEDICARE

## 2017-11-01 PROCEDURE — 97530 THERAPEUTIC ACTIVITIES: CPT

## 2017-11-01 PROCEDURE — 97110 THERAPEUTIC EXERCISES: CPT

## 2017-11-01 PROCEDURE — 97150 GROUP THERAPEUTIC PROCEDURES: CPT

## 2017-11-06 ENCOUNTER — APPOINTMENT (OUTPATIENT)
Dept: PHYSICAL THERAPY | Facility: REHABILITATION | Age: 82
End: 2017-11-06
Payer: MEDICARE

## 2017-11-06 PROCEDURE — 97530 THERAPEUTIC ACTIVITIES: CPT

## 2017-11-06 PROCEDURE — 97110 THERAPEUTIC EXERCISES: CPT

## 2017-11-07 ENCOUNTER — APPOINTMENT (OUTPATIENT)
Dept: LAB | Facility: CLINIC | Age: 82
End: 2017-11-07
Payer: MEDICARE

## 2017-11-07 ENCOUNTER — GENERIC CONVERSION - ENCOUNTER (OUTPATIENT)
Dept: OTHER | Facility: OTHER | Age: 82
End: 2017-11-07

## 2017-11-07 ENCOUNTER — TRANSCRIBE ORDERS (OUTPATIENT)
Dept: LAB | Facility: CLINIC | Age: 82
End: 2017-11-07

## 2017-11-07 DIAGNOSIS — I48.91 ATRIAL FIBRILLATION, UNSPECIFIED TYPE (HCC): ICD-10-CM

## 2017-11-07 DIAGNOSIS — Z79.01 LONG TERM CURRENT USE OF ANTICOAGULANT THERAPY: ICD-10-CM

## 2017-11-07 LAB
INR PPP: 2.33 (ref 0.86–1.16)
PROTHROMBIN TIME: 25.8 SECONDS (ref 12.1–14.4)

## 2017-11-07 PROCEDURE — 85610 PROTHROMBIN TIME: CPT

## 2017-11-07 PROCEDURE — 36415 COLL VENOUS BLD VENIPUNCTURE: CPT

## 2017-11-08 ENCOUNTER — APPOINTMENT (OUTPATIENT)
Dept: PHYSICAL THERAPY | Facility: REHABILITATION | Age: 82
End: 2017-11-08
Payer: MEDICARE

## 2017-11-08 PROCEDURE — 97110 THERAPEUTIC EXERCISES: CPT

## 2017-11-08 PROCEDURE — 97530 THERAPEUTIC ACTIVITIES: CPT

## 2017-11-13 ENCOUNTER — APPOINTMENT (OUTPATIENT)
Dept: PHYSICAL THERAPY | Facility: REHABILITATION | Age: 82
End: 2017-11-13
Payer: MEDICARE

## 2017-11-13 PROCEDURE — G8978 MOBILITY CURRENT STATUS: HCPCS

## 2017-11-13 PROCEDURE — 97110 THERAPEUTIC EXERCISES: CPT

## 2017-11-13 PROCEDURE — G8979 MOBILITY GOAL STATUS: HCPCS

## 2017-11-13 PROCEDURE — 97140 MANUAL THERAPY 1/> REGIONS: CPT

## 2017-11-14 ENCOUNTER — LAB CONVERSION - ENCOUNTER (OUTPATIENT)
Dept: OTHER | Facility: OTHER | Age: 82
End: 2017-11-14

## 2017-11-15 ENCOUNTER — APPOINTMENT (OUTPATIENT)
Dept: PHYSICAL THERAPY | Facility: REHABILITATION | Age: 82
End: 2017-11-15
Payer: MEDICARE

## 2017-11-15 PROCEDURE — 97530 THERAPEUTIC ACTIVITIES: CPT

## 2017-11-15 PROCEDURE — 97110 THERAPEUTIC EXERCISES: CPT

## 2017-11-20 ENCOUNTER — APPOINTMENT (OUTPATIENT)
Dept: PHYSICAL THERAPY | Facility: REHABILITATION | Age: 82
End: 2017-11-20
Payer: MEDICARE

## 2017-11-20 PROCEDURE — 97110 THERAPEUTIC EXERCISES: CPT

## 2017-11-21 ENCOUNTER — GENERIC CONVERSION - ENCOUNTER (OUTPATIENT)
Dept: OTHER | Facility: OTHER | Age: 82
End: 2017-11-21

## 2017-11-21 ENCOUNTER — APPOINTMENT (OUTPATIENT)
Dept: LAB | Facility: CLINIC | Age: 82
End: 2017-11-21
Payer: MEDICARE

## 2017-11-21 DIAGNOSIS — Z79.01 LONG TERM CURRENT USE OF ANTICOAGULANT THERAPY: ICD-10-CM

## 2017-11-21 DIAGNOSIS — I48.91 ATRIAL FIBRILLATION, UNSPECIFIED TYPE (HCC): ICD-10-CM

## 2017-11-21 LAB
INR PPP: 2.66 (ref 0.86–1.16)
PROTHROMBIN TIME: 28.7 SECONDS (ref 12.1–14.4)

## 2017-11-21 PROCEDURE — 36415 COLL VENOUS BLD VENIPUNCTURE: CPT

## 2017-11-21 PROCEDURE — 85610 PROTHROMBIN TIME: CPT

## 2017-11-27 ENCOUNTER — APPOINTMENT (OUTPATIENT)
Dept: PHYSICAL THERAPY | Facility: REHABILITATION | Age: 82
End: 2017-11-27
Payer: MEDICARE

## 2017-11-27 PROCEDURE — 97110 THERAPEUTIC EXERCISES: CPT

## 2017-11-29 ENCOUNTER — APPOINTMENT (OUTPATIENT)
Dept: PHYSICAL THERAPY | Facility: REHABILITATION | Age: 82
End: 2017-11-29
Payer: MEDICARE

## 2017-11-29 PROCEDURE — 97112 NEUROMUSCULAR REEDUCATION: CPT

## 2017-11-29 PROCEDURE — 97110 THERAPEUTIC EXERCISES: CPT

## 2017-12-04 ENCOUNTER — APPOINTMENT (OUTPATIENT)
Dept: PHYSICAL THERAPY | Facility: REHABILITATION | Age: 82
End: 2017-12-04
Payer: MEDICARE

## 2017-12-04 PROCEDURE — 97110 THERAPEUTIC EXERCISES: CPT

## 2017-12-06 ENCOUNTER — APPOINTMENT (OUTPATIENT)
Dept: PHYSICAL THERAPY | Facility: REHABILITATION | Age: 82
End: 2017-12-06
Payer: MEDICARE

## 2017-12-06 PROCEDURE — 97112 NEUROMUSCULAR REEDUCATION: CPT

## 2017-12-06 PROCEDURE — 97110 THERAPEUTIC EXERCISES: CPT

## 2017-12-11 ENCOUNTER — GENERIC CONVERSION - ENCOUNTER (OUTPATIENT)
Dept: PAIN MEDICINE | Facility: MEDICAL CENTER | Age: 82
End: 2017-12-11

## 2017-12-11 ENCOUNTER — APPOINTMENT (OUTPATIENT)
Dept: LAB | Facility: CLINIC | Age: 82
End: 2017-12-11
Payer: MEDICARE

## 2017-12-11 ENCOUNTER — TRANSCRIBE ORDERS (OUTPATIENT)
Dept: LAB | Facility: CLINIC | Age: 82
End: 2017-12-11

## 2017-12-11 ENCOUNTER — APPOINTMENT (OUTPATIENT)
Dept: PHYSICAL THERAPY | Facility: REHABILITATION | Age: 82
End: 2017-12-11
Payer: MEDICARE

## 2017-12-11 DIAGNOSIS — Z79.1 ENCOUNTER FOR LONG-TERM (CURRENT) USE OF NON-STEROIDAL ANTI-INFLAMMATORIES: Primary | ICD-10-CM

## 2017-12-11 DIAGNOSIS — I48.0 PAROXYSMAL ATRIAL FIBRILLATION (HCC): ICD-10-CM

## 2017-12-11 LAB
INR PPP: 2.88 (ref 0.86–1.16)
PROTHROMBIN TIME: 30.6 SECONDS (ref 12.1–14.4)

## 2017-12-11 PROCEDURE — 97110 THERAPEUTIC EXERCISES: CPT

## 2017-12-11 PROCEDURE — 97140 MANUAL THERAPY 1/> REGIONS: CPT

## 2017-12-11 PROCEDURE — 36415 COLL VENOUS BLD VENIPUNCTURE: CPT

## 2017-12-11 PROCEDURE — G8978 MOBILITY CURRENT STATUS: HCPCS

## 2017-12-11 PROCEDURE — G8979 MOBILITY GOAL STATUS: HCPCS

## 2017-12-11 PROCEDURE — 85610 PROTHROMBIN TIME: CPT

## 2017-12-12 ENCOUNTER — GENERIC CONVERSION - ENCOUNTER (OUTPATIENT)
Dept: OTHER | Facility: OTHER | Age: 82
End: 2017-12-12

## 2017-12-13 ENCOUNTER — APPOINTMENT (OUTPATIENT)
Dept: PHYSICAL THERAPY | Facility: REHABILITATION | Age: 82
End: 2017-12-13
Payer: MEDICARE

## 2017-12-13 PROCEDURE — 97110 THERAPEUTIC EXERCISES: CPT

## 2017-12-13 PROCEDURE — 97112 NEUROMUSCULAR REEDUCATION: CPT

## 2017-12-18 ENCOUNTER — APPOINTMENT (OUTPATIENT)
Dept: PHYSICAL THERAPY | Facility: REHABILITATION | Age: 82
End: 2017-12-18
Payer: MEDICARE

## 2017-12-18 PROCEDURE — 97110 THERAPEUTIC EXERCISES: CPT

## 2017-12-20 ENCOUNTER — APPOINTMENT (OUTPATIENT)
Dept: PHYSICAL THERAPY | Facility: REHABILITATION | Age: 82
End: 2017-12-20
Payer: MEDICARE

## 2017-12-20 PROCEDURE — 97112 NEUROMUSCULAR REEDUCATION: CPT

## 2017-12-20 PROCEDURE — 97110 THERAPEUTIC EXERCISES: CPT

## 2017-12-27 ENCOUNTER — APPOINTMENT (OUTPATIENT)
Dept: PHYSICAL THERAPY | Facility: REHABILITATION | Age: 82
End: 2017-12-27
Payer: MEDICARE

## 2017-12-27 ENCOUNTER — ALLSCRIPTS OFFICE VISIT (OUTPATIENT)
Dept: OTHER | Facility: OTHER | Age: 82
End: 2017-12-27

## 2017-12-27 PROCEDURE — 97110 THERAPEUTIC EXERCISES: CPT

## 2017-12-27 PROCEDURE — 97112 NEUROMUSCULAR REEDUCATION: CPT

## 2017-12-28 NOTE — PROGRESS NOTES
Assessment   1  Pain syndrome, chronic (338 4) (G89 4)   2  Sacroiliitis (720 2) (M46 1)   3  Chronic bilateral low back pain (724 2,338 29) (M54 5,G89 29)   4  Chronic lumbar radiculopathy (724 4) (M54 16)   5  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 myelopathy or radiculopathy (721 3)     (M47 817)    Plan   Chronic bilateral low back pain    · From  TraMADol HCl - 50 MG Oral Tablet Take 1 tablet 3 times daily as needed    To TraMADol HCl - 50 MG Oral Tablet TAKE 1 TABLET DAILY AS NEEDED FOR BACK    PAIN   Rx By: Cong Dalton; Dispense: 30 Days ; #:30 Tablet; Refill: 1;For: Chronic bilateral low back pain; ROBBIN = N; Print Rx  Lumbar stenosis with neurogenic claudication    · Gabapentin 300 MG Oral Capsule; Take 2 capsules three times daily   Rx By: Cong Barneser; Dispense: 60 Days ; #:360 Capsule; Refill: 2;For: Lumbar stenosis with neurogenic claudication; ROBBIN = N; Sent To: [facility]  Pain syndrome, chronic    · Follow-up visit in 3 months Evaluation and Treatment  Follow-up with [facility] for med refills     Status: Hold For - Scheduling  Requested for: 16Aiz1497   Ordered; For: Pain syndrome, chronic; Ordered By: Cong Dalton Performed:  Due: 85OMQ5412    Discussion/Summary      The patient presents today for a follow-up office visit  The patient is currently being treated for her sacroiliitis, low back pain, lumbar radiculopathy, and spondylosis of lumbosacral region without myelopathy or radiculopathy   The patient has been taking gabapentin 300 mg 2 tablets 3 times a day, along with tramadol 50 mg she only takes this on an as-needed basis  She reports an overall 50% relief of her pain symptoms at the current medications and no side effects or issues   She has been participating in physical therapy 2 times per week which she thinks is very helpful    this time, I will refill the patient's tramadol, I will decrease the amount of tablet she gets per month as she tells me she has never taken 3 tablets a day  She only takes 1 tablet daily and on a very occasional basis if she is going away for the day  We decreased her tablets of 30 tablets with 1 refill    with gabapentin as prescribed, this was also refilled  with physical therapy program   PDMP was reviewed today and was appropriate   will see the patient back in 3 months for medication refills  Patient is able to Self-Care  The treatment plan was reviewed with the patient/guardian  The patient/guardian understands and agrees with the treatment plan         There are risks associated with opiod medications, including dependence, addiction and tolerance  The patient understands and agrees to use these medications only as prescribed  Potential side effects of the medications include, but are not limited to, constipation, drowsiness, addiction, impaired judgment and risk of fatal overdose if not taken as prescribed  Sharing medications is a felony  At this point and time, the patient is showing no signs of addiction, abuse, diversion or suicidal ideation  Chief Complaint   1  Pain  Last visit 10/04/2017 unchanged since last visit    back pain      History of Present Illness   The patient presents today for a follow-up office visit  The patient is currently being treated for her sacroiliitis, low back pain, lumbar radiculopathy, and spondylosis of lumbosacral region without myelopathy or radiculopathy   The patient has been taking gabapentin 300 mg 2 tablets 3 times a day, along with tramadol 50 mg she only takes this on an as-needed basis  She reports an overall 50% relief of her pain symptoms at the current medications and no side effects or issues  She has been participating in physical therapy 2 times per week which she thinks is very helpful  the patient rates her pain 5/10, this is constant in nature most bothersome in the morning  She describes her pain as dull, aching, and throbbing   She localizes her pain across her low back    have personally reviewed and/or updated the patient's past medical history, past surgical history, family history, social history, current medications, allergies, and vital signs today  Terressa Doctor presents with complaints of constant episodes of moderate bilateral lower back pain, described as dull, aching and throbbing  On a scale of 1 to 10, the patient rates the pain as 5  Symptoms are unchanged  Review of Systems        Constitutional: no fever,-- no recent weight gain-- and-- no recent weight loss  Eyes: no double vision-- and-- no blurry vision  Cardiovascular: no chest pain,-- no palpitations-- and-- no lower extremity edema  Respiratory: no complaints of shortness of breath-- and-- no wheezing  Musculoskeletal: joint stiffness, but-- no difficulty walking,-- no muscle weakness,-- no joint swelling,-- no limb swelling,-- no pain in extremity-- and-- no decreased range of motion  Neurological: no dizziness,-- no difficulty swallowing,-- no memory loss,-- no loss of consciousness-- and-- no seizures  Gastrointestinal: constipation, but-- no nausea,-- no vomiting-- and-- no diarrhea  Genitourinary: no difficulty initiating urine stream,-- no genital pain-- and-- no frequent urination  Integumentary: no complaints of skin rash  Psychiatric: no depression  Endocrine: no excessive thirst,-- no adrenal disease,-- no hypothyroidism-- and-- no hyperthyroidism  Hematologic/Lymphatic: no tendency for easy bruising-- and-- no tendency for easy bleeding  Active Problems   1  Analgesic use (V58 69) (Z79 899)   2  Anticoagulated by anticoagulation treatment (V58 61) (Z79 01)   3  Arthritis (716 90) (M19 90)   4  Atrial fibrillation (427 31) (I48 91)   5  Backache (724 5) (M54 9)   6  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)   7  Burning sensation (782 0) (R20 8)   8  Chronic bilateral low back pain (724 2,338 29) (M54 5,G89 29)   9   Chronic congestive heart failure, unspecified congestive heart failure type (428 0) (I50 9)   10  Chronic lumbar radiculopathy (724 4) (M54 16)   11  Coagulation defect (286 9) (D68 9)   12  Cough (786 2) (R05)   13  Current use of long term anticoagulation (V58 61) (Z79 01)   14  Disc degeneration, 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)   15  Encounter for long-term use of opiate analgesic (V58 69) (Z79 891)   16  Encounter for Medicare annual wellness exam (V70 0) (Z00 00)   17  Encounter for screening colonoscopy (V76 51) (Z12 11)   18  Encounter for screening mammogram for malignant neoplasm of breast (V76 12)      (Z12 31)   19  H/O mitral valve replacement with mechanical valve (V43 3) (Z95 2)   20  Hematuria (599 70) (R31 9)   21  Hemorrhoids (455 6) (K64 9)   22  Hyperlipidemia (272 4) (E78 5)   23  Hypertension (401 9) (I10)   24  Limb pain (729 5) (M79 609)   25  Limb Weakness (Paresis) (728 87)   26  Lumbar stenosis with neurogenic claudication (724 03) (M48 062)   27  History of Mitral Valve Replacement   28  Myofascial pain syndrome (729 1) (M79 1)   29  Need for influenza vaccination (V04 81) (Z23)   30  Need for prophylactic vaccination and inoculation against influenza (V04 81) (Z23)   31  Osteopenia (733 90) (M85 80)   32  Pain syndrome, chronic (338 4) (G89 4)   33  Sacroiliitis (720 2) (M46 1)   34  Senile purpura (287 2) (D69 2)   35  Sinusitis, acute (461 9) (J01 90)   36  Spinal stenosis (724 00) (M48 00)   37  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 myelopathy or radiculopathy (721 3)      (M47 817)   38  Tendonitis Of The Left Shoulder (726 10)   39  Type 2 diabetes mellitus (250 00) (E11 9)   40  Uncomplicated opioid dependence (304 00) (F11 20)   41  Ventral hernia without obstruction or gangrene (553 20) (K43 9)   42  Visit for screening mammogram (V76 12) (Z12 31)   43  Vitamin D deficiency (268 9) (E55 9)    Past Medical History   1  History of Arthritis (V13 4)   2   History of Benign Polyps Of The Large Intestine (V12 72)   3  Chronic bilateral low back pain (724 2,338 29) (M54 5,G89 29)   4  History of endocarditis (V12 59) (Z86 79)   5  History of hypertension (V12 59) (Z86 79)   6  History of Stroke Syndrome (436)   7  History of Stroke Syndrome (436)    Surgical History   1  History of Cataract Surgery   2  History of Gallbladder Surgery   3  History of Hemilaminect Decompress Part Facetectomy 1 Lumbar Interspace   4  History of Mitral Valve Replacement   5  History of Neuroplasty Decompression Median Nerve At Carpal Tunnel   6  History of Rotator Cuff Repair    Family History   Mother    1  Family history of Disorders Of Blood And Blood-forming Organs (V18 3)  Father    2  Family history of Acute Myocardial Infarction (V17 3)  Maternal Grandfather    3  Family history of Cancer  Paternal Grandfather    4  Family history of Heart Disease (V17 49)  Family History    5  Family history of Diabetes Mellitus (V18 0)   6  Family history of Family Health Status 2  Children Living   7  Family history of Family Health Status Siblings 5  Living   8  Family history of Maternal Grandfather Is    5  Family history of Maternal Grandmother Is    8  Family history of Paternal Grandfather Is    6  Family history of Paternal Grandmother Is     Social History    · Denied: History of Alcohol Use (History)   · Denied: History of Drug Use   · Marital History -    · Never a smoker   · No secondhand smoke exposure (V49 89) (Z78 9)   · Occupation: Retired   · Retired From Work    Current Meds    1  Atenolol 50 MG Oral Tablet; TAKE 1 TABLET EVERY DAY; Therapy: 63DRV9842 to (Evaluate:02Yxu2547)  Requested for: 73Yah9051; Last     Rx:30Syw8486 Ordered   2  Gabapentin 300 MG Oral Capsule; Take 2 capsules three times daily; Therapy: 57TTX8460 to (Evaluate:2017)  Requested for: 26Yfo7973; Last     Rx:49Exw0313 Ordered   3   Losartan Potassium-HCTZ 100-12 5 MG Oral Tablet; take 1 tablet every day; Therapy: 83ZHL2909 to (Evaluate:82Ary8034)  Requested for: 01DDD4225; Last     Rx:59Zhi0109 Ordered   4  MetFORMIN HCl  MG Oral Tablet Extended Release 24 Hour; take 1 tablet every     day; Therapy: 15XSE4051 to (Evaluate:84Zco4634)  Requested for: 66UFR3203; Last     Rx:81Fwj3989 Ordered   5  Pravastatin Sodium 40 MG Oral Tablet; TAKE 1 TABLET EVERY DAY; Therapy: 74RKX1446 to (Evaluate:14Jun2018)  Requested for: 13BDO6554; Last     Rx:19Jun2017 Ordered   6  TraMADol HCl - 50 MG Oral Tablet; Take 1 tablet 3 times daily as needed; Therapy: 78UTU5116 to (Evaluate:10Oct2017)  Requested for: 42Pbg8412; Last     Rx:48Rjq0910 Ordered   7  Warfarin Sodium 5 MG Oral Tablet; take 1 tablet every day as directed; Therapy: 17DJC1221 to (Jarett Triana)  Requested for: 57Wam7914; Last     Rx:19Ppf1047 Ordered    Allergies   1  Lisinopril TABS    Vitals   Vital Signs    Recorded: 27Dec2017 09:43AM   Temperature 98 F   Heart Rate 78   Systolic 569   Diastolic 60   Height 5 ft 4 in   Weight 163 lb    BMI Calculated 27 98   BSA Calculated 1 79   Pain Scale 5     Physical Exam        Constitutional      General appearance: Well developed, well nourished, alert, in no distress, non-toxic and no overt pain behavior  Eyes      Sclera: anicteric      HEENT      Hearing grossly intact  Pulmonary      Respiratory effort: Even and unlabored  Psychiatric      Mood and affect: Mood and affect appropriate  Neurologic      Cranial nerves: Cranial nerves II-XII grossly intact  Musculoskeletal      Gait and station: Abnormal  -- using a cane to assist with ambulation  Lumbar/Sacral Spine examination demonstrates Lumbosacral Spine:      Tenderness: right paraspinal-- and-- the right sacroiliac joint  Flexion was restricted-- and-- was painful  Extension was restricted-- and-- was painful  Left lateral flexion was not restricted-- and-- was painless   Right lateral flexion was restricted-- and-- was painful  Rotation to the left was not restricted-- and-- was painless  Rotation to the right was not restricted-- and-- was painless  Foot and ankle strength was normal bilaterally  Knee strength was normal bilaterally  Hip strength was normal bilaterally  Evaluation of Muscle Stretch Reflexes on the right side demonstrates 2/4 Knee Jerk Reflex-- and-- 2/4 Ankle Jerk Reflex  Evaluation of Muscle Stretch Reflexes on the left side demonstrates 2/4 Knee Jerk Reflex-- and-- 2/4 Ankle Jerk Reflex  Special Tests: equivocal Slump test on right, but-- negative Slump test on left  Results/Data   Results Free Text Form [facility]:    Results        Other  Gabapentin this am 12/27/2017 12/24/2017        Future Appointments      Date/Time Provider Specialty Site   01/24/2018 11:40 AM Ria Luther DO Cardiology  CARDIOLOGY  [facility]   03/06/2018 02:00 PM Virgil Villalba, [facility] AND [facility]     Signatures    Electronically signed by : Jamila Owen; Dec 27 2017 10:02AM EST                       (Author)     Electronically signed by : Adolph Fabry, DO; Dec 27 2017 10:19AM EST

## 2018-01-03 ENCOUNTER — GENERIC CONVERSION - ENCOUNTER (OUTPATIENT)
Dept: OTHER | Facility: OTHER | Age: 83
End: 2018-01-03

## 2018-01-03 ENCOUNTER — APPOINTMENT (OUTPATIENT)
Dept: PHYSICAL THERAPY | Facility: REHABILITATION | Age: 83
End: 2018-01-03
Payer: MEDICARE

## 2018-01-03 ENCOUNTER — APPOINTMENT (OUTPATIENT)
Dept: LAB | Facility: CLINIC | Age: 83
End: 2018-01-03
Payer: MEDICARE

## 2018-01-03 DIAGNOSIS — I48.91 ATRIAL FIBRILLATION (HCC): ICD-10-CM

## 2018-01-03 DIAGNOSIS — Z79.01 LONG TERM CURRENT USE OF ANTICOAGULANT: ICD-10-CM

## 2018-01-03 LAB
INR PPP: 3.5 (ref 0.86–1.16)
INR PPP: 3.5 (ref 0.86–1.16)
PROTHROMBIN TIME: 35.7 SECONDS (ref 12.1–14.4)

## 2018-01-03 PROCEDURE — 36415 COLL VENOUS BLD VENIPUNCTURE: CPT

## 2018-01-03 PROCEDURE — 85610 PROTHROMBIN TIME: CPT

## 2018-01-03 PROCEDURE — 97110 THERAPEUTIC EXERCISES: CPT

## 2018-01-04 ENCOUNTER — GENERIC CONVERSION - ENCOUNTER (OUTPATIENT)
Dept: OTHER | Facility: OTHER | Age: 83
End: 2018-01-04

## 2018-01-05 ENCOUNTER — APPOINTMENT (OUTPATIENT)
Dept: PHYSICAL THERAPY | Facility: REHABILITATION | Age: 83
End: 2018-01-05
Payer: MEDICARE

## 2018-01-05 PROCEDURE — 97110 THERAPEUTIC EXERCISES: CPT

## 2018-01-08 ENCOUNTER — APPOINTMENT (OUTPATIENT)
Dept: PHYSICAL THERAPY | Facility: REHABILITATION | Age: 83
End: 2018-01-08
Payer: MEDICARE

## 2018-01-08 PROCEDURE — 97110 THERAPEUTIC EXERCISES: CPT

## 2018-01-08 PROCEDURE — G8979 MOBILITY GOAL STATUS: HCPCS

## 2018-01-08 PROCEDURE — 97112 NEUROMUSCULAR REEDUCATION: CPT

## 2018-01-08 PROCEDURE — 97140 MANUAL THERAPY 1/> REGIONS: CPT

## 2018-01-08 PROCEDURE — 97530 THERAPEUTIC ACTIVITIES: CPT

## 2018-01-08 PROCEDURE — G8980 MOBILITY D/C STATUS: HCPCS

## 2018-01-09 NOTE — MISCELLANEOUS
Message   Recorded as Task   Date: 08/11/2016 11:16 AM, Created By: Serena Plascencia   Task Name: Medical Complaint Callback   Assigned To: [address] [address] clinical,Team   Regarding Patient: Rama Parr, Status: In Progress   CommentMacario Cruise - 11 Aug 2016 11:16 AM     TASK CREATED  Caller: Self; Medical Complaint; [phone] (Home); [phone] (Work)  Info from patient- RX from [facility]  RX's prescribed by Dr Latoya Coates-  Tramadol 50mg TID prn filled 5/18/16 #90  she is down to #60    FYI to Irma Thornton - 11 Aug 2016 12:06 PM     TASK REPLIED TO: Previously Assigned To [address] clinical,Team  Can you please let her know that I faxed a 30 day script of 90 pills for tramadol with 2 refills to her [employer] today  Yesterday I refilled her gabapentin and sent a 90 day script for that with one refill  She should f/u in 3 months as discussed at her OV yesterday  Thank you  Divya Yeager - 11 Aug 2016 1:01 PM     TASK EDITED         I spoke with pt, advised above  pt states she does have OV scheduled  ***********        Active Problems    1  Analgesic use (V58 69) (Z79 899)   2  Anticoagulated by anticoagulation treatment (V58 61) (Z79 01)   3  Arthritis (716 90) (M19 90)   4  Atrial fibrillation (427 31) (I48 91)   5  Backache (724 5) (M54 9)   6  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)   7  Burning sensation (782 0) (R20 8)   8  Chronic bilateral low back pain (724 2,338 29) (M54 5,G89 29)   9  Chronic lumbar radiculopathy (724 4) (M54 16)   10  Coagulation defect (286 9) (D68 9)   11  Current use of long term anticoagulation (V58 61) (Z79 01)   12  Disc degeneration, 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)   13  Encounter for Medicare annual wellness exam (V70 0) (Z00 00)   14  Encounter for screening colonoscopy (V76 51) (Z12 11)   15  Encounter for screening mammogram for malignant neoplasm of breast (V76 12)    (Z12 31)   16   H/O mitral valve replacement with mechanical valve (V43 3) (Z95 2) 17  Hematuria (599 70) (R31 9)   18  Hyperlipidemia (272 4) (E78 5)   19  Hypertension (401 9) (I10)   20  Limb pain (729 5) (M79 609)   21  Limb Weakness (Paresis) (728 87)   22  Lumbar stenosis with neurogenic claudication (724 03) (M48 06)   23  History of Mitral Valve Replacement   24  Myofascial pain syndrome (729 1) (M79 1)   25  Osteopenia (733 90) (M85 80)   26  Pain syndrome, chronic (338 4) (G89 4)   27  Sacroiliitis (720 2) (M46 1)   28  Senile purpura (287 2) (D69 2)   29  Spinal stenosis (724 00) (M48 00)   30  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 myelopathy or radiculopathy (721 3)    (M47 817)   31  Tendonitis Of The Left Shoulder (726 10)   32  Type 2 diabetes mellitus (250 00) (E11 9)   33  Visit for screening mammogram (V76 12) (Z12 31)   34  Vitamin D deficiency (268 9) (E55 9)    Current Meds   1  Atenolol 50 MG Oral Tablet; take one tablet by mouth every day; Therapy: 97ASK9174 to (0493 28 62 88)  Requested for: 69MRN0935; Last   Rx:85Kcg7476 Ordered   2  Gabapentin 300 MG Oral Capsule; Take 2 capsules three times daily; Therapy: 73VTA4734 to (Cady Werner)  Requested for: 79Npy2824; Last   Rx:94Teh8926 Ordered   3  Losartan Potassium-HCTZ 100-12 5 MG Oral Tablet; TAKE 1 TABLET DAILY; Therapy: 67FVE3686 to (KTXHYQFF:87AXZ6915)  Requested for: 59TJU1256; Last   Rx:33Pfe2875 Ordered   4  MetFORMIN HCl  MG Oral Tablet Extended Release 24 Hour; take one tablet by   mouth every day; Therapy: 39EVG0992 to (LAYXLPII:46EXK4818)  Requested for: 73XRC2018; Last   Rx:51Hvn4055 Ordered   5  Pravastatin Sodium 40 MG Oral Tablet; Take 1 tablet daily; Therapy: 71CLF2272 to (Evaluate:65Wsz4240)  Requested for: 25CUK0314; Last   Rx:91Ddu0299 Ordered   6  TraMADol HCl - 50 MG Oral Tablet; Take 1 tablet 3 times daily as needed; Therapy: 81EER9219 to (Larry Kovacs)  Requested for: 11Aug2016; Last   Rx:11Aug2016 Ordered   7   Warfarin Sodium 5 MG Oral Tablet; TAKE 1 TABLET DAILY AS DIRECTED; Therapy: 32FHC5187 to (Beni Stinson)  Requested for: 63TEN2105; Last   Rx:25Rrn9309 Ordered    Allergies    1  Lisinopril TABS    Signatures   Electronically signed by :  Jeffery Valenzuela, ; Aug 11 2016  1:02PM EST                       (Author)

## 2018-01-09 NOTE — PROGRESS NOTES
REPORT NAME: Progress Notes Report  VISIT DATE: 6/28/2017  VISIT TIME: 4:14 PM EDT  PATIENT NAME: Heavenly Amador  MEDICAL RECORD NUMBER: [id number]  YOB: 1934  AGE: 80  REFERRING PHYSICIAN: Aidee Cortes  SUPERVISING CLINICIAN: [address] CARE PROVIDER: Lamin Casanova  PATIENT HOME ADDRESS: [address], [facility] [address]  PATIENT HOME PHONE: [phone]  SOCIAL SECURITY NUMBER:   DIAGNOSIS 1: Mitral Valve Replacement / 424 0  DIAGNOSIS 2:   INR RANGE: 2 5 - 3 5  INR GOAL: 3  TREATMENT START DATE:   TREATMENT END DATE:   NEXT VISIT: 7/10/2017  [facility]  VISIT RESULTS  ENCOUNTER NUMBER:   TEST LOCATION: [facility]  TEST TYPE: Outside Lab (Venipuncture)  VISIT TYPE:   CURRENT INR: 2 51 PROTIME:   SPECIMEN COL AND RPT DATE: 6/28/2017 4:14 PM  EDT  VITAL SIGNS  PULSE:  BP: / WEIGHT:  HEIGHT:  TEMP:   CURRENT ANTICOAGULANT DOSING SCHEDULE  DOSE SIZE: 5mg    ANTICOAGULANT TYPE: WARFARIN  DOSING REGIMEN  Sun       Mon       Tues      Wed       Thurs     Fri       Sat  Total/Wk  5         5         5         5         5         5         5         35  PATIENT MEDICATION INSTRUCTION: No  PATIENT NUTRITIONAL COUNSELING: No  PATIENT BRUISING INSTRUCTION: No  LAST EDUCATION DATE:   PREVIOUS VISIT INFORMATION  VISITDATE  INRGoal INR   Sun    Mon    Tues   Wed    Thurs  Fri    Sat  Total/wk  6/28/2017   3       2 51  5      5      5      5      5      5      5  35  6/21/2017   3       1 65  5      5      5      7 5    5      5      5  37 5  6/14/2017   3       2 18  2 5    5      5      5      5      5      5  32 5  6/12/2017   3       5 6   0      HOLD   HOLD   0      0      0      0  0  ADDITIONAL PREVIOUS VISIT INFORMATION  VISITDATE   PRIMARY RX               DOSE      CrCl  6/28/2017   WARFARIN                 5mg  6/21/2017   WARFARIN                 5mg  6/14/2017   WARFARIN                 5mg  6/12/2017   WARFARIN                 5mg  OTHER CURRENT MEDICATIONS:  WARFARIN, WARFARIN  PROGRESS NOTES:   PATIENT INSTRUCTIONS: pt called recheck on 7/10/17  TEST LOCATION: [facility], , ,   INBOUND LAB DATA:  Lab       Lab Value Col Date                 Rpt Date                 Lab  Reference Range  Electronically signed by: Lolis Rivers on 6/28/2017 4:14 PM EDT

## 2018-01-09 NOTE — PROGRESS NOTES
REPORT NAME: Progress Notes Report  VISIT DATE: 5/3/2017  VISIT TIME: 4:28 PM EDT  PATIENT NAME: Heavenly Amador  MEDICAL RECORD NUMBER: [id number]  YOB: 1934  AGE: 80  REFERRING PHYSICIAN: Aidee Cortes  SUPERVISING CLINICIAN: [address] CARE PROVIDER: [facility]  PATIENT HOME ADDRESS: [address], [address]  PATIENT HOME PHONE: [phone]  SOCIAL SECURITY NUMBER:   DIAGNOSIS 1: Mitral Valve Replacement / 424 0  DIAGNOSIS 2:   INR RANGE: 2 5 - 3 5  INR GOAL: 3  TREATMENT START DATE:   TREATMENT END DATE:   NEXT VISIT: 5/10/2017  [facility]  VISIT RESULTS  ENCOUNTER NUMBER:   TEST LOCATION: [facility]  TEST TYPE: Outside Lab (Venipuncture)  VISIT TYPE: Phone Consult  CURRENT INR: 1 78 PROTIME: 20 9  SPECIMEN COL AND RPT DATE: 5/3/2017 4:28  PM EDT  VITAL SIGNS  PULSE:  BP: / WEIGHT:  HEIGHT:  TEMP:   CURRENT ANTICOAGULANT DOSING SCHEDULE  DOSE SIZE: 5mg    ANTICOAGULANT TYPE: WARFARIN  DOSING REGIMEN  Sun       Mon       Tues      Wed       Thurs     Fri       Sat  Total/Wk  5         5         5         7 5       5         5         5         37 5  PATIENT MEDICATION INSTRUCTION: Yes  PATIENT NUTRITIONAL COUNSELING: Yes  PATIENT BRUISING INSTRUCTION: No  LAST EDUCATION DATE:   PREVIOUS VISIT INFORMATION  VISITDATE  INRGoal INR   Sun    Mon    Tues   Wed    Thurs  Fri    Sat  Total/wk  5/3/2017    3       1 78  5      5      5      7 5    5      5      5  37 5  4/26/2017   3       1 69  5      2 5    5      7 5    5      5      5  35  5      2 5    5      5      5      5      5  32 5  4/17/2017   3       3 63  5      2 5    5      5      5      2 5    5  30  3/30/2017   3       3 03  5      5      5      5      5      5      2 5  32 5  ADDITIONAL PREVIOUS VISIT INFORMATION  VISITDATE   PRIMARY RX               DOSE      CrCl  5/3/2017    WARFARIN                 5mg  4/26/2017   WARFARIN                 5mg  4/17/2017   WARFARIN 5mg  3/30/2017   WARFARIN                 5mg  OTHER CURRENT MEDICATIONS:  WARFARIN, WARFARIN  PROGRESS NOTES:   PATIENT INSTRUCTIONS: 5/3/17, tc pt, denies any med or diet changes  will  take 7 5 mg x 1 then 5 mg daily, rech 1 week, 5/10  bharathi  TEST LOCATION: [facility], , ,   INBOUND LAB DATA:  Lab       Lab Value Col Date                 Rpt Date                 Lab  Reference Range  Electronically signed by:  Angel Robert on 5/3/2017 4:28 PM EDT

## 2018-01-10 ENCOUNTER — APPOINTMENT (OUTPATIENT)
Dept: PHYSICAL THERAPY | Facility: REHABILITATION | Age: 83
End: 2018-01-10
Payer: MEDICARE

## 2018-01-10 NOTE — PROGRESS NOTES
REPORT NAME: Progress Notes Report  VISIT DATE: 3/30/2017  VISIT TIME: 1:35 PM EDT  PATIENT NAME: Ros Marcelo  MEDICAL RECORD NUMBER: [id number]  YOB: 1934  AGE: 80  REFERRING PHYSICIAN: Karlos Matta  SUPERVISING CLINICIAN: [address] CARE PROVIDER: Fabricio Nixon  PATIENT HOME ADDRESS: [address], [address]  PATIENT HOME PHONE: [phone]  SOCIAL SECURITY NUMBER:   DIAGNOSIS 1: Mitral Valve Replacement / [id number] 0  DIAGNOSIS 2:   INR RANGE: 2 5 - 3 5  INR GOAL: 3  TREATMENT START DATE:   TREATMENT END DATE:   NEXT VISIT: 4/17/2017  [facility]  VISIT RESULTS  ENCOUNTER NUMBER:   TEST LOCATION: [facility]  TEST TYPE: Outside Lab (Venipuncture)  VISIT TYPE: Phone Consult  CURRENT INR: 3 03 PROTIME: 30 8  SPECIMEN COL AND RPT DATE: 3/30/2017 1:35  PM EDT  VITAL SIGNS  PULSE:  BP: / WEIGHT:  HEIGHT:  TEMP:   CURRENT ANTICOAGULANT DOSING SCHEDULE  DOSE SIZE: 5mg    ANTICOAGULANT TYPE: WARFARIN  DOSING REGIMEN  Sun       Mon       Tues      Wed       Thurs     Fri       Sat  Total/Wk  5         5         5         5         5         5         2 5       32 5  PATIENT MEDICATION INSTRUCTION: Yes  PATIENT NUTRITIONAL COUNSELING: No  PATIENT BRUISING INSTRUCTION: No  LAST EDUCATION DATE:   PREVIOUS VISIT INFORMATION  VISITDATE  INRGoal INR   Sun    Mon    Tues   Wed    Thurs  Fri    Sat  Total/wk  3/30/2017   3       3 03  5      5      5      5      5      5      2 5  32 5  3/23/2017   3       2 72  5      5      5      5      5      5      2 5  32 5  3/20/2017   3       1 83  5      5      7 5    5      0      0      0  22 5  3/16/2017   3       1 22  0      0      0      0      HOLD   0      0  0  ADDITIONAL PREVIOUS VISIT INFORMATION  VISITDATE   PRIMARY RX               DOSE      CrCl  3/30/2017   WARFARIN                 5mg  3/23/2017   WARFARIN                 5mg  3/20/2017   WARFARIN                 5mg  3/16/2017   WARFARIN                 5mg  OTHER CURRENT MEDICATIONS:  WARFARIN, WARFARIN  PROGRESS NOTES:   PATIENT INSTRUCTIONS: 3/31/17, tc pt, cont current dose, rech 2 weeks,  4/17  bharathi  TEST LOCATION: [facility], , ,   INBOUND LAB DATA:  Lab       Lab Value Col Date                 Rpt Date                 Lab  Reference Range  Electronically signed by:  Sierra Robert on 3/31/2017 1:35 PM EDT

## 2018-01-10 NOTE — RESULT NOTES
Message   Recorded as Task   Date: 05/19/2016 01:56 PM, Created By: [facility]   Task Name: Follow Up   Assigned To: [id number] [address] clinical,Team   Regarding Patient: Ata Herrera, Status: Active   Comment:    Divya Yeager - 19 May 2016 1:56 PM     TASK CREATED    Pt S/P RT L3-4 L4-5  L5-S1 RFA~5/18/16~Dr Kelle Harada  follow up 5/27/16 at [phone] with Dr Kelle Harada to discuss leg pain  6 wk follow up 6/27/16 at 1315 from RFA  Attempted to contact pt, LMOM for pt to [facility]  ***********   Divya Yeager - 19 May 2016 2:34 PM     TASK IN PROGRESS   Divya Yeager - 19 May 2016 2:42 PM     TASK EDITED    I spoke with pt, states she is doing good, still a little stiff  Was able to go to [facility] today  No S/S infection  Pt will call with questions or concerns prior to OV 5/27/16  ***********   [facility] - 19 May 2016 2:56 PM     TASK REPLIED TO: Previously Assigned To [facility]  aware agree        Signatures   Electronically signed by :  Terri Brewster, ; May 20 2016  8:31AM EST                       (Author)

## 2018-01-10 NOTE — PROGRESS NOTES
REPORT NAME: Patient Visit Summary Report   VISIT DATE: 2/8/2017  VISIT TIME: 2:37 PM EST  PATIENT NAME: Sarahy Randhawa  MEDICAL RECORD NUMBER: [id number]  SOCIAL SECURITY NUMBER:   YOB: 1934  AGE: 80  REFERRING PHYSICIAN: Filiberto De La Cruz  SUPERVISING CLINICIAN: [address] CARE PROFESSIONAL: Judd Nixon  PATIENT HOME ADDRESS: [address]  PATIENT HOME PHONE: [phone]  DIAGNOSIS 1: Mitral Valve Replacement / 424 0  DIAGNOSIS 2:   DIAGNOSIS 3:   DIAGNOSIS 4:   INR RANGE: 2 5 - 3 5  INR GOAL: 3  TREATMENT START DATE:   TREATMENT END DATE:   NEXT VISIT: 2/10/2017  [facility]    VISIT RESULTS   ENCOUNTER NUMBER:   TEST LOCATION: [facility]  TEST TYPE: Outside Lab (Venipuncture)  VISIT TYPE: Phone Consult  CURRENT INR: 5 59 PROTIME:   SPECIMEN COL AND RPT DATE: 2/8/2017 2:37 PM  EST    VITAL SIGNS  PULSE:  B/P:  WEIGHT:  HEIGHT:  TEMP:     CURRENT ANTICOAGULANT DOSING SCHEDULE  DOSE SIZE: 5mg    ANTICOAGULANT TYPE: WARFARIN  DOSING REGIMEN  Sun       Mon       Tues      Wed       Thurs     Fri       Sat  Total/Wk  0         0         0         HOLD      HOLD      0         0         0    PATIENT MEDICATION INSTRUCTION: Yes  PATIENT NUTRITIONAL COUNSELING: No  PATIENT BRUISING INSTRUCTION: Yes      LAST EDUCATION DATE:       PREVIOUS VISIT INFORMATION  VISITDATE   INR Goal  INR   Sun     Mon     Tues    Wed     Thurs   Fri  Sat     Total/wk  2/8/2017    3         5 59  0       0       0       HOLD    HOLD    0  0       0  1/25/2017   3         2 26  5       5       5       5       5       5  5       35  1/17/2017   3         3 63  5       5       2 5     5       5       5  5       32 5  12/30/2016  3         2 89  5       5       5       5       5       5  5       35    ADDITIONAL PREVIOUS VISIT INFORMATION  VISITDATE   PRIMARY RX               DOSE      CrCl  2/8/2017    WARFARIN                 5mg                 1/25/2017   WARFARIN 5mg                 1/17/2017   WARFARIN                 5mg                 12/30/2016  WARFARIN                 5mg                     OTHER CURRENT MEDICATIONS: WARFARIN, WARFARIN      PROGRESS NOTES:     PATIENT INSTRUCTIONS: 2/9/17, TC PT, DR Alla Rubalcava AND DR TRUONG HAD CALLED PT  ON WED, 2/8  PT WAS INSTRUCTED TO HOLD X 2 W/TH, [facility] FRI 2/10  PT DENIES  ANY BLEDING  HAD BEEN ON ANTIBIOTICS FOR POSSIBLE RECATL INFECTION X 7  DAYS, LAST DOSE WAS 2/8  HERNANDO  TEST LOCATION: [facility]    Electronically signed by:  Kylah Robert on 2/9/2017 at 2:37 PM EST

## 2018-01-10 NOTE — PROGRESS NOTES
REPORT NAME: Patient Visit Summary Report   VISIT DATE: 7/1/2016  VISIT TIME: 3:36 PM EDT  PATIENT NAME: Marcello Madden  MEDICAL RECORD NUMBER: [id number]  SOCIAL SECURITY NUMBER:   YOB: 1934  AGE: 80  REFERRING PHYSICIAN: Aleena Potts  SUPERVISING CLINICIAN: [address] CARE PROFESSIONAL: Jose Juan Nixon  PATIENT HOME ADDRESS: [address], [address] [place], [place] [address]  PATIENT HOME PHONE: [phone]  DIAGNOSIS 1: Mitral Valve Replacement / 424 0  DIAGNOSIS 2:   DIAGNOSIS 3:   DIAGNOSIS 4:   INR RANGE: 2 5 - 3 5  INR GOAL: 3  TREATMENT START DATE:   TREATMENT END DATE:   NEXT VISIT: 7/11/2016  [facility]    VISIT RESULTS   ENCOUNTER NUMBER:   TEST LOCATION: [facility]  TEST TYPE: Outside Lab (Venipuncture)  VISIT TYPE: Phone Consult  CURRENT INR: 2 5 PROTIME: 26 6  SPECIMEN COL AND RPT DATE: 7/1/2016 3:36 PM  EDT    VITAL SIGNS  PULSE:  B/P:  WEIGHT:  HEIGHT:  TEMP:     CURRENT ANTICOAGULANT DOSING SCHEDULE  DOSE SIZE: 5mg    ANTICOAGULANT TYPE: WARFARIN  DOSING REGIMEN  Sun       Mon       Tues      Wed       Thurs     Fri       Sat  Total/Wk  2 5       5         5         5         5         5         5         32 5    PATIENT MEDICATION INSTRUCTION: Yes  PATIENT NUTRITIONAL COUNSELING: No  PATIENT BRUISING INSTRUCTION: No      LAST EDUCATION DATE:       PREVIOUS VISIT INFORMATION  VISITDATE   INR Goal  INR   Sun     Mon Tues Wed Thurs Fri  Sat     Total/wk  7/1/2016    3         2 5   2 5     5       5       5       5       5  5       32 5  6/28/2016   3         1 36  0       0       0       10      10      0  0       20  5/23/2016   3         2 84  2 5     5       5       5       5       5  5       32 5  4/27/2016   3         2 6   2 5     5       5       5       5       5  5       32 5    ADDITIONAL PREVIOUS VISIT INFORMATION  VISITDATE   PRIMARY RX               DOSE      CrCl  7/1/2016    WARFARIN                 5mg                 6/28/2016 WARFARIN                 5mg                 5/23/2016   WARFARIN                 5mg                 4/27/2016   WARFARIN                 5mg                     OTHER CURRENT MEDICATIONS: WARFARIN, WARFARIN      PROGRESS NOTES:     PATIENT INSTRUCTIONS: 7/1/16, tc pt, resume previous dose, rech 7-10 days,  7/11  bharathi  TEST LOCATION: [facility]    Electronically signed by:  Kylah Robert on 7/1/2016 at 3:36 PM EDT

## 2018-01-10 NOTE — PROGRESS NOTES
REPORT NAME: Progress Notes Report  VISIT DATE: 11/7/2017  VISIT TIME: 2:39 PM EST  PATIENT NAME: Randy Claros  MEDICAL RECORD NUMBER: [id number]  YOB: 1934  AGE: 80  REFERRING PHYSICIAN: Caitlin Almodovar  SUPERVISING CLINICIAN: [address] CARE PROVIDER: Jane Nixon  PATIENT HOME ADDRESS: [address], [facility] [address] [address]  PATIENT HOME PHONE: [phone]  SOCIAL SECURITY NUMBER:   DIAGNOSIS 1: Mitral Valve Replacement / 424 0  DIAGNOSIS 2:   INR RANGE: 2 5 - 3 5  INR GOAL: 3  TREATMENT START DATE:   TREATMENT END DATE:   NEXT VISIT: 11/21/2017  [facility]  VISIT RESULTS  ENCOUNTER NUMBER:   TEST LOCATION: [facility]  TEST TYPE: Outside Lab (Venipuncture)  VISIT TYPE: Phone Consult  CURRENT INR: 2 33 PROTIME: 25 8  SPECIMEN COL AND RPT DATE: 11/7/2017 2:39  PM EST  VITAL SIGNS  PULSE:  BP: / WEIGHT:  HEIGHT:  TEMP:   CURRENT ANTICOAGULANT DOSING SCHEDULE  DOSE SIZE: 5mg    ANTICOAGULANT TYPE: WARFARIN  DOSING REGIMEN  Sun       Mon       Tues      Wed       Thurs     Fri       Sat  Total/Wk  5         5         2 5       5         5         5         5         32 5  PATIENT MEDICATION INSTRUCTION: Yes  PATIENT NUTRITIONAL COUNSELING: No  PATIENT BRUISING INSTRUCTION: No  LAST EDUCATION DATE:   PREVIOUS VISIT INFORMATION  VISITDATE  INRGoal INR   Sun    Mon    Tues   Wed    Thurs  Fri    Sat  Total/wk  11/7/2017   3       2 33  5      5      2 5    5      5      5      5  32 5  10/17/2017  3       2 67  5      5      2 5    5      5      5      5  32 5  10/3/2017   3       3 06  5      5      2 5    5      5      5      5  32 5  9/26/2017   3       3 87  5      5      2 5    5      5      5      5  32 5  ADDITIONAL PREVIOUS VISIT INFORMATION  VISITDATE   PRIMARY RX               DOSE      CrCl  11/7/2017   WARFARIN                 5mg  10/17/2017  WARFARIN                 5mg  10/3/2017   WARFARIN                 5mg  9/26/2017   WARFARIN                 5mg  OTHER CURRENT MEDICATIONS:  WARFARIN, WARFARIN  PROGRESS NOTES:   PATIENT INSTRUCTIONS: 11/8/17, tc pt, increase dose 5 mg daily, rech 2  weeks, 11/21  bharathi  TEST LOCATION: [facility], , ,   INBOUND LAB DATA:  Lab       Lab Value Col Date                 Rpt Date                 Lab  Reference Range  Electronically signed by:  Kimberlyn Robert on 11/8/2017 2:39 PM EST

## 2018-01-10 NOTE — MISCELLANEOUS
Message   Recorded as Task   Date: 06/02/2016 12:05 PM, Created By: Seth Atkinson   Task Name: Care Coordination   Assigned To: Tenisha Garcia   Regarding Patient: Malika Kovacs, Status: Complete   Comment:    Seth Atkinson - 02 Jun 2016 12:05 PM     TASK CREATED  Care Coordination  PATIENT CALLED REPORTING SHE IS SCHEDULED FOR [facility]IAL [facility]  PER DR DAVISON, PATIENT WILL NEED TO STOP WARFARIN, REQUIRES BRIDGING  WITH  LOVENOX  80 MG SQ INJECTIONS  LAST DOSE OF WARFARIN WILL BE 6/16/16  HOLD WARFARIN 6/17,6/18, 6/19,6/20, 6/21  BEGIN LOVENOX 80 MG SQ EVERY 12 HOURS ON 6/19, AND 6/20  DO NOT ADMINISTER LOVENOX ON 6/21 OR 6/22  EPIDURAL INJECTION SCHEDULED 6/22/16  THE EVENING OF JUNE 22, RESTART WARFARIN AT PREVIOUS DOSE,  2 5 MG SUNDAY,  5 MG ALL THE OTHER DAYS OF THE WEEK  THE AM OF 6/23, RESTART LOVENOX INJECTIONS IN AM EVERY 12 HOURS, CONTINUE LOVENOX INJECTION EVERY 12 HOURS 6/23, 6/24, 6/25, 6/26  CONTINUE WARFARIN  PT/INR DUE 6/27/16  Margoth Davison - 02 Jun 2016 3:26 PM     TASK COMPLETED        Active Problems    1  Analgesic use (V58 69) (Z79 899)   2  Anticoagulated by anticoagulation treatment (V58 61) (Z79 01)   3  Arthritis (716 90) (M19 90)   4  Atrial fibrillation (427 31) (I48 91)   5  Backache (724 5) (M54 9)   6  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)   7  Burning sensation (782 0) (R20 8)   8  Coagulation defect (286 9) (D68 9)   9  Current use of long term anticoagulation (V58 61) (Z79 01)   10  Disc degeneration, 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)   11  Encounter for Medicare annual wellness exam (V70 0) (Z00 00)   12  Encounter for screening colonoscopy (V76 51) (Z12 11)   13  Encounter for screening mammogram for malignant neoplasm of breast (V76 12)    (Z12 31)   14  Hematuria (599 70) (R31 9)   15  Hyperlipidemia (272 4) (E78 5)   16  Hypertension (401 9) (I10)   17  Limb pain (729 5) (D16 011)   18  Limb Weakness (Paresis) (728 87)   19  Low back pain (724 2) (M54 5)   20  Lumbar radiculopathy (724 4) (M54 16)   21  Lumbar stenosis with neurogenic claudication (724 03) (M48 06)   22  History of Mitral Valve Replacement   23  Myofascial pain syndrome (729 1) (M79 1)   24  Osteopenia (733 90) (M85 80)   25  Pain syndrome, chronic (338 4) (G89 4)   26  Sacroiliitis (720 2) (M46 1)   27  Senile purpura (287 2) (D69 2)   28  Spinal stenosis (724 00) (M48 00)   29  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 myelopathy or radiculopathy (721 3)    (M47 817)   30  Tendonitis Of The Left Shoulder (726 10)   31  Type 2 diabetes mellitus (250 00) (E11 9)   32  Visit for screening mammogram (V76 12) (Z12 31)   33  Vitamin D deficiency (268 9) (E55 9)    Current Meds   1  Atenolol 50 MG Oral Tablet; take one tablet by mouth every day; Therapy: 74ZXJ3738 to (Chau Crawford)  Requested for: 12SAS5940; Last   Rx:[id number] Ordered   2  Enoxaparin Sodium 80 MG/0 8ML Subcutaneous Solution (Lovenox); take 80 mg every   12 hours as directed by MD;   Therapy: 33OAU8657 to (Evaluate:10Jun2016); Last Rx:03Jun2016 Ordered   3  Gabapentin 300 MG Oral Capsule; TAKE 2 CAPSULES BY MOUTH 3 TIMES A DAY; Therapy: 53SWK0532 to (Evaluate:80Neg0012)  Requested for: 04SCI2649; Last   Rx:38Yvs5915 Ordered   4  Losartan Potassium-HCTZ 100-12 5 MG Oral Tablet; TAKE 1 TABLET DAILY; Therapy: 42NIC2809 to (KPJPNPKB:76HGM6342)  Requested for: 76CEG0698; Last   Rx:79Sfv5107 Ordered   5  MetFORMIN HCl  MG Oral Tablet Extended Release 24 Hour; take one tablet by   mouth every day; Therapy: 95DQP8465 to (KOSZLBBS:29UNU1014)  Requested for: 92SGP7192; Last   Rx:63Lzi7404 Ordered   6  Pravastatin Sodium 40 MG Oral Tablet; Take 1 tablet daily; Therapy: 27SME8185 to (Evaluate:02Vdb4410)  Requested for: 60NUI0637; Last   Rx:33Mcg0905 Ordered   7  TraMADol HCl - 50 MG Oral Tablet; Take 1 tablet 3 times daily as needed;    Therapy: 56PAF6880 to ([id number])  Requested for: 00IQO5534; Last   Rx:38Zrk4329 Ordered   8  Warfarin Sodium 5 MG Oral Tablet; TAKE 1 TABLET DAILY AS DIRECTED; Therapy: 68PSW1388 to (Anu Soriano)  Requested for: 20QEK6442; Last   Rx:40Xwb3804 Ordered    Allergies    1  Lisinopril TABS    Signatures   Electronically signed by :  Chet Robert, ; Jun 8 2016  8:32AM EST                       (Administrative)

## 2018-01-11 NOTE — PROGRESS NOTES
REPORT NAME: Progress Notes Report  VISIT DATE: 10/3/2017  VISIT TIME: 4:15 PM EDT  PATIENT NAME: Brandon Gar  MEDICAL RECORD NUMBER: [id number]  YOB: 1934  AGE: 80  REFERRING PHYSICIAN: Juliocesar Coffey  SUPERVISING CLINICIAN: [address] CARE PROVIDER: Tariq Nixon  PATIENT HOME ADDRESS: [address], [facility] [facility] [address]  PATIENT HOME PHONE: [phone]  SOCIAL SECURITY NUMBER:   DIAGNOSIS 1: Mitral Valve Replacement / 424 0  DIAGNOSIS 2:   INR RANGE: 2 5 - 3 5  INR GOAL: 3  TREATMENT START DATE:   TREATMENT END DATE:   NEXT VISIT: 10/17/2017  [facility]  VISIT RESULTS  ENCOUNTER NUMBER:   TEST LOCATION: [facility]  TEST TYPE: Outside Lab (Venipuncture)  VISIT TYPE: Phone Consult  CURRENT INR: 3 06 PROTIME: 32 1  SPECIMEN COL AND RPT DATE: 10/3/2017 4:15  PM EDT  VITAL SIGNS  PULSE:  BP: / WEIGHT:  HEIGHT:  TEMP:   CURRENT ANTICOAGULANT DOSING SCHEDULE  DOSE SIZE: 5mg    ANTICOAGULANT TYPE: WARFARIN  DOSING REGIMEN  Sun       Mon       Tues      Wed       Thurs     Fri       Sat  Total/Wk  5         5         2 5       5         5         5         5         32 5  PATIENT MEDICATION INSTRUCTION: Yes  PATIENT NUTRITIONAL COUNSELING: No  PATIENT BRUISING INSTRUCTION: No  LAST EDUCATION DATE:   PREVIOUS VISIT INFORMATION  VISITDATE  INRGoal INR   Sun    Mon    Tues   Wed    Thurs  Fri    Sat  Total/wk  10/3/2017   3       3 06  5      5      2 5    5      5      5      5  32 5  9/26/2017   3       3 87  5      5      2 5    5      5      5      5  32 5  8/28/2017   3       3 32  5      5      5      5      5      5      5  35  7/31/2017   3       3 17  5      5      5      5      5      5      5  35  ADDITIONAL PREVIOUS VISIT INFORMATION  VISITDATE   PRIMARY RX               DOSE      CrCl  10/3/2017   WARFARIN                 5mg  9/26/2017   WARFARIN                 5mg  8/28/2017   WARFARIN                 5mg  7/31/2017   WARFARIN                 5mg  OTHER CURRENT MEDICATIONS:  WARFARIN, WARFARIN  PROGRESS NOTES:   PATIENT INSTRUCTIONS: 10/3/17, tc pt, cont current dose, rech2 weeks,  10/17  bharathi  TEST LOCATION: [facility], , ,   INBOUND LAB DATA:  Lab       Lab Value Col Date                 Rpt Date                 Lab  Reference Range  Electronically signed by:  Jose Juan Robert on 10/3/2017 4:15 PM EDT

## 2018-01-11 NOTE — PROGRESS NOTES
REPORT NAME: Patient Visit Summary Report   VISIT DATE: 11/29/2016  VISIT TIME: 4:33 PM EST  PATIENT NAME: Rama Parr  MEDICAL RECORD NUMBER: [id number]  SOCIAL SECURITY NUMBER:   YOB: 1934  AGE: 80  REFERRING PHYSICIAN: Leopoldo Pearce  SUPERVISING CLINICIAN: [address] CARE PROFESSIONAL: Queen Jeff Nixon  PATIENT HOME ADDRESS: [address], [address] [place], [place] [address]  PATIENT HOME PHONE: [phone]  DIAGNOSIS 1: Mitral Valve Replacement / 424 0  DIAGNOSIS 2:   DIAGNOSIS 3:   DIAGNOSIS 4:   INR RANGE: 2 5 - 3 5  INR GOAL: 3  TREATMENT START DATE:   TREATMENT END DATE:   NEXT VISIT: 12/8/2016  [facility]    VISIT RESULTS   ENCOUNTER NUMBER:   TEST LOCATION: [facility]  TEST TYPE: Outside Lab (Venipuncture)  VISIT TYPE: Phone Consult  CURRENT INR: 3 9 PROTIME: 37 3  SPECIMEN COL AND RPT DATE: 11/29/2016 4:33  PM EST    VITAL SIGNS  PULSE:  B/P:  WEIGHT:  HEIGHT:  TEMP:     CURRENT ANTICOAGULANT DOSING SCHEDULE  DOSE SIZE: 5mg    ANTICOAGULANT TYPE: WARFARIN  DOSING REGIMEN  Sun       Mon       Tues      Wed       Thurs     Fri       Sat  Total/Wk  5         5         HOLD      5         5         5         5         30    PATIENT MEDICATION INSTRUCTION: Yes  PATIENT NUTRITIONAL COUNSELING: No  PATIENT BRUISING INSTRUCTION: No      LAST EDUCATION DATE:       PREVIOUS VISIT INFORMATION  VISITDATE   INR Goal  INR   Sun     Mon     Tues    Wed     Thurs   Fri  Sat     Total/wk  11/29/2016  3         3 9   5       5       HOLD    5       5       5  5       30  11/2/2016   3         3 41  5       5       5       5       5       5  5       35  10/5/2016   3         3 4   5       5       5       5       5       5  5       35  9/7/2016    3         3 44  5       5       5       5       5       5  5       35    ADDITIONAL PREVIOUS VISIT INFORMATION  VISITDATE   PRIMARY RX               DOSE      CrCl  11/29/2016  WARFARIN                 5mg                 11/2/2016   WARFARIN 5mg                 10/5/2016   WARFARIN                 5mg                 9/7/2016    WARFARIN                 5mg                     OTHER CURRENT MEDICATIONS: WARFARIN, WARFARIN      PROGRESS NOTES:     PATIENT INSTRUCTIONS: 11/29/16, tc pt, completed antibiotics fri for strep  throat  will hold x 1 day, then resume 5 mg daily, rech 12/8  bharathi  TEST LOCATION: [facility]    Electronically signed by:  Kylah Robert on 11/29/2016 at 4:33 PM EST

## 2018-01-11 NOTE — PROGRESS NOTES
REPORT NAME: Progress Notes Report  VISIT DATE: 10/17/2017  VISIT TIME: 4:26 PM EDT  PATIENT NAME: Anurag Paez  MEDICAL RECORD NUMBER: [id number]  YOB: 1934  AGE: 80  REFERRING PHYSICIAN: Tanvi Zepeda  SUPERVISING CLINICIAN: [address] CARE PROVIDER: Debrah Prader [address]  PATIENT HOME ADDRESS: [address], [facility] [address]  PATIENT HOME PHONE: [phone]  SOCIAL SECURITY NUMBER:   DIAGNOSIS 1: Mitral Valve Replacement / 424 0  DIAGNOSIS 2:   INR RANGE: 2 5 - 3 5  INR GOAL: 3  TREATMENT START DATE:   TREATMENT END DATE:   NEXT VISIT: 11/7/2017  [facility]  VISIT RESULTS  ENCOUNTER NUMBER:   TEST LOCATION: [facility]  TEST TYPE: Outside Lab (Venipuncture)  VISIT TYPE: Phone Consult  CURRENT INR: 2 67 PROTIME: 28 8  SPECIMEN COL AND RPT DATE: 10/17/2017 4:26  PM EDT  VITAL SIGNS  PULSE:  BP: / WEIGHT:  HEIGHT:  TEMP:   CURRENT ANTICOAGULANT DOSING SCHEDULE  DOSE SIZE: 5mg    ANTICOAGULANT TYPE: WARFARIN  DOSING REGIMEN  Sun       Mon       Tues      Wed       Thurs     Fri       Sat  Total/Wk  5         5         2 5       5         5         5         5         32 5  PATIENT MEDICATION INSTRUCTION: Yes  PATIENT NUTRITIONAL COUNSELING: No  PATIENT BRUISING INSTRUCTION: Yes  LAST EDUCATION DATE:   PREVIOUS VISIT INFORMATION  VISITDATE  INRGoal INR   Sun    Mon    Tues   Wed    Thurs  Fri    Sat  Total/wk  10/17/2017  3       2 67  5      5      2 5    5      5      5      5  32 5  10/3/2017   3       3 06  5      5      2 5    5      5      5      5  32 5  9/26/2017   3       3 87  5      5      2 5    5      5      5      5  32 5  8/28/2017   3       3 32  5      5      5      5      5      5      5  35  ADDITIONAL PREVIOUS VISIT INFORMATION  VISITDATE   PRIMARY RX               DOSE      CrCl  10/17/2017  WARFARIN                 5mg  10/3/2017   WARFARIN                 5mg  9/26/2017   WARFARIN                 5mg  8/28/2017   WARFARIN                 5mg  OTHER CURRENT MEDICATIONS:  WARFARIN, WARFARIN  PROGRESS NOTES:   PATIENT INSTRUCTIONS: 10/17/17, tc pt, lm am, cont current dose, rech 3  weeks, 11/7  bharathi  TEST LOCATION: [facility], , ,   INBOUND LAB DATA:  Lab       Lab Value Col Date                 Rpt Date                 Lab  Reference Range  Electronically signed by:  Tarun Robert on 10/17/2017 4:26 PM EDT

## 2018-01-11 NOTE — PROGRESS NOTES
REPORT NAME: Progress Notes Report  VISIT DATE: 9/26/2017  VISIT TIME: 4:24 PM EDT  PATIENT NAME: Johan Gandhi  MEDICAL RECORD NUMBER: [id number]  YOB: 1934  AGE: 80  REFERRING PHYSICIAN: Manolo Patten  SUPERVISING CLINICIAN: [facility] CARE PROVIDER: Mo Nixon  PATIENT HOME ADDRESS: [address] PHONE: [phone]  SOCIAL SECURITY NUMBER:   DIAGNOSIS 1: Mitral Valve Replacement / 424 [id number]  DIAGNOSIS 2:   INR RANGE: 2 5 - 3 5  INR GOAL: 3  TREATMENT START DATE:   TREATMENT END DATE:   NEXT VISIT: 10/3/2017  [facility]  VISIT RESULTS  ENCOUNTER NUMBER:   TEST LOCATION: [facility]  TEST TYPE: Outside Lab (Venipuncture)  VISIT TYPE: Phone Consult  CURRENT INR: 3 87 PROTIME: 38 6  SPECIMEN COL AND RPT DATE: 9/26/2017 4:24  PM EDT  VITAL SIGNS  PULSE:  BP: / WEIGHT:  HEIGHT:  TEMP:   CURRENT ANTICOAGULANT DOSING SCHEDULE  DOSE SIZE: 5mg    ANTICOAGULANT TYPE: WARFARIN  DOSING REGIMEN  Sun       Mon       Tues      Wed       Thurs     Fri       Sat  Total/Wk  5         5         2 5       5         5         5         5         32 5  PATIENT MEDICATION INSTRUCTION: Yes  PATIENT NUTRITIONAL COUNSELING: No  PATIENT BRUISING INSTRUCTION: No  LAST EDUCATION DATE:   PREVIOUS VISIT INFORMATION  VISITDATE  INRGoal INR   Sun    Mon    Tues   Wed    Thurs  Fri    Sat  Total/wk  9/26/2017   3       3 87  5      5      2 5    5      5      5      5  32 5  8/28/2017   3       3 32  5      5      5      5      5      5      5  35  7/31/2017   3       3 17  5      5      5      5      5      5      5  35  7/10/2017   3       2 68  5      5      5      5      5      5      5  35  ADDITIONAL PREVIOUS VISIT INFORMATION  VISITDATE   PRIMARY RX               DOSE      CrCl  9/26/2017   WARFARIN                 5mg  8/28/2017   WARFARIN                 5mg  7/31/2017   WARFARIN                 5mg  7/10/2017   WARFARIN                 5mg  OTHER CURRENT MEDICATIONS:  WARFARIN, WARFARIN  PROGRESS NOTES:   PATIENT INSTRUCTIONS: 9/26/17, tc pt, states she was way and eating  differently  will decrease dose, 2 5 mg tues, 5 mg others, rech 1 week, 10/3  bharathi  TEST LOCATION: [facility], , ,   INBOUND LAB DATA:  Lab       Lab Value Col Date                 Rpt Date                 Lab  Reference Range  Electronically signed by:  Darryl Robert on 9/26/2017 4:24 PM EDT

## 2018-01-11 NOTE — PROGRESS NOTES
REPORT NAME: Progress Notes Report  VISIT DATE: 4/17/2017  VISIT TIME: 4:46 PM EDT  PATIENT NAME: Rama Parr  MEDICAL RECORD NUMBER: [id number]  YOB: 1934  AGE: 80  REFERRING PHYSICIAN: Leopoldo Pearce  SUPERVISING CLINICIAN: [facility] CARE PROVIDER: Queen Jeff Nixon  PATIENT HOME ADDRESS: [place], [place], [address] [place], [place] [address]  PATIENT HOME PHONE: [phone]  SOCIAL SECURITY NUMBER:   DIAGNOSIS 1: Mitral Valve Replacement / 424 0  DIAGNOSIS 2:   INR RANGE: 2 5 - 3 5  INR GOAL: 3  TREATMENT START DATE:   TREATMENT END DATE:   NEXT VISIT: 4/26/2017  [facility]  VISIT RESULTS  ENCOUNTER NUMBER:   TEST LOCATION: [facility]  TEST TYPE: Outside Lab (Venipuncture)  VISIT TYPE: Phone Consult  CURRENT INR: 3 63 PROTIME: 35 3  SPECIMEN COL AND RPT DATE: 4/17/2017 4:46  PM EDT  VITAL SIGNS  PULSE:  BP: / WEIGHT:  HEIGHT:  TEMP:   CURRENT ANTICOAGULANT DOSING SCHEDULE  DOSE SIZE: 5mg    ANTICOAGULANT TYPE: WARFARIN  DOSING REGIMEN  Sun       Mon       Tues      Wed       Thurs     Fri       Sat  Total/Wk  5         2 5       5         5         5         2 5       5         30  PATIENT MEDICATION INSTRUCTION: Yes  PATIENT NUTRITIONAL COUNSELING: No  PATIENT BRUISING INSTRUCTION: No  LAST EDUCATION DATE:   PREVIOUS VISIT INFORMATION  VISITDATE  INRGoal INR   Sun    Mon    Tues   Wed    Thurs  Fri    Sat  Total/wk  4/17/2017   3       3 63  5      2 5    5      5      5      2 5    5  30  3/30/2017   3       3 03  5      5      5      5      5      5      2 5  32 5  3/23/2017   3       2 72  5      5      5      5      5      5      2 5  32 5  3/20/2017   3       1 83  5      5      7 5    5      0      0      0  22 5  ADDITIONAL PREVIOUS VISIT INFORMATION  VISITDATE   PRIMARY RX               DOSE      CrCl  4/17/2017   WARFARIN                 5mg  3/30/2017   WARFARIN                 5mg  3/23/2017   WARFARIN                 5mg  3/20/2017   WARFARIN                 5mg  OTHER CURRENT MEDICATIONS:  WARFARIN, WARFARIN  PROGRESS NOTES:   PATIENT INSTRUCTIONS: 4/17/17, tc pt, decrease dose, 2 5 mg m/f, 5 mg  others, rech 10 days, 4/26  bharathi  TEST LOCATION: [facility], , ,   INBOUND LAB DATA:  Lab       Lab Value Col Date                 Rpt Date                 Lab  Reference Range  Electronically signed by:  Judith Robert on 4/17/2017 4:46 PM EDT

## 2018-01-11 NOTE — PROGRESS NOTES
REPORT NAME: Progress Notes Report  VISIT DATE: 7/31/2017  VISIT TIME: 4:05 PM EDT  PATIENT NAME: Robyn Brownlee  MEDICAL RECORD NUMBER: [id number]  YOB: 1934  AGE: 80  REFERRING PHYSICIAN: Renata Katz  SUPERVISING CLINICIAN: [address] CARE PROVIDER: Anat Nixon  PATIENT HOME ADDRESS: [place], [place], [facility] [address]  PATIENT HOME PHONE: [phone]  SOCIAL SECURITY NUMBER:   DIAGNOSIS 1: Mitral Valve Replacement / 424 0  DIAGNOSIS 2:   INR RANGE: 2 5 - 3 5  INR GOAL: 3  TREATMENT START DATE:   TREATMENT END DATE:   NEXT VISIT: 8/28/2017  [facility]  VISIT RESULTS  ENCOUNTER NUMBER:   TEST LOCATION: [facility]  TEST TYPE: Outside Lab (Venipuncture)  VISIT TYPE: Phone Consult  CURRENT INR: 3 17 PROTIME: 33  SPECIMEN COL AND RPT DATE: 7/31/2017 4:05 PM  EDT  VITAL SIGNS  PULSE:  BP: / WEIGHT:  HEIGHT:  TEMP:   CURRENT ANTICOAGULANT DOSING SCHEDULE  DOSE SIZE: 5mg    ANTICOAGULANT TYPE: WARFARIN  DOSING REGIMEN  Sun       Mon       Tues      Wed       Thurs     Fri       Sat  Total/Wk  5         5         5         5         5         5         5         35  PATIENT MEDICATION INSTRUCTION: Yes  PATIENT NUTRITIONAL COUNSELING: No  PATIENT BRUISING INSTRUCTION: Yes  LAST EDUCATION DATE:   PREVIOUS VISIT INFORMATION  VISITDATE  INRGoal INR   Sun    Mon    Tues   Wed    Thurs  Fri    Sat  Total/wk  7/31/2017   3       3 17  5      5      5      5      5      5      5  35  7/10/2017   3       2 68  5      5      5      5      5      5      5  35  6/28/2017   3       2 51  5      5      5      5      5      5      5  35  6/21/2017   3       1 65  5      5      5      7 5    5      5      5  37 5  ADDITIONAL PREVIOUS VISIT INFORMATION  VISITDATE   PRIMARY RX               DOSE      CrCl  7/31/2017   WARFARIN                 5mg  7/10/2017   WARFARIN                 5mg  6/28/2017   WARFARIN                 5mg  6/21/2017   WARFARIN                 5mg  OTHER CURRENT MEDICATIONS:  WARFARIN, WARFARIN  PROGRESS NOTES:   PATIENT INSTRUCTIONS: 7/31/17, tc pt, cont current dose, rech 4 weeks,  8/28  bharathi  TEST LOCATION: [facility], , ,   INBOUND LAB DATA:  Lab       Lab Value Col Date                 Rpt Date                 Lab  Reference Range  Electronically signed by:  Davida Phalen Perinotti on 7/31/2017 4:05 PM EDT

## 2018-01-11 NOTE — RESULT NOTES
Verified Results  (1) COMPREHENSIVE METABOLIC PANEL [id number] 71:17BP Vir Mace Order Number: [id number]     Test Name Result Flag Reference   GLUCOSE,RANDM 116 mg/dL     If the patient is fasting, the ADA then defines impaired fasting glucose as > 100 mg/dL and diabetes as > or equal to 123 mg/dL  SODIUM 140 mmol/L  136-145   POTASSIUM 3 6 mmol/L  3 5-5 3   CHLORIDE 102 mmol/L  100-108   CARBON DIOXIDE 32 mmol/L  21-32   ANION GAP (CALC) 6 mmol/L  4-13   BLOOD UREA NITROGEN 22 mg/dL  5-25   CREATININE 0 71 mg/dL  0 60-1 30   Standardized to IDMS reference method   CALCIUM 9 3 mg/dL  8 3-10 1   BILI, TOTAL 0 54 mg/dL  0 20-1 00   ALK PHOSPHATAS 45 U/L L    ALT (SGPT) 33 U/L  12-78   AST(SGOT) 19 U/L  5-45   ALBUMIN 3 5 g/dL  3 5-5 0   TOTAL PROTEIN 7 1 g/dL  6 4-8 2   eGFR Non-African American      >60 0 ml/min/1 73sq m   - Patient Instructions: This is a fasting blood test  Water, black tea or black coffee only after 9:00pm the night before test Drink 2 glasses of water the morning of test   National Kidney Disease Education Program recommendations are as follows:  GFR calculation is accurate only with a steady state creatinine  Chronic Kidney disease less than 60 ml/min/1 73 sq  meters  Kidney failure less than 15 ml/min/1 73 sq  meters  (1) CBC/PLT/DIFF [id number] 08:53AM [facility] Mace Order Number: [id number]     Test Name Result Flag Reference   WBC COUNT 6 86 Thousand/uL  4 31-10 16   RBC COUNT 4 54 Million/uL  3 81-5 12   HEMOGLOBIN 13 5 g/dL  11 5-15 4   HEMATOCRIT 41 6 %  34 8-46  1   MCV 92 fL  82-98   MCH 29 7 pg  26 8-34 3   MCHC 32 5 g/dL  31 4-37 4   RDW 13 9 %  11 6-15 1   MPV 11 0 fL  8 9-12 7   PLATELET COUNT 908 Thousands/uL  149-390   nRBC AUTOMATED 0 /100 WBCs     NEUTROPHILS RELATIVE PERCENT 73 %  43-75   LYMPHOCYTES RELATIVE PERCENT 19 %  14-44   MONOCYTES RELATIVE PERCENT 7 %  4-12   EOSINOPHILS RELATIVE PERCENT 1 %  0-6   BASOPHILS RELATIVE PERCENT 0 %  0-1   NEUTROPHILS ABSOLUTE COUNT 4 91 Thousands/?L  1 85-7 62   LYMPHOCYTES ABSOLUTE COUNT 1 32 Thousands/?L  0 60-4 47   MONOCYTES ABSOLUTE COUNT 0 50 Thousand/?L  0 17-1 22   EOSINOPHILS ABSOLUTE COUNT 0 08 Thousand/?L  0 00-0 61   BASOPHILS ABSOLUTE COUNT 0 03 Thousands/?L  0 00-0 10   - Patient Instructions: This bloodwork is non-fasting  Please drink two glasses of water morning of bloodwork  - Patient Instructions: This bloodwork is non-fasting  Please drink two glasses of water morning of bloodwork  (1) HEMOGLOBIN A1C [id number] 08:53AM Conferensum Order Number: [id number]     Test Name Result Flag Reference   HEMOGLOBIN A1C 5 3 %  4 2-6 3   EST  AVG  GLUCOSE 105 mg/dl       (1) LIPID PANEL FASTING W DIRECT LDL REFLEX [id number] 08:53AM Lillian Solum Order Number: [id number]     Test Name Result Flag Reference   CHOLESTEROL 142 mg/dL     LDL CHOLESTEROL CALCULATED 68 mg/dL  0-100   - Patient Instructions: This is a fasting blood test  Water, black tea or black coffee only after 9:00pm the night before test   Drink 2 glasses of water the morning of test     - Patient Instructions:  This is a fasting blood test  Water, black tea or black coffee only after 9:00pm the night before test Drink 2 glasses of water the morning of test   Triglyceride:         Normal              <150 mg/dl       Borderline High    150-199 mg/dl       High               200-499 mg/dl       Very High          >499 mg/dl  Cholesterol:         Desirable        <200 mg/dl      Borderline High  200-239 mg/dl      High             >239 mg/dl  HDL Cholesterol:        High    >59 mg/dL      Low     <41 mg/dL  LDL Cholesterol:        Optimal          <100 mg/dl        Near Optimal     100-129 mg/dl        Above Optimal          Borderline High   130-159 mg/dl          High              160-189 mg/dl          Very High        >189 mg/dl  LDL CALCULATED:    This screening LDL is a calculated result  It does not have the accuracy of the Direct Measured LDL in the monitoring of patients with hyperlipidemia and/or statin therapy  Direct Measure LDL (OSQ687) must be ordered separately in these patients  TRIGLYCERIDES 111 mg/dL  <=150   Specimen collection should occur prior to N-Acetylcysteine or Metamizole administration due to the potential for falsely depressed results  HDL,DIRECT 52 mg/dL  40-60   Specimen collection should occur prior to Metamizole administration due to the potential for falsely depressed results  (1) TSH WITH FT4 REFLEX [id number] 08:53AM [facility] Order Number: [id number]     Test Name Result Flag Reference   TSH 0 762 uIU/mL  0 358-3 740   - Patient Instructions: This is a fasting blood test  Water, black tea or black coffee only after 9:00pm the night before test Drink 2 glasses of water the morning of test   Patients undergoing fluorescein dye angiography may retain small amounts of fluorescein in the body for 48-72 hours post procedure  Samples containing fluorescein can produce falsely depressed TSH values  If the patient had this procedure,a specimen should be resubmitted post fluorescein clearance            The recommended reference ranges for TSH during pregnancy are as follows:  First trimester 0 1 to 2 5 uIU/mL  Second trimester  0 2 to 3 0 uIU/mL  Third trimester 0 3 to 3 0 uIU/m     (1) MICROALBUMIN CREATININE RATIO, RANDOM URINE 17Jan2017 08:53AM [facility] Order Number: [id number]     Test Name Result Flag Reference   MICROALBUMIN/ CREAT R 33 mg/g creatinine H 0-30   MICROALBUMIN,URINE 60 6 mg/L H 0 0-20 0   CREATININE URINE 181 0 mg/dL

## 2018-01-11 NOTE — RESULT NOTES
Message   Recorded as Task   Date: 06/30/2016 04:06 PM, Created By: Mirtha Lyons   Task Name: Follow Up   Assigned To: [id number] [address] end procedure,Team   Regarding Patient: Luna Stoner Status: Active   CommentIrl Gums - 30 Jun 2016 4:06 PM     TASK CREATED  Pt is S/P RT L4 TFESI on 6/22/16 w/Dr Lindy Peoples  No F/U scheduled  Mirtha Lyons - 05 Jul 2016 2:45 PM     TASK EDITED  Pt  is S/P RT L4 TFESI ON 6/2216 W/DR PURDY  Pt  reports 70% relief post injection with an increase in ability to perforn ADL's  No S/S of infection  Pt  will F/U as needed     Gideon Driver - 05 Jul 2016 2:59 PM     TASK REPLIED TO: Previously Assigned To Gideon Driver  follow up as needed thanks        Signatures   Electronically signed by : Kaden Wells, ; Jul 6 2016  8:19AM EST                       (Author)

## 2018-01-11 NOTE — PROGRESS NOTES
REPORT NAME: Progress Notes Report  VISIT DATE: 8/28/2017  VISIT TIME: 4:09 PM EDT  PATIENT NAME: Sarahy Randhawa  MEDICAL RECORD NUMBER: [id number]  YOB: 1934  AGE: 80  REFERRING PHYSICIAN: Filiberto De La Cruz  SUPERVISING CLINICIAN: [address] CARE PROVIDER: Judd Ceballos [facility]  PATIENT HOME ADDRESS: [address], [facility] [address]  PATIENT HOME PHONE: [phone]  SOCIAL SECURITY NUMBER:   DIAGNOSIS 1: Mitral Valve Replacement / 424 0  DIAGNOSIS 2:   INR RANGE: 2 5 - 3 5  INR GOAL: 3  TREATMENT START DATE:   TREATMENT END DATE:   NEXT VISIT: 9/25/2017  [facility]  VISIT RESULTS  ENCOUNTER NUMBER:   TEST LOCATION: [facility]  TEST TYPE: Outside Lab (Venipuncture)  VISIT TYPE: Phone Consult  CURRENT INR: 3 32 PROTIME: 34 2  SPECIMEN COL AND RPT DATE: 8/28/2017 4:09  PM EDT  VITAL SIGNS  PULSE:  BP: / WEIGHT:  HEIGHT:  TEMP:   CURRENT ANTICOAGULANT DOSING SCHEDULE  DOSE SIZE: 5mg    ANTICOAGULANT TYPE: WARFARIN  DOSING REGIMEN  Sun       Mon       Tues      Wed       Thurs     Fri       Sat  Total/Wk  5         5         5         5         5         5         5         35  PATIENT MEDICATION INSTRUCTION: Yes  PATIENT NUTRITIONAL COUNSELING: No  PATIENT BRUISING INSTRUCTION: Yes  LAST EDUCATION DATE:   PREVIOUS VISIT INFORMATION  VISITDATE  INRGoal INR   Sun    Mon    Tues   Wed    Thurs  Fri    Sat  Total/wk  8/28/2017   3       3 32  5      5      5      5      5      5      5  35  7/31/2017   3       3 17  5      5      5      5      5      5      5  35  7/10/2017   3       2 68  5      5      5      5      5      5      5  35  6/28/2017   3       2 51  5      5      5      5      5      5      5  35  ADDITIONAL PREVIOUS VISIT INFORMATION  VISITDATE   PRIMARY RX               DOSE      CrCl  8/28/2017   WARFARIN                 5mg  7/31/2017   WARFARIN                 5mg  7/10/2017   WARFARIN                 5mg  6/28/2017   WARFARIN                 5mg  OTHER CURRENT MEDICATIONS:  WARFARIN, WARFARIN  PROGRESS NOTES:   PATIENT INSTRUCTIONS: 8/28/17, tc pt, cont current dose, rech 4 weeks,  9/25  bharathi  TEST LOCATION: [facility], , ,   INBOUND LAB DATA:  Lab       Lab Value Col Date                 Rpt Date                 Lab  Reference Range  Electronically signed by:  Maria Eugenia Robert on 8/28/2017 4:09 PM EDT

## 2018-01-12 VITALS
HEART RATE: 72 BPM | SYSTOLIC BLOOD PRESSURE: 122 MMHG | DIASTOLIC BLOOD PRESSURE: 70 MMHG | WEIGHT: 164.25 LBS | HEIGHT: 64 IN | BODY MASS INDEX: 28.04 KG/M2

## 2018-01-12 VITALS
DIASTOLIC BLOOD PRESSURE: 68 MMHG | HEART RATE: 85 BPM | RESPIRATION RATE: 15 BRPM | WEIGHT: 165.5 LBS | BODY MASS INDEX: 28.41 KG/M2 | SYSTOLIC BLOOD PRESSURE: 115 MMHG

## 2018-01-12 NOTE — PROGRESS NOTES
REPORT NAME: Progress Notes Report  VISIT DATE: 5/10/2017  VISIT TIME: 8:46 AM EDT  PATIENT NAME: Venessa Bullock  MEDICAL RECORD NUMBER: [id number]  YOB: 1934  AGE: 80  REFERRING PHYSICIAN: Wendy Lau  SUPERVISING CLINICIAN: [address] CARE PROVIDER: Kylah Nixon  PATIENT HOME ADDRESS: [address], [facility] [address]  PATIENT HOME PHONE: [phone]  SOCIAL SECURITY NUMBER:   DIAGNOSIS 1: Mitral Valve Replacement / 424 0  DIAGNOSIS 2:   INR RANGE: 2 5 - 3 5  INR GOAL: 3  TREATMENT START DATE:   TREATMENT END DATE:   NEXT VISIT: 5/24/2017  [facility]  VISIT RESULTS  ENCOUNTER NUMBER:   TEST LOCATION: [facility]  TEST TYPE: Outside Lab (Venipuncture)  VISIT TYPE: Phone Consult  CURRENT INR: 2 74 PROTIME: 29 4  SPECIMEN COL AND RPT DATE: 5/10/2017 8:46  AM EDT  VITAL SIGNS  PULSE:  BP: / WEIGHT:  HEIGHT:  TEMP:   CURRENT ANTICOAGULANT DOSING SCHEDULE  DOSE SIZE: 5mg    ANTICOAGULANT TYPE: WARFARIN  DOSING REGIMEN  Sun       Mon       Tues      Wed       Thurs     Fri       Sat  Total/Wk  5         5         5         5         5         5         5         35  PATIENT MEDICATION INSTRUCTION: Yes  PATIENT NUTRITIONAL COUNSELING: No  PATIENT BRUISING INSTRUCTION: No  LAST EDUCATION DATE:   PREVIOUS VISIT INFORMATION  VISITDATE  INRGoal INR   Sun    Mon    Tues   Wed    Thurs  Fri    Sat  Total/wk  5/10/2017   3       2 74  5      5      5      5      5      5      5  35  5/3/2017    3       1 78  5      5      5      7 5    5      5      5  37 5  4/26/2017   3       1 69  5      2 5    5      7 5    5      5      5  35  5      2 5    5      5      5      5      5  32 5  4/17/2017   3       3 63  5      2 5    5      5      5      2 5    5  30  ADDITIONAL PREVIOUS VISIT INFORMATION  VISITDATE   PRIMARY RX               DOSE      CrCl  5/10/2017   WARFARIN                 5mg  5/3/2017    WARFARIN                 5mg  4/26/2017   WARFARIN                 5mg  4/17/2017 WARFARIN                 5mg  OTHER CURRENT MEDICATIONS:  WARFARIN, WARFARIN  PROGRESS NOTES:   PATIENT INSTRUCTIONS: 5/11/17, tc pt, cont current dose, 5 mg daily, rech 2  weeks, 5/24  bharathi  TEST LOCATION: [facility], , ,   INBOUND LAB DATA:  Lab       Lab Value Col Date                 Rpt Date                 Lab  Reference Range  Electronically signed by:  Tarun Robert on 5/11/2017 8:46 AM EDT

## 2018-01-12 NOTE — PROGRESS NOTES
REPORT NAME: Patient Visit Summary Report   VISIT DATE: 12/30/2016  VISIT TIME: 2:01 PM EST  PATIENT NAME: Valentino Hatch  MEDICAL RECORD NUMBER: [id number]  SOCIAL SECURITY NUMBER:   YOB: 1934  AGE: 80  REFERRING PHYSICIAN: Cristóbal Mcclure  SUPERVISING CLINICIAN: [address] CARE PROFESSIONAL: Maria Eugenia Nixon  PATIENT HOME ADDRESS: [place], [place], [address] [place], [place] [address]  PATIENT HOME PHONE: [phone]  DIAGNOSIS 1: Mitral Valve Replacement / 424 0  DIAGNOSIS 2:   DIAGNOSIS 3:   DIAGNOSIS 4:   INR RANGE: 2 5 - 3 5  INR GOAL: 3  TREATMENT START DATE:   TREATMENT END DATE:   NEXT VISIT: 1/17/2017  [facility]    VISIT RESULTS   ENCOUNTER NUMBER:   TEST LOCATION: [facility]  TEST TYPE: Outside Lab (Venipuncture)  VISIT TYPE: Phone Consult  CURRENT INR: 2 89 PROTIME: 29 7  SPECIMEN COL AND RPT DATE: 12/30/2016 2:01  PM EST    VITAL SIGNS  PULSE:  B/P:  WEIGHT:  HEIGHT:  TEMP:     CURRENT ANTICOAGULANT DOSING SCHEDULE  DOSE SIZE: 5mg    ANTICOAGULANT TYPE: WARFARIN  DOSING REGIMEN  Sun       Mon       Tues      Wed       Thurs     Fri       Sat  Total/Wk  5         5         5         5         5         5         5         35    PATIENT MEDICATION INSTRUCTION: Yes  PATIENT NUTRITIONAL COUNSELING: No  PATIENT BRUISING INSTRUCTION: No      LAST EDUCATION DATE:       PREVIOUS VISIT INFORMATION  VISITDATE   INR Goal  INR   Sun     Mon     Tues    Wed     Thurs   Fri  Sat     Total/wk  12/30/2016  3         2 89  5       5       5       5       5       5  5       35  12/23/2016  3         2 56  5       5       5       5       5       5  5       35  12/21/2016  3         5 79  0       0       0       HOLD    HOLD    0  0       0  12/8/2016   3         2 73  5       5       5       5       5       5  5       35    ADDITIONAL PREVIOUS VISIT INFORMATION  VISITDATE   PRIMARY RX               DOSE      CrCl  12/30/2016  WARFARIN                 5mg                 12/23/2016  WARFARIN 5mg                 12/21/2016  WARFARIN                 5mg                 12/8/2016   WARFARIN                 5mg                     OTHER CURRENT MEDICATIONS: WARFARIN, WARFARIN      PROGRESS NOTES:     PATIENT INSTRUCTIONS: 12/30/16, tc pt, cont 5 mg daily, rech 2 -3 week,  1/17  bharathi  TEST LOCATION: [facility]    Electronically signed by:  Kylah Robert on 12/30/2016 at 2:01 PM EST

## 2018-01-12 NOTE — PROGRESS NOTES
REPORT NAME: Patient Visit Summary Report   VISIT DATE: 9/7/2016  VISIT TIME: 4:05 PM EDT  PATIENT NAME: Lakeshia Naik  MEDICAL RECORD NUMBER: [id number]  SOCIAL SECURITY NUMBER:   YOB: 1934  AGE: 80  REFERRING PHYSICIAN: Ramiro Swain  SUPERVISING CLINICIAN: [address] CARE PROFESSIONAL: Yamila AVALOS [facility]  PATIENT HOME ADDRESS: [address], [address] [place], [place] [address]  PATIENT HOME PHONE: [phone]  DIAGNOSIS 1: Mitral Valve Replacement / 424 0  DIAGNOSIS 2:   DIAGNOSIS 3:   DIAGNOSIS 4:   INR RANGE: 2 5 - 3 5  INR GOAL: 3  TREATMENT START DATE:   TREATMENT END DATE:   NEXT VISIT: 10/5/2016  [facility]    VISIT RESULTS   ENCOUNTER NUMBER:   TEST LOCATION: [facility]  TEST TYPE: Outside Lab (Venipuncture)  VISIT TYPE: Phone Consult  CURRENT INR: 3 44 PROTIME: 33 9  SPECIMEN COL AND RPT DATE: 9/7/2016 4:05  PM EDT    VITAL SIGNS  PULSE:  B/P:  WEIGHT:  HEIGHT:  TEMP:     CURRENT ANTICOAGULANT DOSING SCHEDULE  DOSE SIZE: 5mg    ANTICOAGULANT TYPE: WARFARIN  DOSING REGIMEN  Sun       Mon       Tues      Wed       Thurs     Fri       Sat  Total/Wk  5         5         5         5         5         5         5         35    PATIENT MEDICATION INSTRUCTION: Yes  PATIENT NUTRITIONAL COUNSELING: No  PATIENT BRUISING INSTRUCTION: Yes      LAST EDUCATION DATE:       PREVIOUS VISIT INFORMATION  VISITDATE   INR Goal  INR   Sun     Mon Tues Wed Thurs Fri  Sat     Total/wk  9/7/2016    3         3 44  5       5       5       5       5       5  5       35  8/9/2016    3         3 08  5       5       5       5       5       5  5       35  7/26/2016   3         3 28  5       5       5       5       5       5  5       35  7/18/2016   3         3 4   5       5       5       5       5       5  5       35    ADDITIONAL PREVIOUS VISIT INFORMATION  VISITDATE   PRIMARY RX               DOSE      CrCl  9/7/2016    WARFARIN                 5mg                 8/9/2016    WARFARIN 5mg                 7/26/2016   WARFARIN                 5mg                 7/18/2016   WARFARIN                 5mg                     OTHER CURRENT MEDICATIONS: WARFARIN, WARFARIN      PROGRESS NOTES:     PATIENT INSTRUCTIONS: 9/7/16, tc pt, cont current dose, rech 4 weeks, 10/5   bharathi  TEST LOCATION: [facility]    Electronically signed by:  Lubna Robert on 9/7/2016 at 4:05 PM EDT

## 2018-01-12 NOTE — PROGRESS NOTES
REPORT NAME: Patient Visit Summary Report   VISIT DATE: 10/5/2016  VISIT TIME: 3:56 PM EDT  PATIENT NAME: Teri Fragoso  MEDICAL RECORD NUMBER: [id number]  SOCIAL SECURITY NUMBER:   YOB: 1934  AGE: 80  REFERRING PHYSICIAN: Daryl Whalen  SUPERVISING CLINICIAN: [address] CARE PROFESSIONAL: Amos Nixon  PATIENT HOME ADDRESS: [address]  PATIENT HOME PHONE: [phone]  DIAGNOSIS 1: Mitral Valve Replacement / 424 0  DIAGNOSIS 2:   DIAGNOSIS 3:   DIAGNOSIS 4:   INR RANGE: 2 5 - 3 5  INR GOAL: 3  TREATMENT START DATE:   TREATMENT END DATE:   NEXT VISIT: 11/2/2016  [facility]    VISIT RESULTS   ENCOUNTER NUMBER:   TEST LOCATION: [facility]  TEST TYPE: Outside Lab (Venipuncture)  VISIT TYPE: Phone Consult  CURRENT INR: 3 4 PROTIME: 33 6  SPECIMEN COL AND RPT DATE: 10/5/2016 3:56  PM EDT    VITAL SIGNS  PULSE:  B/P:  WEIGHT:  HEIGHT:  TEMP:     CURRENT ANTICOAGULANT DOSING SCHEDULE  DOSE SIZE: 5mg    ANTICOAGULANT TYPE: WARFARIN  DOSING REGIMEN  Sun       Mon       Tues      Wed       Thurs     Fri       Sat  Total/Wk  5         5         5         5         5         5         5         35    PATIENT MEDICATION INSTRUCTION: Yes  PATIENT NUTRITIONAL COUNSELING: No  PATIENT BRUISING INSTRUCTION: Yes      LAST EDUCATION DATE:       PREVIOUS VISIT INFORMATION  VISITDATE   INR Goal  INR   Sun     Mon     Tues    Wed     Thurs   Fri  Sat     Total/wk  10/5/2016   3         3 4   5       5       5       5       5       5  5       35  9/7/2016    3         3 44  5       5       5       5       5       5  5       35  8/9/2016    3         3 08  5       5       5       5       5       5  5       35  7/26/2016   3         3 28  5       5       5       5       5       5  5       35    ADDITIONAL PREVIOUS VISIT INFORMATION  VISITDATE   PRIMARY RX               DOSE      CrCl  10/5/2016   WARFARIN                 5mg                 9/7/2016    WARFARIN 5mg                 8/9/2016    WARFARIN                 5mg                 7/26/2016   WARFARIN                 5mg                     OTHER CURRENT MEDICATIONS: WARFARIN, WARFARIN      PROGRESS NOTES:     PATIENT INSTRUCTIONS: 10/5/16, tc pt, cont current dose, rech 4 weeks,  11/2  bharathi  TEST LOCATION: [facility]    Electronically signed by:  Boni Robert on 10/5/2016 at 3:56 PM EDT

## 2018-01-12 NOTE — PROGRESS NOTES
REPORT NAME: Progress Notes Report  VISIT DATE: 3/16/2017  VISIT TIME: 4:23 PM EDT  PATIENT NAME: Mike Maharaj  MEDICAL RECORD NUMBER: [id number]  YOB: 1934  AGE: 80  REFERRING PHYSICIAN: Anthony Cm  SUPERVISING CLINICIAN: [facility] [facility] CARE PROVIDER: Davida Phalen [address]  PATIENT HOME ADDRESS: [address], [facility] [address]  PATIENT HOME PHONE: [phone]  SOCIAL SECURITY NUMBER:   DIAGNOSIS 1: Mitral Valve Replacement / 424 0  DIAGNOSIS 2:   INR RANGE: 2 5 - 3 5  INR GOAL: 3  TREATMENT START DATE:   TREATMENT END DATE:   NEXT VISIT: 3/20/2017  [facility]  VISIT RESULTS  ENCOUNTER NUMBER:   TEST LOCATION: [facility]  TEST TYPE: Outside Lab (Venipuncture)  VISIT TYPE: Phone Consult  CURRENT INR: 1 22 PROTIME: 15 5  SPECIMEN COL AND RPT DATE: 3/16/2017 4:23  PM EDT  VITAL SIGNS  PULSE:  BP: / WEIGHT:  HEIGHT:  TEMP:   CURRENT ANTICOAGULANT DOSING SCHEDULE  DOSE SIZE: 5mg    ANTICOAGULANT TYPE: WARFARIN  DOSING REGIMEN  Sun       Mon       Tues      Wed       Thurs     Fri       Sat  Total/Wk  0         0         0         0         HOLD      0         0         0  PATIENT MEDICATION INSTRUCTION: Yes  PATIENT NUTRITIONAL COUNSELING: No  PATIENT BRUISING INSTRUCTION: No  LAST EDUCATION DATE:   PREVIOUS VISIT INFORMATION  VISITDATE  INRGoal INR   Sun    Mon    Tues   Wed    Thurs  Fri    Sat  Total/wk  3/16/2017   3       1 22  0      0      0      0      HOLD   0      0  0  3/9/2017    3       2 63  5      5      5      5      5      5      2 5  32 5  2/24/2017   3       2 71  5      5      5      5      5      5      2 5  32 5  2/17/2017   3       1 91  5      5      5      5      5      5      2 5  32 5  ADDITIONAL PREVIOUS VISIT INFORMATION  VISITDATE   PRIMARY RX               DOSE      CrCl  3/16/2017   WARFARIN                 5mg  3/9/2017    WARFARIN                 5mg  2/24/2017   WARFARIN                 5mg  2/17/2017   WARFARIN                 5mg  OTHER CURRENT MEDICATIONS:  WARFARIN, WARFARIN  PROGRESS NOTES:   PATIENT INSTRUCTIONS: 3/16/17, tc pt, pt planning surgery 3/17  currently  being bridged with lovenox  next inr due 3/20  bharathi  TEST LOCATION: [facility], , ,   INBOUND LAB DATA:  Lab       Lab Value Col Date                 Rpt Date                 Lab  Reference Range  Electronically signed by:  Maria Eugenia Robert on 3/16/2017 4:23 PM EDT

## 2018-01-12 NOTE — PROGRESS NOTES
REPORT NAME: Patient Visit Summary Report   VISIT DATE: 2/24/2017  VISIT TIME: 4:03 PM EST  PATIENT NAME: Anurag Paez  MEDICAL RECORD NUMBER: [id number]  SOCIAL SECURITY NUMBER:   YOB: 1934  AGE: 80  REFERRING PHYSICIAN: Tanvi Zepeda  SUPERVISING CLINICIAN: [address] CARE PROFESSIONAL: Debrah Prader [address]  PATIENT HOME ADDRESS: [address]  PATIENT HOME PHONE: [phone]  DIAGNOSIS 1: Mitral Valve Replacement / 424 0  DIAGNOSIS 2:   DIAGNOSIS 3:   DIAGNOSIS 4:   INR RANGE: 2 5 - 3 5  INR GOAL: 3  TREATMENT START DATE:   TREATMENT END DATE:   NEXT VISIT: 3/9/2017  [facility]    VISIT RESULTS   ENCOUNTER NUMBER:   TEST LOCATION: [facility]  TEST TYPE: Outside Lab (Venipuncture)  VISIT TYPE: Phone Consult  CURRENT INR: 2 71 PROTIME: 28 3  SPECIMEN COL AND RPT DATE: 2/24/2017 4:03  PM EST    VITAL SIGNS  PULSE:  B/P:  WEIGHT:  HEIGHT:  TEMP:     CURRENT ANTICOAGULANT DOSING SCHEDULE  DOSE SIZE: 5mg    ANTICOAGULANT TYPE: WARFARIN  DOSING REGIMEN  Sun       Mon Tues Wed Thurs Fri       Sat  Total/Wk  5         5         5         5         5         5         2 5       32 5    PATIENT MEDICATION INSTRUCTION: Yes  PATIENT NUTRITIONAL COUNSELING: No  PATIENT BRUISING INSTRUCTION: No      LAST EDUCATION DATE:       PREVIOUS VISIT INFORMATION  VISITDATE   INR Goal  INR   Sun     Mon Tues Wed Thurs Fri  Sat     Total/wk  2/24/2017   3         2 71  5       5       5       5       5       5  2 5     32 5  2/17/2017   3         1 91  5       5       5       5       5       5  2 5     32 5  2/14/2017   3         1 47  5       5       7 5     5       5       2 5  2 5     32 5  2/10/2017   3         2 87  5       5       0       0       0       2 5  2 5     15    ADDITIONAL PREVIOUS VISIT INFORMATION  VISITDATE   PRIMARY RX               DOSE      CrCl  2/24/2017   WARFARIN                 5mg                 2/17/2017   WARFARIN 5mg                 2/14/2017   WARFARIN                 5mg                 2/10/2017   WARFARIN                 5mg                     OTHER CURRENT MEDICATIONS: WARFARIN, WARFARIN      PROGRESS NOTES:     PATIENT INSTRUCTIONS: 2/24/17, tc pt, cont current dose, rech thur, 3/9  at  which time, inr needs to be reviewed with dr Georganna Riedel for preop hold on  warfarin and lovenox bridging for surgery on 3/17  bharathi  TEST LOCATION: [facility]    Electronically signed by:  Romana Pih Perinotti on 2/24/2017 at 4:03 PM EST

## 2018-01-12 NOTE — MISCELLANEOUS
Message   Recorded as Task   Date: 03/29/2016 04:06 PM, Created By: Christine Guzman   Task Name: Miscellaneous   Assigned To: [id number] [address] clinical,Team   Regarding Patient: Sohan Renee, Status: Active   Comment:    Christine Guzman - 29 Mar 2016 4:06 PM     TASK CREATED  Faxed request for hold [facility]) to pcp's office, pending response   Mk Yang - 29 Mar 2016 4:10 PM     TASK REASSIGNED: Previously Assigned To Grady Kunz - 30 Mar 2016 10:04 AM     TASK EDITED  Please disregard as Dr Yobany Diaz has decided that doing procedure is not an option  Active Problems    1  Analgesic use (V58 69) (Z79 899)   2  Anticoagulated by anticoagulation treatment (V58 61) (Z79 01)   3  Arthritis (716 90) (M19 90)   4  Atrial fibrillation (427 31) (I48 91)   5  Backache (724 5) (M54 9)   6  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)   7  Burning sensation (782 0) (R20 8)   8  Coagulation defect (286 9) (D68 9)   9  Current use of long term anticoagulation (V58 61) (Z79 01)   10  Disc degeneration, 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)   11  Encounter for Medicare annual wellness exam (V70 0) (Z00 00)   12  Encounter for screening colonoscopy (V76 51) (Z12 11)   13  Encounter for screening mammogram for malignant neoplasm of breast (V76 12)    (Z12 31)   14  Hematuria (599 70) (R31 9)   15  Hyperlipidemia (272 4) (E78 5)   16  Hypertension (401 9) (I10)   17  Limb pain (729 5) (M79 609)   18  Limb Weakness (Paresis) (728 87)   19  Low back pain (724 2) (M54 5)   20  Lumbar radiculopathy (724 4) (M54 16)   21  Lumbar stenosis with neurogenic claudication (724 03) (M48 06)   22  History of Mitral Valve Replacement   23  Myofascial pain syndrome (729 1) (M79 1)   24  Osteopenia (733 90) (M85 80)   25  Pain syndrome, chronic (338 4) (G89 4)   26  Sacroiliitis (720 2) (M46 1)   27  Senile purpura (287 2) (D69 2)   28  Spinal stenosis (724 00) (M48 00)   29   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 myelopathy or radiculopathy (721 3)    (M47 817)   30  Tendonitis Of The Left Shoulder (726 10)   31  Type 2 diabetes mellitus (250 00) (E11 9)   32  Visit for screening mammogram (V76 12) (Z12 31)   33  Vitamin D deficiency (268 9) (E55 9)    Current Meds   1  Atenolol 50 MG Oral Tablet; take one tablet by mouth every day; Therapy: 52MVZ2161 to (3990 09 62 88)  Requested for: 60XWH1613; Last   Rx:21Mqw9860 Ordered   2  Gabapentin 300 MG Oral Capsule; TAKE 2 CAPSULES BY MOUTH 3 TIMES A DAY; Therapy: 64KRB8471 to (Evaluate:48Jkc7832)  Requested for: 40ANL4648; Last   Rx:01Kgd0707 Ordered   3  Losartan Potassium-HCTZ 100-12 5 MG Oral Tablet; TAKE 1 TABLET DAILY; Therapy: 82YOS7650 to (DMJYWVPI:43SFS2244)  Requested for: 01DDG0710; Last   Rx:35Ork0507 Ordered   4  MetFORMIN HCl  MG Oral Tablet Extended Release 24 Hour; take one tablet by   mouth every day; Therapy: 98HBK4180 to (MUNPIIRO:97AKE4157)  Requested for: 36SNX5177; Last   Rx:80Kbx0717 Ordered   5  Pravastatin Sodium 40 MG Oral Tablet; Take 1 tablet daily; Therapy: 71GUK4347 to (Evaluate:23Rfp9998)  Requested for: 18EQB1781; Last   Rx:20Fyy0191 Ordered   6  TraMADol HCl - 50 MG Oral Tablet; Take 1 tablet 3 times daily as needed; Therapy: 74GVA0656 to (0481 38 27 75)  Requested for: 63UOZ8167; Last   Rx:55Owy7311 Ordered   7  Warfarin Sodium 5 MG Oral Tablet; TAKE 1 TABLET DAILY AS DIRECTED; Therapy: 56PRW1667 to (6940 28 62 88)  Requested for: 03VXB0372; Last   Rx:82Cxj0648 Ordered    Allergies    1   Lisinopril TABS    Signatures   Electronically signed by : Allan Johnson, ; Mar 30 2016 10:04AM EST                       (Author)

## 2018-01-12 NOTE — PROGRESS NOTES
REPORT NAME: Progress Notes Report  VISIT DATE: 5/24/2017  VISIT TIME: 11:29 AM EDT  PATIENT NAME: Candance Deed  MEDICAL RECORD NUMBER: [id number]  YOB: 1934  AGE: 80  REFERRING PHYSICIAN: Liberty Hodgson  SUPERVISING CLINICIAN: [address] CARE PROVIDER: Darryl Nixon  PATIENT HOME ADDRESS: [address], [facility] [address]  PATIENT HOME PHONE: [phone]  SOCIAL SECURITY NUMBER:   DIAGNOSIS 1: Mitral Valve Replacement / 424 0  DIAGNOSIS 2:   INR RANGE: 2 5 - 3 5  INR GOAL: 3  TREATMENT START DATE:   TREATMENT END DATE:   NEXT VISIT: 6/12/2017  [facility]  VISIT RESULTS  ENCOUNTER NUMBER:   TEST LOCATION: [facility]  TEST TYPE: Outside Lab (Venipuncture)  VISIT TYPE: Phone Consult  CURRENT INR: 2 24 PROTIME: 25  SPECIMEN COL AND RPT DATE: 5/24/2017 11:29  AM EDT  VITAL SIGNS  PULSE:  BP: / WEIGHT:  HEIGHT:  TEMP:   CURRENT ANTICOAGULANT DOSING SCHEDULE  DOSE SIZE: 5mg    ANTICOAGULANT TYPE: WARFARIN  DOSING REGIMEN  Sun       Mon Tues Wed Thurs Fri       Sat  Total/Wk  5         5         5         5         5         5         5         35  PATIENT MEDICATION INSTRUCTION: Yes  PATIENT NUTRITIONAL COUNSELING: No  PATIENT BRUISING INSTRUCTION: No  LAST EDUCATION DATE:   PREVIOUS VISIT INFORMATION  VISITDATE  INRGoal INR   Sun    Mon    Tues   Wed    Thurs  Fri    Sat  Total/wk  5/24/2017   3       2 24  5      5      5      5      5      5      5  35  5/10/2017   3       2 74  5      5      5      5      5      5      5  35  5/3/2017    3       1 78  5      5      5      7 5    5      5      5  37 5  4/26/2017   3       1 69  5      2 5    5      7 5    5      5      5  35  5      2 5    5      5      5      5      5  32 5  ADDITIONAL PREVIOUS VISIT INFORMATION  VISITDATE   PRIMARY RX               DOSE      CrCl  5/24/2017   WARFARIN                 5mg  5/10/2017   WARFARIN                 5mg  5/3/2017    WARFARIN                 5mg  4/26/2017 WARFARIN                 5mg  OTHER CURRENT MEDICATIONS:  WARFARIN, WARFARIN  PROGRESS NOTES:   PATIENT INSTRUCTIONS: 5/25/17, tc pt, denies any missed doses or changes  will increase dose, 7 5 mg th, 5 mg others, rech 2 weeks, 6/12  pt going to  [place] for vacation  bharathi  TEST LOCATION: [facility],  ,   INBOUND LAB DATA:  Lab       Lab Value Col Date                 Rpt Date                 Lab  Reference Range  Electronically signed by:  Christal Robert on 5/25/2017 11:29 AM EDT

## 2018-01-13 VITALS
RESPIRATION RATE: 12 BRPM | SYSTOLIC BLOOD PRESSURE: 108 MMHG | HEIGHT: 64 IN | DIASTOLIC BLOOD PRESSURE: 58 MMHG | BODY MASS INDEX: 28.17 KG/M2 | WEIGHT: 165 LBS | HEART RATE: 68 BPM

## 2018-01-13 VITALS
HEIGHT: 64 IN | DIASTOLIC BLOOD PRESSURE: 62 MMHG | WEIGHT: 165.13 LBS | BODY MASS INDEX: 28.19 KG/M2 | RESPIRATION RATE: 16 BRPM | SYSTOLIC BLOOD PRESSURE: 110 MMHG | HEART RATE: 64 BPM

## 2018-01-13 VITALS
RESPIRATION RATE: 12 BRPM | SYSTOLIC BLOOD PRESSURE: 102 MMHG | HEART RATE: 72 BPM | BODY MASS INDEX: 28.17 KG/M2 | DIASTOLIC BLOOD PRESSURE: 54 MMHG | WEIGHT: 165 LBS | HEIGHT: 64 IN

## 2018-01-13 NOTE — PROGRESS NOTES
REPORT NAME: Patient Visit Summary Report   VISIT DATE: 5/23/2016  VISIT TIME: 4:20 PM EDT  PATIENT NAME: Elkin Cody  MEDICAL RECORD NUMBER: [id number]  SOCIAL SECURITY NUMBER:   YOB: 1934  AGE: 80  REFERRING PHYSICIAN: Bandar Ellison  SUPERVISING CLINICIAN: [address] CARE PROFESSIONAL: Camila Nixon  PATIENT HOME ADDRESS: [address]  PATIENT HOME PHONE: [phone]  DIAGNOSIS 1: Mitral Valve Replacement / 424 0  DIAGNOSIS 2:   DIAGNOSIS 3:   DIAGNOSIS 4:   INR RANGE: 2 5 - 3 5  INR GOAL: 3  TREATMENT START DATE:   TREATMENT END DATE:   NEXT VISIT: 6/20/2016  [facility]    VISIT RESULTS   ENCOUNTER NUMBER:   TEST LOCATION: [facility]  TEST TYPE: Outside Lab (Venipuncture)  VISIT TYPE: Phone Consult  CURRENT INR: 2 84 PROTIME: 28 3  SPECIMEN COL AND RPT DATE: 5/23/2016 4:20  PM EDT    VITAL SIGNS  PULSE:  B/P:  WEIGHT:  HEIGHT:  TEMP:     CURRENT ANTICOAGULANT DOSING SCHEDULE  DOSE SIZE: 5mg    ANTICOAGULANT TYPE: WARFARIN  DOSING REGIMEN  Sun       Mon       Tues      Wed       Thurs     Fri       Sat  Total/Wk  2 5       5         5         5         5         5         5         32 5    PATIENT MEDICATION INSTRUCTION: Yes  PATIENT NUTRITIONAL COUNSELING: No  PATIENT BRUISING INSTRUCTION: No      LAST EDUCATION DATE:       PREVIOUS VISIT INFORMATION  VISITDATE   INR Goal  INR   Sun     Mon     Tues    Wed     Thurs   Fri  Sat     Total/wk  5/23/2016   3         2 84  2 5     5       5       5       5       5  5       32 5  4/27/2016   3         2 6   2 5     5       5       5       5       5  5       32 5  4/13/2016   3         2 3   2 5     5       5       5       5       5  5       32 5  3/16/2016   3         2 6   2 5     5       5       5       5       5  5       32 5    ADDITIONAL PREVIOUS VISIT INFORMATION  VISITDATE   PRIMARY RX               DOSE      CrCl  5/23/2016   WARFARIN                 5mg 4/27/2016   WARFARIN                 5mg                 4/13/2016   WARFARIN                 5mg                 3/16/2016   WARFARIN                 5mg                     OTHER CURRENT MEDICATIONS: WARFARIN, WARFARIN      PROGRESS NOTES:     PATIENT INSTRUCTIONS: 5/23/16, tc pt, cont current dose, rech 4 weeks,  6/20  may need back epidural injections in future  bharathi  TEST LOCATION: [facility]    Electronically signed by:  Davida Phalen Perinotti on 5/23/2016 at 4:20 PM EDT

## 2018-01-13 NOTE — MISCELLANEOUS
Message  Reviewed high INR results, pt asked to hold warfain x 2 1  days (wed-thurs), 1  will check INR Friday  1        1 Amended By: Radha Fragoso;  Feb 08 2017 5:12 PM EST    Signatures   Electronically signed by : Manolo Angeles DO; Feb 8 2017  5:12PM EST                       (Author)

## 2018-01-13 NOTE — PROGRESS NOTES
REPORT NAME: Patient Visit Summary Report   VISIT DATE: 7/12/2016  VISIT TIME: 1:33 PM EDT  PATIENT NAME: Damaris Car  MEDICAL RECORD NUMBER: [id number]  SOCIAL SECURITY NUMBER:   YOB: 1934  AGE: 80  REFERRING PHYSICIAN: Annette Hooks  SUPERVISING CLINICIAN: [address] CARE PROFESSIONAL: Melia Carter  PATIENT HOME ADDRESS: [address], [address] [place], [place] [address]  PATIENT HOME PHONE: [phone]  DIAGNOSIS 1: Mitral Valve Replacement / 424 0  DIAGNOSIS 2:   DIAGNOSIS 3:   DIAGNOSIS 4:   INR RANGE: 2 5 - 3 5  INR GOAL: 3  TREATMENT START DATE:   TREATMENT END DATE:   NEXT VISIT: 7/18/2016  [facility]    VISIT RESULTS   ENCOUNTER NUMBER:   TEST LOCATION: [facility]  TEST TYPE: Outside Lab (Venipuncture)  VISIT TYPE:   CURRENT INR: 1 98 PROTIME:   SPECIMEN COL AND RPT DATE: 7/12/2016 1:33 PM  EDT    VITAL SIGNS  PULSE:  B/P:  WEIGHT:  HEIGHT:  TEMP:     CURRENT ANTICOAGULANT DOSING SCHEDULE  DOSE SIZE: 5mg    ANTICOAGULANT TYPE: WARFARIN  DOSING REGIMEN  Sun       Mon       Tues      Wed       Thurs     Fri       Sat  Total/Wk  5         5         10        5         5         5         5         40    PATIENT MEDICATION INSTRUCTION: No  PATIENT NUTRITIONAL COUNSELING: No  PATIENT BRUISING INSTRUCTION: No      LAST EDUCATION DATE:       PREVIOUS VISIT INFORMATION  VISITDATE   INR Goal  INR   Sun     Mon     Tues    Wed     Thurs   Fri  Sat     Total/wk  7/12/2016   3         1 98  5       5       10      5       5       5  5       40  7/1/2016    3         2 5   2 5     5       5       5       5       5  5       32 5  6/28/2016   3         1 36  0       0       0       10      10      0  0       20  5/23/2016   3         2 84  2 5     5       5       5       5       5  5       32 5    ADDITIONAL PREVIOUS VISIT INFORMATION  VISITDATE   PRIMARY RX               DOSE      CrCl  7/12/2016   WARFARIN                 5mg                 7/1/2016    WARFARIN 5mg                 6/28/2016   WARFARIN                 5mg                 5/23/2016   WARFARIN                 5mg                     OTHER CURRENT MEDICATIONS: WARFARIN, WARFARIN      PROGRESS NOTES: pt called denies any changes or  missed doses    PATIENT INSTRUCTIONS: 10 mg today than 5 mg daily with recheck 1 week    TEST LOCATION: [address]    Electronically signed by: Wali Acuña on 7/12/2016 at 1:33 PM EDT

## 2018-01-13 NOTE — MISCELLANEOUS
Message  Called pt regarding upcoming umbilical hernia surgery and clearance  Pt states she is feeling well, w/ no symptoms such as SOB, CP, palpitations, LH, edema  Pt to check INR ~ 7-8 days before surgery and then will give appropriated instructions regarding holding warfarin and undergoing lovenox bridging perioperatively  Pt understands, will call if any clinical change before surgery  For now, suspect low-int  CV risk without need for further testing at present  Normal LVEF 2016, no ischemic evaluation recommended at present        Signatures   Electronically signed by : Kenzie Richardson, DO; Feb 27 2017 11:10AM EST                       (Author)

## 2018-01-13 NOTE — RESULT NOTES
Verified Results  * MAMMO SCREENING BILATERAL W CAD [id number] 08:41AM Dhiraj Orr Order Number: [id number]    - Patient Instructions: To schedule this appointment, please contact Central Scheduling at [phone]  Do not wear any perfume, powder, lotion or deodorant on breast or underarm area  Please bring your doctors order, referral (if needed) and insurance information with you on the day of the test  Failure to bring this information may result in this test being rescheduled  Arrive 15 minutes prior to your appointment time to register  On the day of your test, please bring any prior mammogram or breast studies with you that were not performed at a [facility]  Failure to bring prior exams may result in your test needing to be rescheduled  Test Name Result Flag Reference   MAMMO SCREENING BILATERAL W CAD (Report)     Patient History:   Patient is postmenopausal    Family history of unknown cancer in father at age 48 or over  Patient has never smoked  Patient's BMI is 27 5  Reason for exam: screening (asymptomatic)  Mammo Screening Bilateral W CAD: January 25, 2017 - Check In #:    [de-identified]   Bilateral MLO view(s) were taken  CC view(s) were taken of the    right breast      Technologist: ZAK Salcedo (R)(M)   Prior study comparison: December 7, 2015, bilateral digital    screening mammogram performed at [facility]  December 5, 2014, bilateral digital screening    mammogram performed at [facility]  December 4, 2013, bilateral digital screening mammogram    [facility] [address]  November 30, 2012, bilateral digital screening mammogram    [facility] [address]  November 15, 2011, bilateral digital screening mammogram    [facility]       There are scattered fibroglandular densities  No dominant soft tissue mass, architectural distortion or    suspicious calcifications are noted in either breast   The skin    and nipple structures are within normal limits  Scattered benign   appearing calcifications are noted  No evidence of malignancy  No significant changes when compared with prior studies  ASSESSMENT: BiRad:2 - Benign     Recommendation:   Routine screening mammogram of both breasts in 1 year  A    reminder letter will be scheduled  Analyzed by CAD     8-10% of cancers will be missed on mammography  Management of a    palpable abnormality must be based on clinical grounds  Patients   will be notified of their results via letter from our facility  Accredited by [employer] of [employer] and FDA       Transcription Location: [facility] 98: [id number]     Risk Value(s):   Tyrer-Cuzick 10 Year: 0 908%, Tyrer-Cuzick Lifetime: 0 908%,    Myriad Table: 1 5%, LAURY 5 Year: 1 5%, NCI Lifetime: 2 1%   Signed by:   Ryna Urrutia MD   1/25/17

## 2018-01-13 NOTE — PROGRESS NOTES
REPORT NAME: Progress Notes Report  VISIT DATE: 11/21/2017  VISIT TIME: 4:39 PM EST  PATIENT NAME: Gina Marrero  MEDICAL RECORD NUMBER: [id number]  YOB: 1934  AGE: 80  REFERRING PHYSICIAN: Alex Montaño  SUPERVISING CLINICIAN: [address] CARE PROVIDER: Mattie Nixon  PATIENT HOME ADDRESS: [address], [facility] [address]  PATIENT HOME PHONE: [phone]  SOCIAL SECURITY NUMBER:   DIAGNOSIS 1: Mitral Valve Replacement / [id number]  DIAGNOSIS 2:   INR RANGE: 2 5 - 3 5  INR GOAL: 3  TREATMENT START DATE:   TREATMENT END DATE:   NEXT VISIT: 12/12/2017  [facility]  VISIT RESULTS  ENCOUNTER NUMBER:   TEST LOCATION: [facility]  TEST TYPE: Outside Lab (Venipuncture)  VISIT TYPE: Phone Consult  CURRENT INR: 2 66 PROTIME: 28 7  SPECIMEN COL AND RPT DATE: 11/21/2017 4:39  PM EST  VITAL SIGNS  PULSE:  BP: / WEIGHT:  HEIGHT:  TEMP:   CURRENT ANTICOAGULANT DOSING SCHEDULE  DOSE SIZE: 5mg    ANTICOAGULANT TYPE: WARFARIN  DOSING REGIMEN  Sun       Mon       Tues      Wed       Thurs     Fri       Sat  Total/Wk  5         5         5         5         5         5         5         35  PATIENT MEDICATION INSTRUCTION: Yes  PATIENT NUTRITIONAL COUNSELING: No  PATIENT BRUISING INSTRUCTION: No  LAST EDUCATION DATE:   PREVIOUS VISIT INFORMATION  VISITDATE  INRGoal INR   Sun    Mon    Tues   Wed    Thurs  Fri    Sat  Total/wk  11/21/2017  3       2 66  5      5      5      5      5      5      5  35  11/7/2017   3       2 33  5      5      2 5    5      5      5      5  32 5  10/17/2017  3       2 67  5      5      2 5    5      5      5      5  32 5  10/3/2017   3       3 06  5      5      2 5    5      5      5      5  32 5  ADDITIONAL PREVIOUS VISIT INFORMATION  VISITDATE   PRIMARY RX               DOSE      CrCl  11/21/2017  WARFARIN                 5mg  11/7/2017   WARFARIN                 5mg  10/17/2017  WARFARIN                 5mg  10/3/2017   WARFARIN                 5mg  OTHER CURRENT MEDICATIONS:  WARFARIN, WARFARIN  PROGRESS NOTES:   PATIENT INSTRUCTIONS: 11/21/17, tc pt, currently taking 5 mg daily, will  cont 5 mg daily, rech 3 weeks, 12/12  bharathi  TEST LOCATION: [facility], , ,   INBOUND LAB DATA:  Lab       Lab Value Col Date                 Rpt Date                 Lab  Reference Range  Electronically signed by:  Ashok Landau Perinotti on 11/21/2017 4:39 PM EST

## 2018-01-13 NOTE — PROGRESS NOTES
REPORT NAME: Progress Notes Report  VISIT DATE: 6/14/2017  VISIT TIME: 4:24 PM EDT  PATIENT NAME: Giancarlo Uribe  MEDICAL RECORD NUMBER: [id number]  YOB: 1934  AGE: 80  REFERRING PHYSICIAN: Berl Lesch  SUPERVISING CLINICIAN: [address] [facility] CARE PROVIDER: Ayesha Nixon  PATIENT HOME ADDRESS: [place], [place], [facility] [address]  PATIENT HOME PHONE: [phone]  SOCIAL SECURITY NUMBER:   DIAGNOSIS 1: Mitral Valve Replacement / 424 0  DIAGNOSIS 2:   INR RANGE: 2 5 - 3 5  INR GOAL: 3  TREATMENT START DATE:   TREATMENT END DATE:   NEXT VISIT: 6/21/2017  [facility]  VISIT RESULTS  ENCOUNTER NUMBER:   TEST LOCATION: [facility]  TEST TYPE: Outside Lab (Venipuncture)  VISIT TYPE: Phone Consult  CURRENT INR: 2 18 PROTIME: 24 5  SPECIMEN COL AND RPT DATE: 6/14/2017 4:24  PM EDT  VITAL SIGNS  PULSE:  BP: / WEIGHT:  HEIGHT:  TEMP:   CURRENT ANTICOAGULANT DOSING SCHEDULE  DOSE SIZE: 5mg    ANTICOAGULANT TYPE: WARFARIN  DOSING REGIMEN  Sun       Mon       Tues      Wed       Thurs     Fri       Sat  Total/Wk  2 5       5         5         5         5         5         5         32 5  PATIENT MEDICATION INSTRUCTION: Yes  PATIENT NUTRITIONAL COUNSELING: No  PATIENT BRUISING INSTRUCTION: No  LAST EDUCATION DATE:   PREVIOUS VISIT INFORMATION  VISITDATE  INRGoal INR   Sun    Mon    Tues   Wed    Thurs  Fri    Sat  Total/wk  6/14/2017   3       2 18  2 5    5      5      5      5      5      5  32 5  6/12/2017   3       5 6   0      HOLD   HOLD   0      0      0      0  0  5/24/2017   3       2 24  5      5      5      5      5      5      5  35  5/10/2017   3       2 74  5      5      5      5      5      5      5  35  ADDITIONAL PREVIOUS VISIT INFORMATION  VISITDATE   PRIMARY RX               DOSE      CrCl  6/14/2017   WARFARIN                 5mg  6/12/2017   WARFARIN                 5mg  5/24/2017   WARFARIN                 5mg  5/10/2017   WARFARIN                 5mg  OTHER CURRENT MEDICATIONS:  WARFARIN, WARFARIN  PROGRESS NOTES:   PATIENT INSTRUCTIONS: 6/14/17, tc pt, 2 5 mg sun, 5 mg others, rech 1 week,  6/21  bharathi  TEST LOCATION: [facility], , ,   INBOUND LAB DATA:  Lab       Lab Value Col Date                 Rpt Date                 Lab  Reference Range  Electronically signed by:  Demarcus Robert on 6/14/2017 4:24 PM EDT

## 2018-01-13 NOTE — PROGRESS NOTES
REPORT NAME: Progress Notes Report  VISIT DATE: 4/26/2017  VISIT TIME: 4:13 PM EDT  PATIENT NAME: Damian Fitch  MEDICAL RECORD NUMBER: [id number]  YOB: 1934  AGE: 80  REFERRING PHYSICIAN: Marylen Screen  SUPERVISING CLINICIAN: [address] [facility] CARE PROVIDER: Sierra Nixon  PATIENT HOME ADDRESS: [address], [facility] [address]  PATIENT HOME PHONE: [phone]  SOCIAL SECURITY NUMBER:   DIAGNOSIS 1: Mitral Valve Replacement / 424 0  DIAGNOSIS 2:   INR RANGE: 2 5 - 3 5  INR GOAL: 3  TREATMENT START DATE:   TREATMENT END DATE:   NEXT VISIT: 5/3/2017  [facility]  VISIT RESULTS  ENCOUNTER NUMBER:   TEST LOCATION: [facility]  TEST TYPE: Outside Lab (Venipuncture)  VISIT TYPE: Phone Consult  CURRENT INR: 1 69 PROTIME: 19 8  SPECIMEN COL AND RPT DATE: 4/26/2017 4:13  PM EDT  VITAL SIGNS  PULSE:  BP: / WEIGHT:  HEIGHT:  TEMP:   CURRENT ANTICOAGULANT DOSING SCHEDULE  DOSE SIZE: 5mg    ANTICOAGULANT TYPE: WARFARIN  DOSING REGIMEN  Sun       Mon       Tues      Wed       Thurs     Fri       Sat  Total/Wk  5         2 5       5         7 5       5         5         5         35  5         2 5       5         5         5         5         5         32 5  PATIENT MEDICATION INSTRUCTION: Yes  PATIENT NUTRITIONAL COUNSELING: No  PATIENT BRUISING INSTRUCTION: No  LAST EDUCATION DATE:   PREVIOUS VISIT INFORMATION  VISITDATE  INRGoal INR   Sun    Mon    Tues   Wed    Thurs  Fri    Sat  Total/wk  4/26/2017   3       1 69  5      2 5    5      7 5    5      5      5  35  5      2 5    5      5      5      5      5  32 5  4/17/2017   3       3 63  5      2 5    5      5      5      2 5    5  30  3/30/2017   3       3 03  5      5      5      5      5      5      2 5  32 5  3/23/2017   3       2 72  5      5      5      5      5      5      2 5  32 5  ADDITIONAL PREVIOUS VISIT INFORMATION  VISITDATE   PRIMARY RX               DOSE      CrCl  4/26/2017   WARFARIN                 5mg  4/17/2017 WARFARIN                 5mg  3/30/2017   WARFARIN                 5mg  3/23/2017   WARFARIN                 5mg  OTHER CURRENT MEDICATIONS:  WARFARIN, WARFARIN  PROGRESS NOTES:   PATIENT INSTRUCTIONS: 5/26/17, tc pt, will take 7 5 mg today in place of 5  mg then 2 5 mg mon, 5 mg others, rech 1 week, 5/3  bharathi  TEST LOCATION: [facility], , ,   INBOUND LAB DATA:  Lab       Lab Value Col Date                 Rpt Date                 Lab  Reference Range  Electronically signed by:  Sergo Robert on 4/26/2017 4:13 PM EDT

## 2018-01-14 VITALS
HEIGHT: 64 IN | TEMPERATURE: 97.4 F | RESPIRATION RATE: 12 BRPM | SYSTOLIC BLOOD PRESSURE: 102 MMHG | BODY MASS INDEX: 28.51 KG/M2 | DIASTOLIC BLOOD PRESSURE: 56 MMHG | HEART RATE: 64 BPM | WEIGHT: 167 LBS

## 2018-01-14 VITALS
DIASTOLIC BLOOD PRESSURE: 56 MMHG | HEIGHT: 64 IN | RESPIRATION RATE: 12 BRPM | HEART RATE: 72 BPM | SYSTOLIC BLOOD PRESSURE: 110 MMHG | BODY MASS INDEX: 27.66 KG/M2 | WEIGHT: 162 LBS

## 2018-01-14 VITALS
HEART RATE: 72 BPM | BODY MASS INDEX: 27.98 KG/M2 | WEIGHT: 163 LBS | RESPIRATION RATE: 16 BRPM | SYSTOLIC BLOOD PRESSURE: 128 MMHG | DIASTOLIC BLOOD PRESSURE: 78 MMHG

## 2018-01-14 VITALS
HEART RATE: 80 BPM | HEIGHT: 64 IN | WEIGHT: 161 LBS | SYSTOLIC BLOOD PRESSURE: 124 MMHG | TEMPERATURE: 96.4 F | DIASTOLIC BLOOD PRESSURE: 70 MMHG | BODY MASS INDEX: 27.49 KG/M2

## 2018-01-14 NOTE — MISCELLANEOUS
Message   Recorded as Task   Date: 03/23/2016 02:37 PM, Created By: Mckenzie Villagomez   Task Name: Care Coordination   Assigned To: [address] [place] [address] clinical,Team   Regarding Patient: Maia Lopez, Status: Active   CommentDorine Elda - 23 Mar 2016 2:37 PM     TASK CREATED  Please cancel coumadin hold request for mackenzie sauceda as well as the epidural injection  She has a mechanical mitral valve and we can not hold  her coumadin per her cardiologist  Kimberly Akins  Please let patient know as well  Divya Yeager - 28 Mar 2016 1:42 PM     TASK REASSIGNED: Previously Assigned To [address] [place] [address] end procedure,Team   Sally Yangu - 28 Mar 2016 3:36 PM     TASK REASSIGNED: Previously Assigned To [address] [place] [address] clinical,Team  I spoke with pt and advised of the same  Pt states that she changed cardiologists  She goes to the same cardiology group,but now she sees DR Bipin Shultz instead of Dr Adria Dyson  She states that Dr Adria Dyson allowed her to hold coumadin this past summer for colonoscopy and that she used lovenox  Pt states she doesn't understand-states she would like to have the injection b/c she has to do something  Pt states she may call Dr Adria Dyson  I advised pt that is up to her but that Clarence Garcia is going by the recommendations of her current cardiologist   Pt understood and was appreciative  She would like to know your recommendations from here? Preeti Denton - 29 Mar 2016 11:29 AM     TASK REPLIED TO: Previously Assigned To Mckenzie Villagomez  unfortunately it is safest for her to avoid the injection at this point the coumadin is more important  We can reinitiate the percocet that she was taking in the past for pain relief  if interested  let me know? Mk Yang - 29 Mar 2016 12:25 PM     TASK REPLIED TO: Previously Assigned To [address] [place] [address] end clinical,Team  JORJE left on home phone to garett     MaygretchenMk - 29 Mar 2016 12:25 PM     TASK IN PROGRESS   MayMk godinez - 29 Mar 2016 3:48 PM     TASK REPLIED TO: Previously Assigned To [facility] [employer] with pt and advised of the same  Pt states that percocet did not help her in the past and it made her sick,she couldn't eat  Pt states she is not interested in taking  Pt reiterated with me how she changed doctors and how she was able to hold coumadin in the past and take lovenox  Pt states she is going to call and talk to Dr Tommy Montiel states she will call us back with update  Preeti Denton - 29 Mar 2016 4:05 PM     TASK REPLIED TO: Previously Assigned To [place]  aware  Per Dr Duke Saenz he does not wish to pursue this option as she did not have relief in the past, also Dr Goldie Hood states when we tried to hold her coumadin for SCS that unless the procedure will guarantee relief rather than a diagnostic procedure he would not hold the coumadin  Tamar Floyd - 30 Mar 2016 8:52 AM     TASK EDITED  TC from pt, I informed her of Dr Lenora Candelaria recommendations as outlined by Preeti's task note  Pt stated she s/w Dr Lv Pemberton yest about the issue who said she reviewed her charts and did not see any conversation documented that coumadin couldn't be held and Hector suggested someone from our office call and discuss matter with her and then she would talk to Dr Goldie Hood about it  Pt is really interested in pursing getting the inj done to help her pain,she does not want to go back on the percocet  AnnaRose Perinotti's c/b # is [phone]  Tamar Floyd - 30 Mar 2016 9:41 AM     TASK EDITED  Discussed pt's call with Fortino Saenz will not perform TFESI b/c pt's has mechanical valve and her coumadin can not be stopped, it is a safety issue  Explained to pt that the SIJ inj doesn't require coumadin to be held but that didn't provide any relief in the past, so pain medication is her other options for now  Pt doesn't want to do percocet, said she uses tramadol  that her PCP prescribes   Can take tramadol up to TID but only does that when the pain is bad  Advised pt to f/u with PCP about tramadol or she can come in for ov to discuss pain meds  Pt opted for ov w/ Dr Patricia Feeling  Pt given ov for tomorrow at 2:30 w/ Dr Patricia Feeling  GlennOdilon - 30 Mar 2016 10:01 AM     TASK REPLIED TO: Previously Assigned To Marcelle Lees  aware and agree        Active Problems    1  Analgesic use (V58 69) (Z79 899)   2  Anticoagulated by anticoagulation treatment (V58 61) (Z79 01)   3  Arthritis (716 90) (M19 90)   4  Atrial fibrillation (427 31) (I48 91)   5  Backache (724 5) (M54 9)   6  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)   7  Burning sensation (782 0) (R20 8)   8  Coagulation defect (286 9) (D68 9)   9  Current use of long term anticoagulation (V58 61) (Z79 01)   10  Disc degeneration, 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)   11  Encounter for Medicare annual wellness exam (V70 0) (Z00 00)   12  Encounter for screening colonoscopy (V76 51) (Z12 11)   13  Encounter for screening mammogram for malignant neoplasm of breast (V76 12)    (Z12 31)   14  Hematuria (599 70) (R31 9)   15  Hyperlipidemia (272 4) (E78 5)   16  Hypertension (401 9) (I10)   17  Limb pain (729 5) (M79 609)   18  Limb Weakness (Paresis) (728 87)   19  Low back pain (724 2) (M54 5)   20  Lumbar radiculopathy (724 4) (M54 16)   21  Lumbar stenosis with neurogenic claudication (724 03) (M48 06)   22  History of Mitral Valve Replacement   23  Myofascial pain syndrome (729 1) (M79 1)   24  Osteopenia (733 90) (M85 80)   25  Pain syndrome, chronic (338 4) (G89 4)   26  Sacroiliitis (720 2) (M46 1)   27  Senile purpura (287 2) (D69 2)   28  Spinal stenosis (724 00) (M48 00)   29  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 myelopathy or radiculopathy (721 3)    (M47 817)   30  Tendonitis Of The Left Shoulder (726 10)   31  Type 2 diabetes mellitus (250 00) (E11 9)   32  Visit for screening mammogram (V76 12) (Z12 31)   33  Vitamin D deficiency (268 9) (E55 9)    Current Meds   1   Atenolol 50 MG Oral Tablet; take one tablet by mouth every day; Therapy: 35XFT5206 to (955 2644)  Requested for: 06RKK3838; Last   Rx:[id number] Ordered   2  Gabapentin 300 MG Oral Capsule; TAKE 2 CAPSULES BY MOUTH 3 TIMES A DAY; Therapy: 45JZB7860 to (Evaluate:84Plv4454)  Requested for: 73ONH5341; Last   Rx:[id number] Ordered   3  Losartan Potassium-HCTZ 100-12 5 MG Oral Tablet; TAKE 1 TABLET DAILY; Therapy: 38EUL8474 to (JYLCZXDK:26GOC2148)  Requested for: 60HFC5194; Last   Rx:49Mvd3548 Ordered   4  MetFORMIN HCl  MG Oral Tablet Extended Release 24 Hour; take one tablet by   mouth every day; Therapy: 41HIX5266 to (GFSBIPHH:46MGA0026)  Requested for: 52YSX9479; Last   Rx:46Ltp3859 Ordered   5  Pravastatin Sodium 40 MG Oral Tablet; Take 1 tablet daily; Therapy: 61CRR1413 to (Evaluate:39Frx0415)  Requested for: 61YCF3786; Last   Rx:87Wyd4073 Ordered   6  TraMADol HCl - 50 MG Oral Tablet; Take 1 tablet 3 times daily as needed; Therapy: 30NVW5324 to (Derl Kehr)  Requested for: 37ZXE5740; Last   Rx:[id number] Ordered   7  Warfarin Sodium 5 MG Oral Tablet; TAKE 1 TABLET DAILY AS DIRECTED; Therapy: 29MRN7797 to (252 2761)  Requested for: 41HZJ8582; Last   Rx:[id number] Ordered    Allergies    1   Lisinopril TABS    Signatures   Electronically signed by : Jay Staton, ; Mar 30 2016 10:07AM EST                       (Author)

## 2018-01-14 NOTE — PROGRESS NOTES
REPORT NAME: Patient Visit Summary Report   VISIT DATE: 2/17/2017  VISIT TIME: 2:22 PM EST  PATIENT NAME: Carly Lazo  MEDICAL RECORD NUMBER: [id number]  SOCIAL SECURITY NUMBER:   YOB: 1934  AGE: 80  REFERRING PHYSICIAN: Morgan Eduardo  SUPERVISING CLINICIAN: [address] CARE PROFESSIONAL: Angel Nixon  PATIENT HOME ADDRESS: [address], [address] [place], [place] [address]  PATIENT HOME PHONE: [phone]  DIAGNOSIS 1: Mitral Valve Replacement / 424 0  DIAGNOSIS 2:   DIAGNOSIS 3:   DIAGNOSIS 4:   INR RANGE: 2 5 - 3 5  INR GOAL: 3  TREATMENT START DATE:   TREATMENT END DATE:   NEXT VISIT: 2/24/2017  [facility]    VISIT RESULTS   ENCOUNTER NUMBER:   TEST LOCATION: [facility]  TEST TYPE: Outside Lab (Venipuncture)  VISIT TYPE: Phone Consult  CURRENT INR: 1 91 PROTIME: 21 7  SPECIMEN COL AND RPT DATE: 2/17/2017 2:22  PM EST    VITAL SIGNS  PULSE:  B/P:  WEIGHT:  HEIGHT:  TEMP:     CURRENT ANTICOAGULANT DOSING SCHEDULE  DOSE SIZE: 5mg    ANTICOAGULANT TYPE: WARFARIN  DOSING REGIMEN  Sun       Mon Tues Wed Thurs Fri       Sat  Total/Wk  5         5         5         5         5         5         2 5       32 5    PATIENT MEDICATION INSTRUCTION: Yes  PATIENT NUTRITIONAL COUNSELING: No  PATIENT BRUISING INSTRUCTION: No      LAST EDUCATION DATE:       PREVIOUS VISIT INFORMATION  VISITDATE   INR Goal  INR   Sun     Mon Tues Wed Thurs Fri  Sat     Total/wk  2/17/2017   3         1 91  5       5       5       5       5       5  2 5     32 5  2/14/2017   3         1 47  5       5       7 5     5       5       2 5  2 5     32 5  2/10/2017   3         2 87  5       5       0       0       0       2 5  2 5     15  2/8/2017    3         5 59  0       0       0       HOLD    HOLD    0  0       0    ADDITIONAL PREVIOUS VISIT INFORMATION  VISITDATE   PRIMARY RX               DOSE      CrCl  2/17/2017   WARFARIN                 5mg                 2/14/2017   WARFARIN 5mg                 2/10/2017   WARFARIN                 5mg                 2/8/2017    WARFARIN                 5mg                     OTHER CURRENT MEDICATIONS: WARFARIN, WARFARIN      PROGRESS NOTES:     PATIENT INSTRUCTIONS: 2/17/17, tc pt, 2 5 mg sat, 5 mg others, rech 1 week,  2/24  bharathi  TEST LOCATION: [facility]    Electronically signed by:  Rufus Robert on 2/17/2017 at 2:22 PM EST

## 2018-01-14 NOTE — PROGRESS NOTES
Assessment    1  Lumbar radiculopathy (724 4) (M54 16)   2  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 myelopathy or radiculopathy (721 3)   (M47 817)   3  Anticoagulated by anticoagulation treatment (V58 61) (Z79 01)    Plan  Lumbar radiculopathy    · Nerve Block Transforaminal Epidural Steroid Injection Lumbar; Status:Active; Requested  for:27May2016;    Perform:[facility]; Order Comments:(R) L4 TFESI needs coumadin bridge to Lovenox and then hold for the procedure; Due:27May2016;Ordered; For:Lumbar radiculopathy; Ordered By:Odilon Elizabeth;  Lumbar stenosis with neurogenic claudication    · Gabapentin 300 MG Oral Capsule; TAKE 2 CAPSULES BY MOUTH 3 TIMES A  DAY   Rx By: Shane Gibson; Dispense: 30 Days ; #:180 Capsule; Refill: 1; For: Lumbar stenosis with neurogenic claudication; ROBBIN = N; Sent To: [facility]    Discussion/Summary    Ms Shay Rojas returns in followup to discuss her back and radiating leg pain consistent with lumbar radiculopathy  She is interested in pursuing another epidural steroid injection at this point  This will require holding of her Coumadin with a bridge to Lovenox which will then be held for the procedure  This has been reviewed with her cardiologist  Cardiology will assist with transition  We'll schedule patient for right L4 transforaminal epidural steroid injection  This may need to be repeated  Complete risks and benefits including bleeding, infection, tissue reaction, allergic reaction were discussed  Verbal and written consent were obtained  She will continue gabapentin in the meantime  She does have tramadol available to her and is not require a refill  Chief Complaint    1  Back Pain    History of Present Illness  Ms Shay Rojas returns in followup to discuss her back and radiating leg pain consistent with lumbar radiculopathy  She is interested in pursuing another epidural steroid injection at this point   This will require holding of her Coumadin with a bridge to Lovenox which will then be held for the procedure  This has been reviewed with her cardiologist  Cardiology will assist with transition  Currently the patient states that her axial back pain has improved since the radiofrequency ablation but she is continuing to have radiating leg pain in a right L4 distribution  She describes this as a 7/10 type pain that is intermittent and described as a burning sensation  This is more problematic in the morning  Medications do provide about 70% relief of her symptoms  Full intake form was reviewed with the patient  The patient denied any changes to their past medical or surgical history except for what was described above  Patient denied any changes to their medication regimen, except for what was described above  I have personally reviewed and/or updated the patient's past medical history, past surgical history, family history, social history, current medications, allergies, and vital signs today  Yola Sessions presents with complaints of gradual onset of intermittent episodes of severe right lower back pain, described as sharp and burning, radiating to the right buttock, right thigh, right lower leg and right foot  On a scale of 1 to 10, the patient rates the pain as 7  Symptoms are improving  Review of Systems    Constitutional: no fever, no recent weight gain and no recent weight loss  Eyes: no double vision and no blurry vision  Cardiovascular: no chest pain, no palpitations and no lower extremity edema  Respiratory: no complaints of shortness of breath and no wheezing  Musculoskeletal: joint stiffness and joint swelling right ankle, but no difficulty walking, no muscle weakness, no limb swelling`, no pain in extremity and no decreased range of motion  Neurological: no dizziness, no difficulty swallowing, no memory loss, no loss of consciousness and no seizures     Gastrointestinal: constipation, but no nausea, no vomiting and no diarrhea  Genitourinary: no difficulty initiating urine stream, no genital pain and no frequent urination  Integumentary: no complaints of skin rash  Psychiatric: no depression  Endocrine: no excessive thirst, no adrenal disease, no hypothyroidism and no hyperthyroidism  Hematologic/Lymphatic: no tendency for easy bruising and no tendency for easy bleeding  Active Problems    1  Analgesic use (V58 69) (Z79 899)   2  Anticoagulated by anticoagulation treatment (V58 61) (Z79 01)   3  Arthritis (716 90) (M19 90)   4  Atrial fibrillation (427 31) (I48 91)   5  Backache (724 5) (M54 9)   6  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)   7  Burning sensation (782 0) (R20 8)   8  Coagulation defect (286 9) (D68 9)   9  Current use of long term anticoagulation (V58 61) (Z79 01)   10  Disc degeneration, 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)   11  Encounter for Medicare annual wellness exam (V70 0) (Z00 00)   12  Encounter for screening colonoscopy (V76 51) (Z12 11)   13  Encounter for screening mammogram for malignant neoplasm of breast (V76 12)    (Z12 31)   14  Hematuria (599 70) (R31 9)   15  Hyperlipidemia (272 4) (E78 5)   16  Hypertension (401 9) (I10)   17  Limb pain (729 5) (M79 609)   18  Limb Weakness (Paresis) (728 87)   19  Low back pain (724 2) (M54 5)   20  Lumbar radiculopathy (724 4) (M54 16)   21  Lumbar stenosis with neurogenic claudication (724 03) (M48 06)   22  History of Mitral Valve Replacement   23  Myofascial pain syndrome (729 1) (M79 1)   24  Osteopenia (733 90) (M85 80)   25  Pain syndrome, chronic (338 4) (G89 4)   26  Sacroiliitis (720 2) (M46 1)   27  Senile purpura (287 2) (D69 2)   28  Spinal stenosis (724 00) (M48 00)   29  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 myelopathy or radiculopathy (721 3)    (M47 817)   30  Tendonitis Of The Left Shoulder (726 10)   31  Type 2 diabetes mellitus (250 00) (E11 9)   32  Visit for screening mammogram (V76 12) (Z12 31)   33   Vitamin D deficiency (268 9) (E55 9)    Past Medical History    1  History of Arthritis (V13 4)   2  History of Benign Polyps Of The Large Intestine (V12 72)   3  History of endocarditis (V12 59) (Z86 79)   4  History of hypertension (V12 59) (Z86 79)   5  Low back pain (724 2) (M54 5)   6  History of Stroke Syndrome (436)   7  History of Stroke Syndrome (436)    Surgical History    1  History of Cataract Surgery   2  History of Gallbladder Surgery   3  History of Hemilaminect Decompress Part Facetectomy 1 Lumbar Interspace   4  History of Mitral Valve Replacement   5  History of Neuroplasty Decompression Median Nerve At Carpal Tunnel   6  History of Rotator Cuff Repair    Family History  Mother    1  Family history of Disorders Of Blood And Blood-forming Organs (V18 3)  Father    2  Family history of Acute Myocardial Infarction (V17 3)  Maternal Grandfather    3  Family history of Cancer  Paternal Grandfather    4  Family history of Heart Disease (V17 49)  Family History    5  Family history of Diabetes Mellitus (V18 0)   6  Family history of Family Health Status 2  Children Living   7  Family history of Family Health Status Siblings 5  Living   8  Family history of Maternal Grandfather Is    5  Family history of Maternal Grandmother Is    8  Family history of Paternal Grandfather Is    6  Family history of Paternal Grandmother Is     Social History    · Denied: History of Alcohol Use (History)   · Denied: History of Drug Use   · Marital History -    · Never a smoker   · Occupation: Retired   · Retired From Work    Current Meds   1  Atenolol 50 MG Oral Tablet; take one tablet by mouth every day; Therapy: 98PRM3295 to [phone])  Requested for: 42XSI4580; Last   Rx:52Ptk5617 Ordered   2  Gabapentin 300 MG Oral Capsule; TAKE 2 CAPSULES BY MOUTH 3 TIMES A DAY; Therapy: 22YHX6141 to (Evaluate:2016)  Requested for: 50Ydz3501; Last   Rx:2016 Ordered   3   Losartan Potassium-HCTZ 100-12 5 MG Oral Tablet; TAKE 1 TABLET DAILY; Therapy: 54ZOC4143 to (UVTTYXVD:66BKG7669)  Requested for: 63XTK9825; Last   Rx:48Eyd1841 Ordered   4  MetFORMIN HCl  MG Oral Tablet Extended Release 24 Hour; take one tablet by   mouth every day; Therapy: 32NRI7211 to (PTTWPJHP:75MUE5796)  Requested for: 82EPX4248; Last   Rx:94Des7578 Ordered   5  Pravastatin Sodium 40 MG Oral Tablet; Take 1 tablet daily; Therapy: 28JNO8679 to (Evaluate:79Fja5148)  Requested for: 09PLC7447; Last   Rx:49Cze4900 Ordered   6  TraMADol HCl - 50 MG Oral Tablet; Take 1 tablet 3 times daily as needed; Therapy: 31QJL8125 to (Mathew Jones)  Requested for: 31DCJ8178; Last   Rx:65Alo8728 Ordered   7  Warfarin Sodium 5 MG Oral Tablet; TAKE 1 TABLET DAILY AS DIRECTED; Therapy: 93QLP0250 to ([phone])  Requested for: 26ZTZ6098; Last   Rx:16Ayg5415 Ordered    Allergies    1  Lisinopril TABS    Vitals  Vital Signs [Data Includes: Current Encounter]    Recorded: [id number] 10:45AM   Temperature 97 9 F   Heart Rate 84   Systolic 281   Diastolic 52   Height 5 ft 4 in   Weight 172 lb    BMI Calculated 29 52   BSA Calculated 1 84     Physical Exam    Constitutional   General appearance: Well developed, well nourished, alert, in no distress, non-toxic and no overt pain behavior  Eyes   Sclera: anicteric   HEENT   Hearing grossly intact  Pulmonary   Respiratory effort: Even and unlabored  Psychiatric   Mood and affect: Mood and affect appropriate  Neurologic   Cranial nerves: Cranial nerves II-XII grossly intact  Musculoskeletal   Gait and station: Abnormal   antalgic, using cane  Lumbar/Sacral Spine examination demonstrates    Lumbosacral Spine: Foot and ankle strength was normal bilaterally  Knee strength was normal bilaterally  Hip strength was normal bilaterally  Evaluation of Muscle Stretch Reflexes on the right side demonstrates 0/4 Knee Jerk Reflex and 0/4 Ankle Jerk Reflex  Evaluation of Muscle Stretch Reflexes on the left side demonstrates 0/4 Ankle Jerk Reflex, but 2/4 Knee Jerk Reflex  Results/Data  Results Free Text Form [facility]:   Results     Other  Post RFA  Results   * MRI Lumbar Spine With and Without Contrast 23WPT6865 09:26AM Ariana Brush     Test Name Result Flag Reference   MRI LSpine W/ & W/O (Report)     [address];;[place];[address]  05/08/2014 1250  05/08/2014 1350  N/A    MRI LUMBAR SPINE WITH AND WITHOUT CONTRAST    INDICATION- Chronic back pain  Lumbar radiculopathy  Spinal  stenosis  Patient reports low back pain radiating to both legs    COMPARISON- Prior MRI October 26, 2013    TECHNIQUE-  Sagittal T1, sagittal T2, sagittal inversion recovery, axial T1 and  axial T2, coronal T2  Sagittal and axial T1 postcontrast   9 mL of Gadavist was injected intravenously with no immediate  consequence  IMAGE QUALITY-  Diagnostic    FINDINGS-    ALIGNMENT- Slight levoscoliosis apex L3  MARROW SIGNAL-  Heterogeneous marrow signal is likely age related  DISTAL CORD AND CONUS-  Normal size and signal of the distal cord and  conus  The conus ends at the L1 level  PARASPINAL SOFT TISSUES-  Bilateral renal lesions are identified the  largest is in the lower pole exophytic and is T2 hyperintense measuring  6 4 cm with almost certainly a cyst  An indeterminate hypointense T2  lesion measures 15 mm immediately superior to this in the mid to lower  pole laterally  These lesions are stable from the prior study however  are indeterminate on this study  SACRUM-  Normal signal within the sacrum  No evidence of insufficiency  or stress fracture  LOWER THORACIC DISC SPACES-  Normal disc height and signal  No disc  herniation, canal stenosis or foraminal narrowing  LUMBAR DISC SPACES-       L1-L2- Normal     L2-L3- Normal     L3-L4- Degenerative disc disease with disc osteophyte complex  eccentric to the right   Moderate facet hypertrophy noted  There is  mild to moderate central stenosis, and mild to moderate bilateral  foraminal narrowing  L4-L5- Degenerative disc osteophyte complex with marginal osteophytes  and moderate facet hypertrophy combined result in mild central and mild  to moderate left foraminal narrowing  Mild right foraminal narrowing  noted  L5-S1- Degenerative disc osteophyte complex eccentric to the right  results in mild right foraminal narrowing  Central canal is patent  Severe facet hypertrophy noted  POSTCONTRAST IMAGING- No abnormal enhancement  IMPRESSION-    1  Stable spondylotic degenerative changes as detailed above  Mild to  moderate central and bilateral foraminal narrowing noted at L3-4 and  moderate left foraminal narrowing at L4-5  There is no greater than  mild central or foraminal narrowing elsewhere          Transcribed on- [address] [place] [address] Dr, RAD DO  Reading Radiologist- MARCELLA Moreno DO  Releasing Radiologist- MARCELLA Moreno DO  Released Date Time- 05/08/14 1644  ------------------------------------------------------------------------------  0148^SANDRA Perez  1200^JOSE ROBERTO Perez     Future Appointments    Date/Time Provider Specialty Site   06/29/2016 11:40 AM Marleni Price DO Cardiology  CARDIOLOGY  [place]   09/20/2016 09:00 AM Mony Herrera, [facility] AND [facility]     Signatures   Electronically signed by : Zeeshan Pleitez DO; May 27 2016 11:02AM EST                       (Author)

## 2018-01-14 NOTE — MISCELLANEOUS
Message   Recorded as Task   Date: 11/08/2016 08:05 AM, Created By: Aurora Dalal   Task Name: Review Document   Assigned To: [address] [place] [address] clinical,Team   Regarding Patient: Ata Herrera, Status: In Progress   Les Yuan - 08 Nov 2016 8:05 AM     TASK CREATED  Please contact patient and ask if she is taking any other pain medication besides the tramadol we prescribe  her UDS from 11-2-16 shows positive for noroxycodone and oxymorphone analyte, which usually follows administration of oxycodone  Kemar Champion - 08 Nov 2016 11:24 AM     TASK REPLIED TO: Previously Assigned To [address] [place] [address],Team  Attempted to reach pt  LMOM for pt  to c/b  Carissa Londono - 08 Nov 2016 11:24 AM     TASK IN PROGRESS   Shahzad Miles - 08 Nov 2016 2:35 PM     TASK EDITED  S/w pt, states she took oxycodone before she was to have the SCS  Pt states it has been months since she took oxycodone  States her cardiologist would not approve the prolonged coumadin hold necessary for the SCS  Pt states she takes only tramadol and tylenol  Advised pt, will make Angelica Delay aware and cb if there is anything additional  Pt verbalized understanding and appreciation  Aurora Dalal - 09 Nov 2016 9:00 AM     TASK REPLIED TO: Previously Assigned To Aurora Champion - 09 Nov 2016 9:11 AM     TASK REPLIED TO: Previously Assigned To [address] [place] [address],Team  Received VM from pt  on [place] triage line  Pt  states that if possible she would like to talk to Angelica Delay  Pt  states that someone called yesterday, but she couldn't get back to them  Pt  states she has an appt  at 130 today, and is requesting a c/b before then     Carissa Londono - 09 Nov 2016 10:34 AM     TASK REPLIED TO: Previously Assigned To [address] [place] [address] clinical,Team  ****FYI****    S/w pt  who states "I think I got to the bottom of this diagnosis in my urine " Pt  states that she was "wracking my brain" trying to think of what happened because she had given her daughter her bottle of Oxycodone to dispose of at the hospital a while ago  Per pt  she has a "pill box" in her purse that she keeps some of her medication in for in case she gets pain while she is out of the house  Pt  states that she decided to go back through her pill box and found two tabs of Oxycodone still in there from a previous rx  Pt  states that she thought she had gotten rid of all of them when she was put on Tramadol, but per pt  they look very similar, both "small, white and round" pills  Pt  states that she bets she took one of the Oxycodone that was mixed in her pill box by accident thinking it was Tramadol  Pt  states that she went through and pulled out the last two tablets of Oxycodone that were still mixed in her pill box last night and disposed of them  Pt  was advised that we appreciate this information, will relay to AO and if she has any other questions we will c/b  Otherwise, f/u as schedule  Confirmed OV on 1/25/17, arrival 10 am w/ pt  Pt  appreciative     Shraddha Ford - 09 Nov 2016 12:17 PM     TASK REPLIED TO: Previously Assigned To Howard Mancera  TY makes sense        Active Problems    1  Analgesic use (V58 69) (Z79 899)   2  Anticoagulated by anticoagulation treatment (V58 61) (Z79 01)   3  Arthritis (716 90) (M19 90)   4  Atrial fibrillation (427 31) (I48 91)   5  Backache (724 5) (M54 9)   6  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)   7  Burning sensation (782 0) (R20 8)   8  Chronic bilateral low back pain (724 2,338 29) (M54 5,G89 29)   9  Chronic lumbar radiculopathy (724 4) (M54 16)   10  Coagulation defect (286 9) (D68 9)   11  Current use of long term anticoagulation (V58 61) (Z79 01)   12  Disc degeneration, 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)   13  Encounter for long-term use of opiate analgesic (V58 69) (Z79 891)   14  Encounter for Medicare annual wellness exam (V70 0) (Z00 00)   15   Encounter for screening colonoscopy (V76 51) (Z12 11) 16  Encounter for screening mammogram for malignant neoplasm of breast (V76 12)    (Z12 31)   17  H/O mitral valve replacement with mechanical valve (V43 3) (Z95 2)   18  Hematuria (599 70) (R31 9)   19  Hyperlipidemia (272 4) (E78 5)   20  Hypertension (401 9) (I10)   21  Limb pain (729 5) (M79 609)   22  Limb Weakness (Paresis) (728 87)   23  Lumbar stenosis with neurogenic claudication (724 03) (M48 06)   24  History of Mitral Valve Replacement   25  Myofascial pain syndrome (729 1) (M79 1)   26  Need for prophylactic vaccination and inoculation against influenza (V04 81) (Z23)   27  Osteopenia (733 90) (M85 80)   28  Pain syndrome, chronic (338 4) (G89 4)   29  Sacroiliitis (720 2) (M46 1)   30  Senile purpura (287 2) (D69 2)   31  Spinal stenosis (724 00) (M48 00)   32  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 myelopathy or radiculopathy (721 3)    (M47 817)   33  Tendonitis Of The Left Shoulder (726 10)   34  Type 2 diabetes mellitus (250 00) (E11 9)   35  Uncomplicated opioid dependence (304 00) (F11 20)   36  Visit for screening mammogram (V76 12) (Z12 31)   37  Vitamin D deficiency (268 9) (E55 9)    Current Meds   1  Atenolol 50 MG Oral Tablet; take one tablet by mouth every day; Therapy: 01KOF1392 to ([id number])  Requested for: 07YIM5190; Last   Rx:39Klu8572 Ordered   2  Gabapentin 300 MG Oral Capsule; Take 2 capsules three times daily; Therapy: 26OLO9181 to (Evaluate:02Mar2017)  Requested for: 22WBT5701; Last   Rx:02Nov2016 Ordered   3  Losartan Potassium-HCTZ 100-12 5 MG Oral Tablet; TAKE 1 TABLET DAILY; Therapy: 92NIB3972 to (WWULZQCN:14BPA0113)  Requested for: 99FGE7701; Last   Rx:32Fdg3801 Ordered   4  MetFORMIN HCl  MG Oral Tablet Extended Release 24 Hour; take one tablet by   mouth every day; Therapy: 13IEH9672 to (KPNKWKQT:53QQA4148)  Requested for: 93TOA9939; Last   Rx:28Vcp5835 Ordered   5  Pravastatin Sodium 40 MG Oral Tablet; Take 1 tablet daily;    Therapy: 22JVM6470 to (ONFXQXSB:98BGH1476)  Requested for: 86TPX9992; Last   Rx:38Kai6275 Ordered   6  TraMADol HCl - 50 MG Oral Tablet; Take 1 tablet 3 times daily as needed; Therapy: 24TRG0501 to (NJULTJQC:19TPL0433)  Requested for: 55DSF6843; Last   Rx:53Gpg8096 Ordered   7  Warfarin Sodium 5 MG Oral Tablet; TAKE 1 TABLET DAILY AS DIRECTED; Therapy: 33MFZ6580 to (Jacklyn Contreras)  Requested for: 91EZI0871; Last   Rx:28Cub0501 Ordered    Allergies    1   Lisinopril TABS    Signatures   Electronically signed by : Cheyanne Salazar RN; Nov 9 2016  1:09PM EST                       (Author)

## 2018-01-14 NOTE — PROGRESS NOTES
REPORT NAME: Patient Visit Summary Report   VISIT DATE: 7/18/2016  VISIT TIME: 2:54 PM EDT  PATIENT NAME: Carly Lazo  MEDICAL RECORD NUMBER: [id number]  SOCIAL SECURITY NUMBER:   YOB: 1934  AGE: 80  REFERRING PHYSICIAN: Morgan Eduardo  SUPERVISING CLINICIAN: [address] CARE PROFESSIONAL: Angel Nixon  PATIENT HOME ADDRESS: [address]  PATIENT HOME PHONE: [phone]  DIAGNOSIS 1: Mitral Valve Replacement / 424 0  DIAGNOSIS 2:   DIAGNOSIS 3:   DIAGNOSIS 4:   INR RANGE: 2 5 - 3 5  INR GOAL: 3  TREATMENT START DATE:   TREATMENT END DATE:   NEXT VISIT: 7/26/2016  [facility]    VISIT RESULTS   ENCOUNTER NUMBER:   TEST LOCATION: [facility]  TEST TYPE: Outside Lab (Venipuncture)  VISIT TYPE:   CURRENT INR: 3 4 PROTIME: 33 6  SPECIMEN COL AND RPT DATE: 7/18/2016 2:54  PM EDT    VITAL SIGNS  PULSE:  B/P:  WEIGHT:  HEIGHT:  TEMP:     CURRENT ANTICOAGULANT DOSING SCHEDULE  DOSE SIZE: 5mg    ANTICOAGULANT TYPE: WARFARIN  DOSING REGIMEN  Sun       Mon       Tues      Wed       Thurs     Fri       Sat  Total/Wk  5         5         5         5         5         5         5         35    PATIENT MEDICATION INSTRUCTION: Yes  PATIENT NUTRITIONAL COUNSELING: Yes  PATIENT BRUISING INSTRUCTION: No      LAST EDUCATION DATE:       PREVIOUS VISIT INFORMATION  VISITDATE   INR Goal  INR   Sun     Mon     Tues    Wed     Thurs   Fri  Sat     Total/wk  7/18/2016   3         3 4   5       5       5       5       5       5  5       35  7/12/2016   3         1 98  5       5       10      5       5       5  5       40  7/1/2016    3         2 5   2 5     5       5       5       5       5  5       32 5  6/28/2016   3         1 36  0       0       0       10      10      0  0       20    ADDITIONAL PREVIOUS VISIT INFORMATION  VISITDATE   PRIMARY RX               DOSE      CrCl  7/18/2016   WARFARIN                 5mg                 7/12/2016   WARFARIN 5mg                 7/1/2016    WARFARIN                 5mg                 6/28/2016   WARFARIN                 5mg                     OTHER CURRENT MEDICATIONS: WARFARIN, WARFARIN      PROGRESS NOTES:     PATIENT INSTRUCTIONS: 7/18/16, tc pt, cont 5 mg daily, rech one week, 7/26  will eat small amt of greens daily  bharahti  TEST LOCATION: [facility]    Electronically signed by:  Debrah Prader Perinotti on 7/18/2016 at 2:54 PM EDT

## 2018-01-14 NOTE — PROGRESS NOTES
REPORT NAME: Patient Visit Summary Report   VISIT DATE: 12/8/2016  VISIT TIME: 3:33 PM EST  PATIENT NAME: Carly Lazo  MEDICAL RECORD NUMBER: [id number]  SOCIAL SECURITY NUMBER:   YOB: 1934  AGE: 80  REFERRING PHYSICIAN: Morgan Eduardo  SUPERVISING CLINICIAN: [address] CARE PROFESSIONAL: Angel AVALOS [facility]  PATIENT HOME ADDRESS: [address], [facility] [address]  PATIENT HOME PHONE: [phone]  DIAGNOSIS 1: Mitral Valve Replacement / 424 0  DIAGNOSIS 2:   DIAGNOSIS 3:   DIAGNOSIS 4:   INR RANGE: 2 5 - 3 5  INR GOAL: 3  TREATMENT START DATE:   TREATMENT END DATE:   NEXT VISIT: 12/22/2016  [facility]    VISIT RESULTS   ENCOUNTER NUMBER:   TEST LOCATION: [facility]  TEST TYPE: Outside Lab (Venipuncture)  VISIT TYPE: Phone Consult  CURRENT INR: 2 73 PROTIME: 28 5  SPECIMEN COL AND RPT DATE: 12/8/2016 3:33  PM EST    VITAL SIGNS  PULSE:  B/P:  WEIGHT:  HEIGHT:  TEMP:     CURRENT ANTICOAGULANT DOSING SCHEDULE  DOSE SIZE: 5mg    ANTICOAGULANT TYPE: WARFARIN  DOSING REGIMEN  Sun       Mon       Tues      Wed       Thurs     Fri       Sat  Total/Wk  5         5         5         5         5         5         5         35    PATIENT MEDICATION INSTRUCTION: Yes  PATIENT NUTRITIONAL COUNSELING: No  PATIENT BRUISING INSTRUCTION: No      LAST EDUCATION DATE:       PREVIOUS VISIT INFORMATION  VISITDATE   INR Goal  INR   Sun     Mon     Tues    Wed     Thurs   Fri  Sat     Total/wk  12/8/2016   3         2 73  5       5       5       5       5       5  5       35  11/29/2016  3         3 9   5       5       HOLD    5       5       5  5       30  11/2/2016   3         3 41  5       5       5       5       5       5  5       35  10/5/2016   3         3 4   5       5       5       5       5       5  5       35    ADDITIONAL PREVIOUS VISIT INFORMATION  VISITDATE   PRIMARY RX               DOSE      CrCl  12/8/2016   WARFARIN                 5mg                 11/29/2016  WARFARIN 5mg                 11/2/2016   WARFARIN                 5mg                 10/5/2016   WARFARIN                 5mg                     OTHER CURRENT MEDICATIONS: WARFARIN, WARFARIN      PROGRESS NOTES:     PATIENT INSTRUCTIONS: 12/8/16, tc pt, cont 5 mg daily, rech 2 weeks, 12/22  bharathi  TEST LOCATION: [facility]    Electronically signed by:  Sergo Robert on 12/8/2016 at 3:33 PM EST

## 2018-01-15 VITALS
DIASTOLIC BLOOD PRESSURE: 60 MMHG | WEIGHT: 163.13 LBS | HEART RATE: 74 BPM | BODY MASS INDEX: 27.85 KG/M2 | SYSTOLIC BLOOD PRESSURE: 118 MMHG | HEIGHT: 64 IN

## 2018-01-15 NOTE — PROGRESS NOTES
REPORT NAME: Patient Visit Summary Report   VISIT DATE: 11/2/2016  VISIT TIME: 4:17 PM EDT  PATIENT NAME: Ata Herrera  MEDICAL RECORD NUMBER: [id number]  SOCIAL SECURITY NUMBER:   YOB: 1934  AGE: 80  REFERRING PHYSICIAN: Mejia Presley  SUPERVISING CLINICIAN: [address] CARE PROFESSIONAL: Boni Nixon  PATIENT HOME ADDRESS: [place], [place], [address] [place], [place] [address]  PATIENT HOME PHONE: [phone]  DIAGNOSIS 1: Mitral Valve Replacement / 424 0  DIAGNOSIS 2:   DIAGNOSIS 3:   DIAGNOSIS 4:   INR RANGE: 2 5 - 3 5  INR GOAL: 3  TREATMENT START DATE:   TREATMENT END DATE:   NEXT VISIT: 11/30/2016  [facility]    VISIT RESULTS   ENCOUNTER NUMBER:   TEST LOCATION: [facility]  TEST TYPE: Outside Lab (Venipuncture)  VISIT TYPE: Phone Consult  CURRENT INR: 3 41 PROTIME: 33 7  SPECIMEN COL AND RPT DATE: 11/2/2016 4:17  PM EDT    VITAL SIGNS  PULSE:  B/P:  WEIGHT:  HEIGHT:  TEMP:     CURRENT ANTICOAGULANT DOSING SCHEDULE  DOSE SIZE: 5mg    ANTICOAGULANT TYPE: WARFARIN  DOSING REGIMEN  Sun       Mon       Tues      Wed       Thurs     Fri       Sat  Total/Wk  5         5         5         5         5         5         5         35    PATIENT MEDICATION INSTRUCTION: Yes  PATIENT NUTRITIONAL COUNSELING: No  PATIENT BRUISING INSTRUCTION: No      LAST EDUCATION DATE:       PREVIOUS VISIT INFORMATION  VISITDATE   INR Goal  INR   Sun     Mon     Tues    Wed     Thurs   Fri  Sat     Total/wk  11/2/2016   3         3 41  5       5       5       5       5       5  5       35  10/5/2016   3         3 4   5       5       5       5       5       5  5       35  9/7/2016    3         3 44  5       5       5       5       5       5  5       35  8/9/2016    3         3 08  5       5       5       5       5       5  5       35    ADDITIONAL PREVIOUS VISIT INFORMATION  VISITDATE   PRIMARY RX               DOSE      CrCl  11/2/2016   WARFARIN                 5mg                 10/5/2016   WARFARIN 5mg                 9/7/2016    WARFARIN                 5mg                 8/9/2016    WARFARIN                 5mg                     OTHER CURRENT MEDICATIONS: WARFARIN, WARFARIN      PROGRESS NOTES:     PATIENT INSTRUCTIONS: 11/2/16, tc pt, lm am, cont current dose, rech 4  weeks, 11/30  bharathi  TEST LOCATION: [facility]    Electronically signed by:  Samantha Robert on 11/2/2016 at 4:17 PM EDT

## 2018-01-15 NOTE — MISCELLANEOUS
Message   Recorded as Task   Date: 06/07/2016 10:13 AM, Created By: Laila Renteria   Task Name: Call Back   Assigned To: [id number] [address] clinical,Team   Regarding Patient: Efrain Anderson, Status: In Progress   Comment:    Divya Yeager - 07 Jun 2016 10:13 AM     TASK CREATED    We received auth for pt to hold her Coumadin, also Lovenox bridging, dated 6/3/16  A calendar was sent with written instructions for pt  Looks like pt was already scheduled for 6/22/16  LMOM for pt to CB to go over all instructions  ***********   Divya Yeager - 07 Jun 2016 10:13 AM     TASK IN PROGRESS   Divya Yeager - 07 Jun 2016 3:13 PM     TASK EDITED    I spoke with pt, advised that we did get auth  Pt states she did not get instructions on Lovenox yet  Advised her to follow calendar and hold Coumadin after 6/17/16  Pt to be NPO 1 hr prior, pt to have , pt will call if she becomes ill or goes on antibx  Pt verbalizes understanding  ************        Active Problems    1  Analgesic use (V58 69) (Z79 899)   2  Anticoagulated by anticoagulation treatment (V58 61) (Z79 01)   3  Arthritis (716 90) (M19 90)   4  Atrial fibrillation (427 31) (I48 91)   5  Backache (724 5) (M54 9)   6  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)   7  Burning sensation (782 0) (R20 8)   8  Coagulation defect (286 9) (D68 9)   9  Current use of long term anticoagulation (V58 61) (Z79 01)   10  Disc degeneration, 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)   11  Encounter for Medicare annual wellness exam (V70 0) (Z00 00)   12  Encounter for screening colonoscopy (V76 51) (Z12 11)   13  Encounter for screening mammogram for malignant neoplasm of breast (V76 12)    (Z12 31)   14  Hematuria (599 70) (R31 9)   15  Hyperlipidemia (272 4) (E78 5)   16  Hypertension (401 9) (I10)   17  Limb pain (729 5) (M79 609)   18  Limb Weakness (Paresis) (728 87)   19  Low back pain (724 2) (M54 5)   20  Lumbar radiculopathy (724 4) (M54 16)   21   Lumbar stenosis with neurogenic claudication (724 03) (M48 06)   22  History of Mitral Valve Replacement   23  Myofascial pain syndrome (729 1) (M79 1)   24  Osteopenia (733 90) (M85 80)   25  Pain syndrome, chronic (338 4) (G89 4)   26  Sacroiliitis (720 2) (M46 1)   27  Senile purpura (287 2) (D69 2)   28  Spinal stenosis (724 00) (M48 00)   29  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 myelopathy or radiculopathy (721 3)    (M47 817)   30  Tendonitis Of The Left Shoulder (726 10)   31  Type 2 diabetes mellitus (250 00) (E11 9)   32  Visit for screening mammogram (V76 12) (Z12 31)   33  Vitamin D deficiency (268 9) (E55 9)    Current Meds   1  Atenolol 50 MG Oral Tablet; take one tablet by mouth every day; Therapy: 78PZK5400 to [phone])  Requested for: 12UYK5265; Last   Rx:67Xnt2048 Ordered   2  Enoxaparin Sodium 80 MG/0 8ML Subcutaneous Solution (Lovenox); take 80 mg every   12 hours as directed by MD;   Therapy: 08MHB5405 to (Evaluate:10Jun2016); Last Rx:03Jun2016 Ordered   3  Gabapentin 300 MG Oral Capsule; TAKE 2 CAPSULES BY MOUTH 3 TIMES A DAY; Therapy: 50UFH0060 to (Evaluate:77Xyv7593)  Requested for: 93TFX0945; Last   Rx:46Rmb2692 Ordered   4  Losartan Potassium-HCTZ 100-12 5 MG Oral Tablet; TAKE 1 TABLET DAILY; Therapy: 80UOJ4225 to (FKIEWCON:80JTI5862)  Requested for: 10VVK9216; Last   Rx:05Vdc1403 Ordered   5  MetFORMIN HCl  MG Oral Tablet Extended Release 24 Hour; take one tablet by   mouth every day; Therapy: 41EUQ9553 to (GTYFARSP:22WCF2890)  Requested for: 35QSZ2370; Last   Rx:15Nat0170 Ordered   6  Pravastatin Sodium 40 MG Oral Tablet; Take 1 tablet daily; Therapy: 26KPB0089 to (Evaluate:73Dyc8011)  Requested for: 60DXZ5418; Last   Rx:83Jgn3555 Ordered   7  TraMADol HCl - 50 MG Oral Tablet; Take 1 tablet 3 times daily as needed; Therapy: 40BRI3901 to (Bethanie Iqbal)  Requested for: 97CLS1617; Last   Rx:43Dus6760 Ordered   8   Warfarin Sodium 5 MG Oral Tablet; TAKE 1 TABLET DAILY AS DIRECTED; Therapy: 92VLO0516 to [phone])  Requested for: 69HSF8680; Last   Rx:56Adi0183 Ordered    Allergies    1  Lisinopril TABS    Signatures   Electronically signed by :  Pete Johnson, ; Jun 7 2016  3:13PM EST                       (Author)

## 2018-01-15 NOTE — MISCELLANEOUS
Message   Recorded as Task   Date: 02/01/2016 09:42 AM, Created By: Sharyle Pilsner   Task Name: Follow Up   Assigned To: [id number] [address] clinical,Team   Regarding Patient: Elkin Cody, Status: In Progress   Comment:    Divya Yeager - 01 Feb 2016 9:42 AM     TASK CREATED  Caller: Self; General Medical Question; [phone] (Home)    *****Dr Arash Wong pt*******  Pt called, states she is having a lot of pain in ther right buttock and down her right leg  States she has had this pain but has been dealing with it  States she just got new insurance and it is active starting today  States she cannot get an appt till 2/10/16  Asking what she can do  States she is taking Tramadol 50 mg 2-3x/day  and Gabapentin 300 mg 6/day  Pt states she was going to have SCS trial but her cardiologist would not give clearance for her to hold her Coumadin  Pt last OV 12/16/15    Advised that I will discuss with DR Jill Haro and CB with any recommendations  ************   Sujatha Costello - 01 Feb 2016 5:08 PM     TASK REPLIED TO: Previously Assigned To [facility] clinical,Team  I apologize, I did not see this  If she is allowed to, she can add tylenol to the tramadol  Divya Yeager - 45 Feb 2016 9:14 AM     TASK EDITED    Attempted to contact pt, LMOM for pt to [facility]  ***********   Divya Yeager - 02 Feb 2016 2:14 PM     TASK EDITED    i spoke with pt, advised to add tylenol, and we can eval at OV  Pt scheduled for 2/10/16 at 1045 with Samir Dumont  Pt thankful  **************        Active Problems    1  Analgesic use (V58 69) (Z79 899)   2  Anticoagulated by anticoagulation treatment (V58 61) (Z79 01)   3  Arthritis (716 90) (M19 90)   4  Atrial fibrillation (427 31) (I48 91)   5  Backache (724 5) (M54 9)   6  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)   7  Burning sensation (782 0) (R20 8)   8  Coagulation defect (286 9) (D68 9)   9  Current use of long term anticoagulation (V58 61) (Z79 01)   10   Disc degeneration, 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)   11  Encounter for Medicare annual wellness exam (V70 0) (Z00 00)   12  Encounter for screening colonoscopy (V76 51) (Z12 11)   13  Encounter for screening mammogram for malignant neoplasm of breast (V76 12)    (Z12 31)   14  Hematuria (599 70) (R31 9)   15  Hyperlipidemia (272 4) (E78 5)   16  Hypertension (401 9) (I10)   17  Limb pain (729 5) (M79 609)   18  Limb Weakness (Paresis) (728 87)   19  Low back pain (724 2) (M54 5)   20  Lumbar radiculopathy (724 4) (M54 16)   21  Lumbar stenosis with neurogenic claudication (724 03) (M48 06)   22  History of Mitral Valve Replacement   23  Myofascial pain syndrome (729 1) (M79 1)   24  Osteopenia (733 90) (M85 80)   25  Pain syndrome, chronic (338 4) (G89 4)   26  Sacroiliitis (720 2) (M46 1)   27  Senile purpura (287 2) (D69 2)   28  Spinal stenosis (724 00) (M48 00)   29  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 myelopathy or radiculopathy (721 3)    (M47 817)   30  Tendonitis Of The Left Shoulder (726 10)   31  Type 2 diabetes mellitus (250 00) (E11 9)   32  Visit for screening mammogram (V76 12) (Z12 31)   33  Vitamin D deficiency (268 9) (E55 9)    Current Meds   1  Atenolol 50 MG Oral Tablet; take one tablet by mouth every day; Therapy: 44VZG7704 to (Evaluate:05Jan2017)  Requested for: 46GDH1663; Last   Rx:11Jan2016 Ordered   2  Gabapentin 300 MG Oral Capsule; TAKE 2 CAPSULES BY MOUTH 3 TIMES A DAY; Therapy: 46NVM2270 to (Evaluate:07Jbt3114)  Requested for: 99BZQ5195; Last   Rx:62Gez4223 Ordered   3  Losartan Potassium-HCTZ 100-12 5 MG Oral Tablet; TAKE 1 TABLET DAILY; Therapy: 35CXX8295 to (Nona Dalton)  Requested for: 47Skt8302; Last   Rx:38Ubq2398 Ordered   4  MetFORMIN HCl  MG Oral Tablet Extended Release 24 Hour; take one tablet by   mouth every day; Therapy: 14HYQ4358 to (Evaluate:38Zaf1559)  Requested for: 17IFV7365; Last   Rx:80Kmv0194 Ordered   5  Pravastatin Sodium 40 MG Oral Tablet; Take 1 tablet daily;    Therapy: 32MBQ3030 to (Vladimir Fisher)  Requested for: 08Sep2015; Last   Rx:08Sep2015 Ordered   6  TraMADol HCl - 50 MG Oral Tablet; Take 1 tablet 3 times daily as needed; Therapy: 08VHD7004 to (Evaluate:21Mar2016)  Requested for: 21Jan2016; Last   Rx:21Jan2016 Ordered   7  Warfarin Sodium 5 MG Oral Tablet; TAKE 1 TABLET DAILY AS DIRECTED; Therapy: 26IZC2289 to (Vladimir Fisher)  Requested for: 08Sep2015; Last   Rx:08Sep2015 Ordered    Allergies    1  Lisinopril TABS    Signatures   Electronically signed by :  [facility], ; Feb 2 2016  2:14PM EST                       (Author)

## 2018-01-15 NOTE — PROGRESS NOTES
Assessment    1  Chronic bilateral low back pain (724 2,338 29) (M54 5,G89 29)   2  Disc degeneration, 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)   3  Chronic lumbar radiculopathy (724 4) (M54 16)   4  Pain syndrome, chronic (338 4) (G89 4)   5  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 myelopathy or radiculopathy (721 3)   (M47 817)    Plan   Lumbar stenosis with neurogenic claudication    · Gabapentin 300 MG Oral Capsule; Take 2 capsules three times daily   Rx By: Oneyda Campos; Dispense: 60 Days ; #:360 Capsule; Refill: 1; For: Lumbar stenosis with neurogenic claudication; ROBBIN = N; [facility]; Last Updated By: System, [employer]; 8/10/2016 4:26:43 PM    TraMADol HCl - 50 MG Oral Tablet; Take 1 tablet 3 times daily as needed; Therapy: 56ZLI5317 to (Evaluate:60Rjo1873)  Requested for: 10Aug2016; Last Rx:29Jun2016; Status: ACTIVE Ordered  Rx By: Rubén Howell; Dispense: 90 Days ; #:270 Tablet; Refill: 0;   For: Chronic bilateral low back pain; ROBBIN = N; Print Rx; Last Updated By: Oneyda Campos; 8/11/2016 11:54:56 AM     Annotations              Pt aware as per MS pt is to get Rx for Tramadol from Dr Dorothy Hawkins @ [facility], Rx destroyed  Faxed to Franco once signed by VALORIE  2/29/16 kw  Follow-up visit in 3 months Evaluation and Treatment  Follow-up  Status: Hold For - Scheduling  Requested for: 10Aug2016  Ordered; For: Pain syndrome, chronic;  Ordered By: Oneyda Campos  Performed:   Due: 58TDO8511     Discussion/Summary    I discussed with the patient at this point time since I feel that her medication regimen is quite reasonable and appropriate and it appears that she is using medication appropriately in her baseline drug screen in 2015 was appropriate, that I would feel comfortable and it would be appropriate for our office take over prescribing her medication regimen   However, with regards to the tramadol the patient is going to go home tonight and take a look at the bottle to see where she got the prescription from and how many pills she was given  This will help us know how to get her the prescription she needs  However, explained to the patient that we would be happy to send her tramadol prescription into [facility], however, with this type of medication we do not prescribe 90 day prescriptions and I would send a 30 day prescription with 2 refills  The patient was agreeable and verbalized an understanding  I advised the patient that she should continue with the gabapentin and I sent a refill for 90 days to [facility] with another refill  The patient was agreeable and verbalized an understanding  While the patient was in the office today, an annual review of the opioid contract/agreement was conducted, the patient was agreeable to continuing the contract as previously agreed upon for this calendar year  The patient is to schedule a follow-up office visit in 3 months and at that point time we will regroup with regards to her medication regimen and treatment plan  The patient was agreeable and verbalized an understanding  Possible side effects of new medications were reviewed with the patient/guardian today  The treatment plan was reviewed with the patient/guardian  The patient/guardian understands and agrees with the treatment plan   The patient was counseled regarding instructions for management, prognosis, patient and family education, risks and benefits of treatment options and importance of compliance with treatment  total time of encounter was 20 minutes  Chief Complaint    1  Back Pain  Chronic right sided low back and leg pain, stable  History of Present Illness  The patient presents today for follow-up office visit and medication refill   The patient is currently being treated for her right-sided low back pain and lower extremity radicular symptoms, which she feels has actually improved since her epidural steroid injection with Dr Benjie Beasley in June  She states that at this point she has accepted the fact that she is going to have to live with chronic pain the rest of her life and that the tubing injections and the medications she feels she is able to provide at least 80% reduction of her pain and that it is at least manageable and tolerable  The patient presents today to discuss our office a fissure taking over prescribing her pain medication as she had previously signed a contract and give a baseline drug screen earlier in the year, however, did not need to follow-up for prescriptions because her primary care provider was prescribing prescriptions  However, her previous primary care provider retired and her current primary care provider is not comfortable prescribing the tramadol as it is currently prescribed 50 mg 1 tablet every 8 hours as needed the patient reports that some days she takes one Sunday she takes Neurontin some days she has to take all 3  However, she typically tries not to take the medication if she does not have to and feels that it does provide stable relief, without any side effects or issues  He reports that she currently is also taking the gabapentin and again between the injections and her medications is noting moderate significant relief without side effects  The patient reports that she still has gabapentin and tramadol at home, however, was unclear as to whether or not the tramadol was a 90 day prescription for 30 days prescription and whether she got at the local pharmacy or through [facility] her mail order  She did report that she does need another prescription for her gabapentin sent as they just then her last refill  Karleneghanshyam Oh presents with complaints of gradual onset of intermittent episodes of mild back pain, described as dull and aching, radiating to the right buttock, right thigh and right lower leg  On a scale of 1 to 10, the patient rates the pain as 2  Symptoms are improving        Review of Systems    Constitutional: no fever, no recent weight gain and no recent weight loss  Eyes: no double vision and no blurry vision  Cardiovascular: no chest pain, no palpitations and no lower extremity edema  Respiratory: no complaints of shortness of breath and no wheezing  Musculoskeletal: joint stiffness and limb swelling right foot, but no difficulty walking, no muscle weakness, no joint swelling, no pain in extremity and no decreased range of motion  Neurological: no dizziness, no difficulty swallowing, no memory loss, no loss of consciousness and no seizures  Gastrointestinal: constipation, but no nausea, no vomiting and no diarrhea  Genitourinary: no difficulty initiating urine stream, no genital pain and no frequent urination  Integumentary: no complaints of skin rash  Psychiatric: no depression  Endocrine: no excessive thirst, no adrenal disease, no hypothyroidism and no hyperthyroidism  Hematologic/Lymphatic: no tendency for easy bruising and no tendency for easy bleeding  Active Problems    1  Analgesic use (V58 69) (Z79 899)   2  Anticoagulated by anticoagulation treatment (V58 61) (Z79 01)   3  Arthritis (716 90) (M19 90)   4  Atrial fibrillation (427 31) (I48 91)   5  Backache (724 5) (M54 9)   6  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)   7  Burning sensation (782 0) (R20 8)   8  Chronic bilateral low back pain (724 2,338 29) (M54 5,G89 29)   9  Chronic lumbar radiculopathy (724 4) (M54 16)   10  Coagulation defect (286 9) (D68 9)   11  Current use of long term anticoagulation (V58 61) (Z79 01)   12  Disc degeneration, 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)   13  Encounter for Medicare annual wellness exam (V70 0) (Z00 00)   14  Encounter for screening colonoscopy (V76 51) (Z12 11)   15  Encounter for screening mammogram for malignant neoplasm of breast (V76 12)    (Z12 31)   16  H/O mitral valve replacement with mechanical valve (V43 3) (Z95 2)   17  Hematuria (599 70) (R31 9)   18  Hyperlipidemia (272 4) (E78 5)   19  Hypertension (401 9) (I10)   20  Limb pain (729 5) (M79 609)   21  Limb Weakness (Paresis) (728 87)   22  Lumbar stenosis with neurogenic claudication (724 03) (M48 06)   23  History of Mitral Valve Replacement   24  Myofascial pain syndrome (729 1) (M79 1)   25  Osteopenia (733 90) (M85 80)   26  Pain syndrome, chronic (338 4) (G89 4)   27  Sacroiliitis (720 2) (M46 1)   28  Senile purpura (287 2) (D69 2)   29  Spinal stenosis (724 00) (M48 00)   30  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 myelopathy or radiculopathy (721 3)    (M47 817)   31  Tendonitis Of The Left Shoulder (726 10)   32  Type 2 diabetes mellitus (250 00) (E11 9)   33  Visit for screening mammogram (V76 12) (Z12 31)   34  Vitamin D deficiency (268 9) (E55 9)    Past Medical History    1  History of Arthritis (V13 4)   2  History of Benign Polyps Of The Large Intestine (V12 72)   3  Chronic bilateral low back pain (724 2,338 29) (M54 5,G89 29)   4  History of endocarditis (V12 59) (Z86 79)   5  History of hypertension (V12 59) (Z86 79)   6  History of Stroke Syndrome (436)   7  History of Stroke Syndrome (436)    The active problems and past medical history were reviewed and updated today  Surgical History    1  History of Cataract Surgery   2  History of Gallbladder Surgery   3  History of Hemilaminect Decompress Part Facetectomy 1 Lumbar Interspace   4  History of Mitral Valve Replacement   5  History of Neuroplasty Decompression Median Nerve At Carpal Tunnel   6  History of Rotator Cuff Repair    The surgical history was reviewed and updated today  Family History  Mother    1  Family history of Disorders Of Blood And Blood-forming Organs (V18 3)  Father    2  Family history of Acute Myocardial Infarction (V17 3)  Maternal Grandfather    3  Family history of Cancer  Paternal Grandfather    4  Family history of Heart Disease (V17 49)  Family History    5   Family history of Diabetes Mellitus (V18 0)   6  Family history of Family Health Status 2  Children Living   7  Family history of Family Health Status Siblings 5  Living   8  Family history of Maternal Grandfather Is    5  Family history of Maternal Grandmother Is    8  Family history of Paternal Grandfather Is    6  Family history of Paternal Grandmother Is     The family history was reviewed and updated today  Social History    · Denied: History of Alcohol Use (History)   · Denied: History of Drug Use   · Marital History -    · Never a smoker   · Occupation: Retired   · Retired From Work  The social history was reviewed and updated today  The social history was reviewed and is unchanged  Current Meds   1  Atenolol 50 MG Oral Tablet; take one tablet by mouth every day; Therapy: 31BAF1531 to (0474 77 19 07)  Requested for: 08CAA8859; Last   Rx:49Pft7389 Ordered   2  Gabapentin 300 MG Oral Capsule; Take 2 capsules three times daily; Therapy: 40CND9683 to (Evaluate:2016)  Requested for: 00ELW9321; Last   Rx:38Ahh2866 Ordered   3  Losartan Potassium-HCTZ 100-12 5 MG Oral Tablet; TAKE 1 TABLET DAILY; Therapy: 72SRE6671 to (XYFXWLDM:94RWB1009)  Requested for: 59KEH1595; Last   Rx:56Owq2436 Ordered   4  MetFORMIN HCl  MG Oral Tablet Extended Release 24 Hour; take one tablet by   mouth every day; Therapy: 62TMV4271 to (NGLGYLSD:43XYZ0365)  Requested for: 44WHP8004; Last   Rx:27Xpx7625 Ordered   5  Pravastatin Sodium 40 MG Oral Tablet; Take 1 tablet daily; Therapy: 16BBL3818 to (Evaluate:81Fnw2532)  Requested for: 51OHO1615; Last   Rx:60Xsw6079 Ordered   6  TraMADol HCl - 50 MG Oral Tablet; Take 1 tablet 3 times daily as needed; Therapy: 45GXM7692 to (Evaluate:16Ujk9859)  Requested for: 47OXG7420; Last   Rx:2016 Ordered   7  Warfarin Sodium 5 MG Oral Tablet; TAKE 1 TABLET DAILY AS DIRECTED;    Therapy: 71PXC2109 to )  Requested for: 26MZY8349; Last Rx:57Osw1614 Ordered    The medication list was reviewed and updated today  Allergies    1  Lisinopril TABS    Vitals  Vital Signs    Recorded: 49HUF5763 24:29MA   Systolic 871   Diastolic 72   Heart Rate 80   Respiration 14   Temperature 98 2 F   Pain Scale 2   Height 5 ft 4 in   Weight 170 lb    BMI Calculated 29 18   BSA Calculated 1 84     Physical Exam    Constitutional   General appearance: Well developed, well nourished, alert, in no distress, non-toxic and no overt pain behavior  Eyes   Sclera: anicteric   HEENT   Hearing grossly intact  Pulmonary   Respiratory effort: Even and unlabored  Cardiovascular   Examination of extremities: No edema or pitting edema present  Abdomen   Abdomen: Soft, non-tender, non-distended  Skin   Skin and subcutaneous tissue: Normal without rashes or lesions, well hydrated  Psychiatric   Mood and affect: Mood and affect appropriate  Neurologic Motor Tone:    Cranial nerves: Cranial nerves II-XII grossly intact  Slightly antalgic, but steady gait with the use of a single cane  Musculoskeletal       Results/Data  Procedure Flowsheet 08KCT9707 03:54PM      Test Name Result Flag Reference   Oswestry Score 32       Results Free Text Form Pain [facility]:   Results     Other  Pt  reports she ran out of tramadol  Future Appointments    Date/Time Provider Specialty Site   11/02/2016 11:15 AM PHILIP Camilo [facility]   01/04/2017 10:00 AM Tushar Judge DO Cardiology  CARDIOLOGY  [place]   09/20/2016 09:00 AM Yarelis Us, [facility]     Signatures   Electronically signed by : PHILIP Guillory;  Aug 10 2016  8:58PM EST                       (Author)    Electronically signed by : Luigi Torres DO; Aug 11 2016  3:45PM EST

## 2018-01-15 NOTE — PROGRESS NOTES
REPORT NAME: Patient Visit Summary Report   VISIT DATE: 2/14/2017  VISIT TIME: 4:32 PM EST  PATIENT NAME: Carly Lazo  MEDICAL RECORD NUMBER: [id number]  SOCIAL SECURITY NUMBER:   YOB: 1934  AGE: 80  REFERRING PHYSICIAN: Morgan Eduardo  SUPERVISING CLINICIAN: [address] CARE PROFESSIONAL: Angel DOWELL [facility]  PATIENT HOME ADDRESS: [address], [address] [place], [place] [address]  PATIENT HOME PHONE: [phone]  DIAGNOSIS 1: Mitral Valve Replacement / 424 0  DIAGNOSIS 2:   DIAGNOSIS 3:   DIAGNOSIS 4:   INR RANGE: 2 5 - 3 5  INR GOAL: 3  TREATMENT START DATE:   TREATMENT END DATE:   NEXT VISIT: 2/17/2017  [facility]    VISIT RESULTS   ENCOUNTER NUMBER:   TEST LOCATION: [facility]  TEST TYPE: Outside Lab (Venipuncture)  VISIT TYPE: Phone Consult  CURRENT INR: 1 47 PROTIME: 17 8  SPECIMEN COL AND RPT DATE: 2/14/2017 4:32  PM EST    VITAL SIGNS  PULSE:  B/P:  WEIGHT:  HEIGHT:  TEMP:     CURRENT ANTICOAGULANT DOSING SCHEDULE  DOSE SIZE: 5mg    ANTICOAGULANT TYPE: WARFARIN  DOSING REGIMEN  Sun       Mon       Tues      Wed       Thurs     Fri       Sat  Total/Wk  5         5         7 5       5         5         2 5       2 5       32 5    PATIENT MEDICATION INSTRUCTION: Yes  PATIENT NUTRITIONAL COUNSELING: No  PATIENT BRUISING INSTRUCTION: No      LAST EDUCATION DATE:       PREVIOUS VISIT INFORMATION  VISITDATE   INR Goal  INR   Sun     Mon Tues Wed Thurs Fri  Sat     Total/wk  2/14/2017   3         1 47  5       5       7 5     5       5       2 5  2 5     32 5  2/10/2017   3         2 87  5       5       0       0       0       2 5  2 5     15  2/8/2017    3         5 59  0       0       0       HOLD    HOLD    0  0       0  1/25/2017   3         2 26  5       5       5       5       5       5  5       35    ADDITIONAL PREVIOUS VISIT INFORMATION  VISITDATE   PRIMARY RX               DOSE      CrCl  2/14/2017   WARFARIN                 5mg                 2/10/2017   WARFARIN 5mg                 2/8/2017    WARFARIN                 5mg                 1/25/2017   WARFARIN                 5mg                     OTHER CURRENT MEDICATIONS: WARFARIN, WARFARIN      PROGRESS NOTES:     PATIENT INSTRUCTIONS: 2/14/17, tc pt, 7 5 mg today, then 5 mg w/th, inr  fri, 2/17  bharathi  TEST LOCATION: [facility]    Electronically signed by:  Romana Pih Perinotti on 2/14/2017 at 4:32 PM EST

## 2018-01-15 NOTE — RESULT NOTES
Message   Recorded as Task   Date: 04/19/2016 10:43 AM, Created By: Shahriar Hood   Task Name: Follow Up   Assigned To: [id number] [address] clinical,Team   Regarding Patient: Griselda Hamilton, Status: In Progress   Comment:    Odilon Elizabeth - 19 Apr 2016 10:43 AM     TASK CREATED  please schedule MBB#2   Divya Yeager - 19 Apr 2016 2:31 PM     TASK REPLIED TO: Previously Assigned To [id number] [address] clinical,Team    Attempted to contact pt, LMOM for pt to CB  Needs to scheduleRT L3-4 L4-5  L5-1 MBB #2/ELMO  Divya Yeager - 19 Apr 2016 2:31 PM     TASK IN PROGRESS   Divya Yeager - 19 Apr 2016 3:06 PM     TASK EDITED    I spoke with pt, advised ok to schedule repeat MBB  Scheduled RT L3-4,L4-5, L5-1 MBB #2 for 5/2/16 at 1300  Pt will have PT-INR drawn the day of the procedure  Script to be sent to pt home  Pt will have , be NPO 1 hr prior, pt will call if she becomes ill, go on antibx or develops infection  Pt verbalizes understanding  *******   Divya Yeager - 19 Apr 2016 3:19 PM     TASK EDITED    I also advised pt she must have pain level 5 or greater on the day of the procedure, and not to take pain med the day of procedure  I mailed script for PT INR to pt home  ***************        Plan  Anticoagulated by anticoagulation treatment    · (1) PT WITH INR; Status:Active - Retrospective Authorization; Requested for:19Apr2016;     Signatures   Electronically signed by :  Jeffery Valenzuela, ; Apr 19 2016  3:20PM EST                       (Author)

## 2018-01-15 NOTE — PROGRESS NOTES
REPORT NAME: Patient Visit Summary Report   VISIT DATE: 3/9/2017  VISIT TIME: 3:54 PM EST  PATIENT NAME: Rama Parr  MEDICAL RECORD NUMBER: [id number]  SOCIAL SECURITY NUMBER:   YOB: 1934  AGE: 80  REFERRING PHYSICIAN: Leopoldo Pearce  SUPERVISING CLINICIAN: [address] CARE PROFESSIONAL: Queen Jeff Nixon  PATIENT HOME ADDRESS: [address], [address] [place], [place] [address]  PATIENT HOME PHONE: [phone]  DIAGNOSIS 1: Mitral Valve Replacement / 424 0  DIAGNOSIS 2:   DIAGNOSIS 3:   DIAGNOSIS 4:   INR RANGE: 2 5 - 3 5  INR GOAL: 3  TREATMENT START DATE:   TREATMENT END DATE:   NEXT VISIT: 3/20/2017  [facility]    VISIT RESULTS   ENCOUNTER NUMBER:   TEST LOCATION: [facility]  TEST TYPE: Outside Lab (Venipuncture)  VISIT TYPE: Phone Consult  CURRENT INR: 2 63 PROTIME: 27 7  SPECIMEN COL AND RPT DATE: 3/9/2017 3:54  PM EST    VITAL SIGNS  PULSE:  B/P:  WEIGHT:  HEIGHT:  TEMP:     CURRENT ANTICOAGULANT DOSING SCHEDULE  DOSE SIZE: 5mg    ANTICOAGULANT TYPE: WARFARIN  DOSING REGIMEN  Sun       Mon       Tues      Wed       Thurs     Fri       Sat  Total/Wk  5         5         5         5         5         5         2 5       32 5    PATIENT MEDICATION INSTRUCTION: Yes  PATIENT NUTRITIONAL COUNSELING: No  PATIENT BRUISING INSTRUCTION: No      LAST EDUCATION DATE:       PREVIOUS VISIT INFORMATION  VISITDATE   INR Goal  INR   Sun     Mon     Tues    Wed     Thurs   Fri  Sat     Total/wk  3/9/2017    3         2 63  5       5       5       5       5       5  2 5     32 5  2/24/2017   3         2 71  5       5       5       5       5       5  2 5     32 5  2/17/2017   3         1 91  5       5       5       5       5       5  2 5     32 5  2/14/2017   3         1 47  5       5       7 5     5       5       2 5  2 5     32 5    ADDITIONAL PREVIOUS VISIT INFORMATION  VISITDATE   PRIMARY RX               DOSE      CrCl  3/9/2017    WARFARIN                 5mg                 2/24/2017   WARFARIN 5mg                 2/17/2017   WARFARIN                 5mg                 2/14/2017   WARFARIN                 5mg                     OTHER CURRENT MEDICATIONS: WARFARIN, WARFARIN      PROGRESS NOTES:     PATIENT INSTRUCTIONS: 3/9/17, tc pt, cont current dose, will address  lovenox bridging instructions with dr Terrence Washburn, 3/10  hernando   ---- Additional Instructions entered on 3/10/2017 12:25 PM EST by Kylah Robert :  3/10/17  SEE CHART FOR LOVENOX INSTRUCTIONS  PT WILL STOP  WARFARIN 3/12  WILL HAVE INR DONE 3/16  SURGERY 3/17  HERNANDO  TEST LOCATION: [facility]    Electronically signed by:  Kylah Robert on 3/10/2017 at 12:25 PM EST

## 2018-01-15 NOTE — PROGRESS NOTES
REPORT NAME: Progress Notes Report  VISIT DATE: 6/21/2017  VISIT TIME: 4:24 PM EDT  PATIENT NAME: Afshan Campbell  MEDICAL RECORD NUMBER: [id number]  YOB: 1934  AGE: 80  REFERRING PHYSICIAN: Yomi Sandoval  SUPERVISING CLINICIAN: [address] [facility] CARE PROVIDER: Aliza Nixon  PATIENT HOME ADDRESS: [address], [facility] [address]  PATIENT HOME PHONE: [phone]  SOCIAL SECURITY NUMBER:   DIAGNOSIS 1: Mitral Valve Replacement / 424 0  DIAGNOSIS 2:   INR RANGE: 2 5 - 3 5  INR GOAL: 3  TREATMENT START DATE:   TREATMENT END DATE:   NEXT VISIT: 6/28/2017  [facility]  VISIT RESULTS  ENCOUNTER NUMBER:   TEST LOCATION: [facility]  TEST TYPE: Outside Lab (Venipuncture)  VISIT TYPE: Phone Consult  CURRENT INR: 1 65 PROTIME: 19 6  SPECIMEN COL AND RPT DATE: 6/21/2017 4:24  PM EDT  VITAL SIGNS  PULSE:  BP: / WEIGHT:  HEIGHT:  TEMP:   CURRENT ANTICOAGULANT DOSING SCHEDULE  DOSE SIZE: 5mg    ANTICOAGULANT TYPE: WARFARIN  DOSING REGIMEN  Sun       Mon       Tues      Wed       Thurs     Fri       Sat  Total/Wk  5         5         5         7 5       5         5         5         37 5  PATIENT MEDICATION INSTRUCTION: Yes  PATIENT NUTRITIONAL COUNSELING: No  PATIENT BRUISING INSTRUCTION: No  LAST EDUCATION DATE:   PREVIOUS VISIT INFORMATION  VISITDATE  INRGoal INR   Sun    Mon    Tues   Wed    Thurs  Fri    Sat  Total/wk  6/21/2017   3       1 65  5      5      5      7 5    5      5      5  37 5  6/14/2017   3       2 18  2 5    5      5      5      5      5      5  32 5  6/12/2017   3       5 6   0      HOLD   HOLD   0      0      0      0  0  5/24/2017   3       2 24  5      5      5      5      5      5      5  35  ADDITIONAL PREVIOUS VISIT INFORMATION  VISITDATE   PRIMARY RX               DOSE      CrCl  6/21/2017   WARFARIN                 5mg  6/14/2017   WARFARIN                 5mg  6/12/2017   WARFARIN                 5mg  5/24/2017   WARFARIN                 5mg  OTHER CURRENT MEDICATIONS:  WARFARIN, WARFARIN  PROGRESS NOTES:   PATIENT INSTRUCTIONS: 6/21/17, tc pt, 7 5 mg x 1 then 5 mg daily, rech 1  wwek, 6/28  bharathi  TEST LOCATION: [facility], , ,   INBOUND LAB DATA:  Lab       Lab Value Col Date                 Rpt Date                 Lab  Reference Range  Electronically signed by:  Sierra Robert on 6/21/2017 4:24 PM EDT

## 2018-01-16 NOTE — PROGRESS NOTES
REPORT NAME: Patient Visit Summary Report   VISIT DATE: 12/21/2016  VISIT TIME: 2:22 PM EST  PATIENT NAME: Maia Lopez  MEDICAL RECORD NUMBER: [id number]  SOCIAL SECURITY NUMBER:   YOB: 1934  AGE: 80  REFERRING PHYSICIAN: Dick Guadarrama  SUPERVISING CLINICIAN: Hermelinda Davis MD  HEALTH CARE PROFESSIONAL: [facility]  PATIENT HOME ADDRESS: [place], [place], [address] [place], [place] [address]  PATIENT HOME PHONE: [phone]  DIAGNOSIS 1: Mitral Valve Replacement / 424 0  DIAGNOSIS 2:   DIAGNOSIS 3:   DIAGNOSIS 4:   INR RANGE: 2 5 - 3 5  INR GOAL: 3  TREATMENT START DATE:   TREATMENT END DATE:   NEXT VISIT: 12/23/2016  [facility]    VISIT RESULTS   ENCOUNTER NUMBER:   TEST LOCATION: [facility]  TEST TYPE: Outside Lab (Venipuncture)  VISIT TYPE: Phone Consult  CURRENT INR: 5 79 PROTIME: 50 3  SPECIMEN COL AND RPT DATE: 12/21/2016 2:22  PM EST    VITAL SIGNS  PULSE:  B/P:  WEIGHT:  HEIGHT:  TEMP:     CURRENT ANTICOAGULANT DOSING SCHEDULE  DOSE SIZE: 5mg    ANTICOAGULANT TYPE: WARFARIN  DOSING REGIMEN  Sun       Mon       Tues      Wed       Thurs     Fri       Sat  Total/Wk  0         0         0         HOLD      HOLD      0         0         0    PATIENT MEDICATION INSTRUCTION: Yes  PATIENT NUTRITIONAL COUNSELING: No  PATIENT BRUISING INSTRUCTION: No      LAST EDUCATION DATE:       PREVIOUS VISIT INFORMATION  VISITDATE   INR Goal  INR   Sun     Mon     Tues    Wed     Thurs   Fri  Sat     Total/wk  12/21/2016  3         5 79  0       0       0       HOLD    HOLD    0  0       0  12/8/2016   3         2 73  5       5       5       5       5       5  5       35  11/29/2016  3         3 9   5       5       HOLD    5       5       5  5       30  11/2/2016   3         3 41  5       5       5       5       5       5  5       35    ADDITIONAL PREVIOUS VISIT INFORMATION  VISITDATE   PRIMARY RX               DOSE      CrCl  12/21/2016  WARFARIN                 5mg                 12/8/2016   WARFARIN 5mg                 11/29/2016  WARFARIN                 5mg                 11/2/2016   WARFARIN                 5mg                     OTHER CURRENT MEDICATIONS: WARFARIN, WARFARIN      PROGRESS NOTES:     PATIENT INSTRUCTIONS: 12/21/16, tc pt, lm am, hold warfarin wed/th, Helen Echevarria, 12/23  bharathi   ---- Additional Instructions entered on 12/21/2016 3:03 PM EST by Debrah Prader Perinotti :  12/21/16,vinh pt, reports she had gi virus  denies bleeding  bharathi  TEST LOCATION: [facility]    Electronically signed by:  Debrah Prader Perinotti on 12/21/2016 at 3:03 PM EST

## 2018-01-16 NOTE — PROGRESS NOTES
REPORT NAME: Patient Visit Summary Report   VISIT DATE: 7/26/2016  VISIT TIME: 4:44 PM EDT  PATIENT NAME: Brandon Gar  MEDICAL RECORD NUMBER: [id number]  SOCIAL SECURITY NUMBER:   YOB: 1934  AGE: 80  REFERRING PHYSICIAN: Juliocesar Coffey  SUPERVISING CLINICIAN: [address] CARE PROFESSIONAL: Tariq Nixon  PATIENT HOME ADDRESS: [place], [place], [address] [place], [place] [address]  PATIENT HOME PHONE: [phone]  DIAGNOSIS 1: Mitral Valve Replacement / 424 0  DIAGNOSIS 2:   DIAGNOSIS 3:   DIAGNOSIS 4:   INR RANGE: 2 5 - 3 5  INR GOAL: 3  TREATMENT START DATE:   TREATMENT END DATE:   NEXT VISIT: 8/9/2016  [facility]    VISIT RESULTS   ENCOUNTER NUMBER:   TEST LOCATION: [facility]  TEST TYPE: Outside Lab (Venipuncture)  VISIT TYPE: Phone Consult  CURRENT INR: 3 28 PROTIME: 32 7  SPECIMEN COL AND RPT DATE: 7/26/2016 4:44  PM EDT    VITAL SIGNS  PULSE:  B/P:  WEIGHT:  HEIGHT:  TEMP:     CURRENT ANTICOAGULANT DOSING SCHEDULE  DOSE SIZE: 5mg    ANTICOAGULANT TYPE: WARFARIN  DOSING REGIMEN  Sun       Mon Tues Wed Thurs Fri       Sat  Total/Wk  5         5         5         5         5         5         5         35    PATIENT MEDICATION INSTRUCTION: Yes  PATIENT NUTRITIONAL COUNSELING: No  PATIENT BRUISING INSTRUCTION: No      LAST EDUCATION DATE:       PREVIOUS VISIT INFORMATION  VISITDATE   INR Goal  INR   Sun     Mon     Tues    Wed     Thurs   Fri  Sat     Total/wk  7/26/2016   3         3 28  5       5       5       5       5       5  5       35  7/18/2016   3         3 4   5       5       5       5       5       5  5       35  7/12/2016   3         1 98  5       5       10      5       5       5  5       40  7/1/2016    3         2 5   2 5     5       5       5       5       5  5       32 5    ADDITIONAL PREVIOUS VISIT INFORMATION  VISITDATE   PRIMARY RX               DOSE      CrCl  7/26/2016   WARFARIN                 5mg                 7/18/2016 WARFARIN                 5mg                 7/12/2016   WARFARIN                 5mg                 7/1/2016    WARFARIN                 5mg                     OTHER CURRENT MEDICATIONS: WARFARIN, WARFARIN      PROGRESS NOTES:     PATIENT INSTRUCTIONS: 7/26/16, tc pt, cont current dose, rech 2 weeks, 8/9   bharathi  TEST LOCATION: [facility]    Electronically signed by:  Tarun Robert on 7/26/2016 at 4:44 PM EDT

## 2018-01-16 NOTE — PROGRESS NOTES
REPORT NAME: Patient Visit Summary Report   VISIT DATE: 12/23/2016  VISIT TIME: 2:53 PM EST  PATIENT NAME: Heavenly Amador  MEDICAL RECORD NUMBER: [id number]  SOCIAL SECURITY NUMBER:   YOB: 1934  AGE: 80  REFERRING PHYSICIAN: Aidee Cortes  SUPERVISING CLINICIAN: [address] CARE PROFESSIONAL: Lamin Casanova  PATIENT HOME ADDRESS: [place], [place], [facility] [address]  PATIENT HOME PHONE: [phone]  DIAGNOSIS 1: Mitral Valve Replacement / 424 0  DIAGNOSIS 2:   DIAGNOSIS 3:   DIAGNOSIS 4:   INR RANGE: 2 5 - 3 5  INR GOAL: 3  TREATMENT START DATE:   TREATMENT END DATE:   NEXT VISIT: 12/30/2016  [facility]    VISIT RESULTS   ENCOUNTER NUMBER:   TEST LOCATION: [facility]  TEST TYPE: Outside Lab (Venipuncture)  VISIT TYPE:   CURRENT INR: 2 56 PROTIME:   SPECIMEN COL AND RPT DATE: 12/23/2016 2:53 PM  EST    VITAL SIGNS  PULSE:  B/P:  WEIGHT:  HEIGHT:  TEMP:     CURRENT ANTICOAGULANT DOSING SCHEDULE  DOSE SIZE: 5mg    ANTICOAGULANT TYPE: WARFARIN  DOSING REGIMEN  Sun       Mon       Tues      Wed       Thurs     Fri       Sat  Total/Wk  5         5         5         5         5         5         5         35    PATIENT MEDICATION INSTRUCTION: No  PATIENT NUTRITIONAL COUNSELING: No  PATIENT BRUISING INSTRUCTION: No      LAST EDUCATION DATE:       PREVIOUS VISIT INFORMATION  VISITDATE   INR Goal  INR   Sun     Mon     Tues    Wed     Thurs   Fri  Sat     Total/wk  12/23/2016  3         2 56  5       5       5       5       5       5  5       35  12/21/2016  3         5 79  0       0       0       HOLD    HOLD    0  0       0  12/8/2016   3         2 73  5       5       5       5       5       5  5       35  11/29/2016  3         3 9   5       5       HOLD    5       5       5  5       30    ADDITIONAL PREVIOUS VISIT INFORMATION  VISITDATE   PRIMARY RX               DOSE      CrCl  12/23/2016  WARFARIN                 5mg                 12/21/2016  WARFARIN                 5mg 12/8/2016   WARFARIN                 5mg                 11/29/2016  WARFARIN                 5mg                     OTHER CURRENT MEDICATIONS: WARFARIN, WARFARIN      PROGRESS NOTES:     PATIENT INSTRUCTIONS: pt called  feeling better    resume 5 mg daily and  recheck in 1 week      TEST LOCATION: [facility]    Electronically signed by: Megan Chilel on 12/23/2016 at 2:53 PM EST

## 2018-01-16 NOTE — RESULT NOTES
Verified Results  Procedure Flowsheet [id number] 09:39AM Mariia Alvarado     Test Name Result Flag Reference   Urine Drug Screen Performed Date [id number]

## 2018-01-16 NOTE — PROGRESS NOTES
REPORT NAME: Patient Visit Summary Report   VISIT DATE: 1/25/2017  VISIT TIME: 2:42 PM EST  PATIENT NAME: Sinai Gastelum  MEDICAL RECORD NUMBER: [id number]  SOCIAL SECURITY NUMBER:   YOB: 1934  AGE: 80  REFERRING PHYSICIAN: Chris Arredondo  SUPERVISING CLINICIAN: [address] CARE PROFESSIONAL: Mikaela Nixon  PATIENT HOME ADDRESS: [address]  PATIENT HOME PHONE: [phone]  DIAGNOSIS 1: Mitral Valve Replacement / 424 0  DIAGNOSIS 2:   DIAGNOSIS 3:   DIAGNOSIS 4:   INR RANGE: 2 5 - 3 5  INR GOAL: 3  TREATMENT START DATE:   TREATMENT END DATE:   NEXT VISIT: 2/8/2017  [facility]    VISIT RESULTS   ENCOUNTER NUMBER:   TEST LOCATION: [facility]  TEST TYPE: Outside Lab (Venipuncture)  VISIT TYPE: Phone Consult  CURRENT INR: 2 26 PROTIME: 24 7  SPECIMEN COL AND RPT DATE: 1/25/2017 2:42  PM EST    VITAL SIGNS  PULSE:  B/P:  WEIGHT:  HEIGHT:  TEMP:     CURRENT ANTICOAGULANT DOSING SCHEDULE  DOSE SIZE: 5mg    ANTICOAGULANT TYPE: WARFARIN  DOSING REGIMEN  Sun       Mon       Tues      Wed       Thurs     Fri       Sat  Total/Wk  5         5         5         5         5         5         5         35    PATIENT MEDICATION INSTRUCTION: Yes  PATIENT NUTRITIONAL COUNSELING: No  PATIENT BRUISING INSTRUCTION: No      LAST EDUCATION DATE:       PREVIOUS VISIT INFORMATION  VISITDATE   INR Goal  INR   Sun     Mon     Tues    Wed     Thurs   Fri  Sat     Total/wk  1/25/2017   3         2 26  5       5       5       5       5       5  5       35  1/17/2017   3         3 63  5       5       2 5     5       5       5  5       32 5  12/30/2016  3         2 89  5       5       5       5       5       5  5       35  12/23/2016  3         2 56  5       5       5       5       5       5  5       35    ADDITIONAL PREVIOUS VISIT INFORMATION  VISITDATE   PRIMARY RX               DOSE      CrCl  1/25/2017   WARFARIN                 5mg                 1/17/2017   WARFARIN 5mg                 12/30/2016  WARFARIN                 5mg                 12/23/2016  WARFARIN                 5mg                     OTHER CURRENT MEDICATIONS: WARFARIN, WARFARIN      PROGRESS NOTES:     PATIENT INSTRUCTIONS: 1/25/17, tc pt, increase dose, 5 mg daily, rech 2  weeks, 2/8  bharathi  TEST LOCATION: [facility]    Electronically signed by:  Boni Robert on 1/25/2017 at 2:42 PM EST

## 2018-01-16 NOTE — PROGRESS NOTES
REPORT NAME: Patient Visit Summary Report   VISIT DATE: 8/9/2016  VISIT TIME: 9:35 AM EDT  PATIENT NAME: Papo Tang  MEDICAL RECORD NUMBER: [id number]  SOCIAL SECURITY NUMBER:   YOB: 1934  AGE: 80  REFERRING PHYSICIAN: Nadiay Hair  SUPERVISING CLINICIAN: [address] CARE PROFESSIONAL: Mayes [address]  PATIENT HOME ADDRESS: [address] PHONE: [phone]  DIAGNOSIS 1: Mitral Valve Replacement / 424 0  DIAGNOSIS 2:   DIAGNOSIS 3:   DIAGNOSIS 4:   INR RANGE: 2 5 - 3 5  INR GOAL: 3  TREATMENT START DATE:   TREATMENT END DATE:   NEXT VISIT: 9/7/2016  [facility]    VISIT RESULTS   ENCOUNTER NUMBER:   TEST LOCATION: [facility]  TEST TYPE: Outside Lab (Venipuncture)  VISIT TYPE: Phone Consult  CURRENT INR: 3 08 PROTIME: 31 2  SPECIMEN COL AND RPT DATE: 8/9/2016 9:35  AM EDT    VITAL SIGNS  PULSE:  B/P:  WEIGHT:  HEIGHT:  TEMP:     CURRENT ANTICOAGULANT DOSING SCHEDULE  DOSE SIZE: 5mg    ANTICOAGULANT TYPE: WARFARIN  DOSING REGIMEN  Sun       Mon       Tues      Wed       Thurs     Fri       Sat  Total/Wk  5         5         5         5         5         5         5         35    PATIENT MEDICATION INSTRUCTION: Yes  PATIENT NUTRITIONAL COUNSELING: No  PATIENT BRUISING INSTRUCTION: No      LAST EDUCATION DATE:       PREVIOUS VISIT INFORMATION  VISITDATE   INR Goal  INR   Sun     Mon     Tues    Wed     Thurs   Fri  Sat     Total/wk  8/9/2016    3         3 08  5       5       5       5       5       5  5       35  7/26/2016   3         3 28  5       5       5       5       5       5  5       35  7/18/2016   3         3 4   5       5       5       5       5       5  5       35  7/12/2016   3         1 98  5       5       10      5       5       5  5       40    ADDITIONAL PREVIOUS VISIT INFORMATION  VISITDATE   PRIMARY RX               DOSE      CrCl  8/9/2016    WARFARIN                 5mg                 7/26/2016   WARFARIN 5mg                 7/18/2016   WARFARIN                 5mg                 7/12/2016   WARFARIN                 5mg                     OTHER CURRENT MEDICATIONS: WARFARIN, WARFARIN      PROGRESS NOTES:     PATIENT INSTRUCTIONS: 8/10/16, tc pt, cont current dose, rech 1 month, 9/7   bharathi  TEST LOCATION: [facility]    Electronically signed by:  Samantha Robert on 8/10/2016 at 9:35 AM EDT

## 2018-01-17 NOTE — PROGRESS NOTES
REPORT NAME: Progress Notes Report  VISIT DATE: 7/10/2017  VISIT TIME: 3:27 PM EDT  PATIENT NAME: Giancarlo Uribe  MEDICAL RECORD NUMBER: [id number]  YOB: 1934  AGE: 80  REFERRING PHYSICIAN: Berl Lesch  SUPERVISING CLINICIAN: [address] [facility] CARE PROVIDER: Ricky Agarwal  PATIENT HOME ADDRESS: [address], [facility]  PATIENT HOME PHONE: [phone]  SOCIAL SECURITY NUMBER:   DIAGNOSIS 1: Mitral Valve Replacement / 424 0  DIAGNOSIS 2:   INR RANGE: 2 5 - 3 5  INR GOAL: 3  TREATMENT START DATE:   TREATMENT END DATE:   NEXT VISIT: 8/1/2017  [facility]  VISIT RESULTS  ENCOUNTER NUMBER:   TEST LOCATION: [facility]  TEST TYPE: Outside Lab (Venipuncture)  VISIT TYPE:   CURRENT INR: 2 68 PROTIME:   SPECIMEN COL AND RPT DATE: 7/10/2017 3:27 PM  EDT  VITAL SIGNS  PULSE:  BP: / WEIGHT:  HEIGHT:  TEMP:   CURRENT ANTICOAGULANT DOSING SCHEDULE  DOSE SIZE: 5mg    ANTICOAGULANT TYPE: WARFARIN  DOSING REGIMEN  Sun       Mon       Tues      Wed       Thurs     Fri       Sat  Total/Wk  5         5         5         5         5         5         5         35  PATIENT MEDICATION INSTRUCTION: No  PATIENT NUTRITIONAL COUNSELING: No  PATIENT BRUISING INSTRUCTION: No  LAST EDUCATION DATE:   PREVIOUS VISIT INFORMATION  VISITDATE  INRGoal INR   Sun    Mon    Tues   Wed    Thurs  Fri    Sat  Total/wk  7/10/2017   3       2 68  5      5      5      5      5      5      5  35  6/28/2017   3       2 51  5      5      5      5      5      5      5  35  6/21/2017   3       1 65  5      5      5      7 5    5      5      5  37 5  6/14/2017   3       2 18  2 5    5      5      5      5      5      5  32 5  ADDITIONAL PREVIOUS VISIT INFORMATION  VISITDATE   PRIMARY RX               DOSE      CrCl  7/10/2017   WARFARIN                 5mg  6/28/2017   WARFARIN                 5mg  6/21/2017   WARFARIN                 5mg  6/14/2017   WARFARIN                 5mg  OTHER CURRENT MEDICATIONS:  WARFARIN, WARFARIN  PROGRESS NOTES:   PATIENT INSTRUCTIONS: pt called recheck in 3 weeks same dose  TEST LOCATION: [facility], , ,   INBOUND LAB DATA:  Lab       Lab Value Col Date                 Rpt Date                 Lab  Reference Range  Electronically signed by: Melia Carter on 7/10/2017 3:27 PM EDT

## 2018-01-17 NOTE — PROGRESS NOTES
REPORT NAME: Patient Visit Summary Report   VISIT DATE: 1/17/2017  VISIT TIME: 3:45 PM EST  PATIENT NAME: Venessa Bullock  MEDICAL RECORD NUMBER: [id number]  SOCIAL SECURITY NUMBER:   YOB: 1934  AGE: 80  REFERRING PHYSICIAN: Wendy Lau  SUPERVISING CLINICIAN: [address] CARE PROFESSIONAL: Kylah Nixon  PATIENT HOME ADDRESS: [address], [address] [place], [place] [address]  PATIENT HOME PHONE: [phone]  DIAGNOSIS 1: Mitral Valve Replacement / 424 0  DIAGNOSIS 2:   DIAGNOSIS 3:   DIAGNOSIS 4:   INR RANGE: 2 5 - 3 5  INR GOAL: 3  TREATMENT START DATE:   TREATMENT END DATE:   NEXT VISIT: 1/24/2017  [facility]    VISIT RESULTS   ENCOUNTER NUMBER:   TEST LOCATION: [facility]  TEST TYPE: Outside Lab (Venipuncture)  VISIT TYPE: Phone Consult  CURRENT INR: 3 63 PROTIME: 35 3  SPECIMEN COL AND RPT DATE: 1/17/2017 3:45  PM EST    VITAL SIGNS  PULSE:  B/P:  WEIGHT:  HEIGHT:  TEMP:     CURRENT ANTICOAGULANT DOSING SCHEDULE  DOSE SIZE: 5mg    ANTICOAGULANT TYPE: WARFARIN  DOSING REGIMEN  Sun       Mon Tues Wed Thurs Fri       Sat  Total/Wk  5         5         2 5       5         5         5         5         32 5    PATIENT MEDICATION INSTRUCTION: Yes  PATIENT NUTRITIONAL COUNSELING: No  PATIENT BRUISING INSTRUCTION: No      LAST EDUCATION DATE:       PREVIOUS VISIT INFORMATION  VISITDATE   INR Goal  INR   Sun     Mon     Tues    Wed     Thurs   Fri  Sat     Total/wk  1/17/2017   3         3 63  5       5       2 5     5       5       5  5       32 5  12/30/2016  3         2 89  5       5       5       5       5       5  5       35  12/23/2016  3         2 56  5       5       5       5       5       5  5       35  12/21/2016  3         5 79  0       0       0       HOLD    HOLD    0  0       0    ADDITIONAL PREVIOUS VISIT INFORMATION  VISITDATE   PRIMARY RX               DOSE      CrCl  1/17/2017   WARFARIN                 5mg                 12/30/2016  WARFARIN 5mg                 12/23/2016  WARFARIN                 5mg                 12/21/2016  WARFARIN                 5mg                     OTHER CURRENT MEDICATIONS: WARFARIN, WARFARIN      PROGRESS NOTES:     PATIENT INSTRUCTIONS: 1/17/17, tc pt, denies any med changes  will decrease  dose, 2 5 mg tues, 5 mg others, rech 1 weeks, 1/24  bharathi  TEST LOCATION: [facility]    Electronically signed by:  Amos Robert on 1/17/2017 at 3:45 PM EST

## 2018-01-17 NOTE — PROGRESS NOTES
REPORT NAME: Patient Visit Summary Report   VISIT DATE: 6/28/2016  VISIT TIME: 12:39 PM EDT  PATIENT NAME: Sinai Gastelum  MEDICAL RECORD NUMBER: [id number]  SOCIAL SECURITY NUMBER:   YOB: 1934  AGE: 80  REFERRING PHYSICIAN: Chris Arredondo  SUPERVISING CLINICIAN: [address] CARE PROFESSIONAL: Mikaela DOWELL [facility]  PATIENT HOME ADDRESS: [address]  PATIENT HOME PHONE: [phone]  DIAGNOSIS 1: Mitral Valve Replacement / 424 0  DIAGNOSIS 2:   DIAGNOSIS 3:   DIAGNOSIS 4:   INR RANGE: 2 5 - 3 5  INR GOAL: 3  TREATMENT START DATE:   TREATMENT END DATE:   NEXT VISIT: 7/1/2016  [facility]    VISIT RESULTS   ENCOUNTER NUMBER:   TEST LOCATION: [facility]  TEST TYPE: Outside Lab (Venipuncture)  VISIT TYPE: Phone Consult  CURRENT INR: 1 36 PROTIME: 16 8  SPECIMEN COL AND RPT DATE: 6/28/2016 12:39  PM EDT    VITAL SIGNS  PULSE:  B/P:  WEIGHT:  HEIGHT:  TEMP:     CURRENT ANTICOAGULANT DOSING SCHEDULE  DOSE SIZE: 5mg    ANTICOAGULANT TYPE: WARFARIN  DOSING REGIMEN  Sun       Mon Tues Wed Thurs Fri       Sat  Total/Wk  0         0         0         10        10        0         0         20    PATIENT MEDICATION INSTRUCTION: Yes  PATIENT NUTRITIONAL COUNSELING: No  PATIENT BRUISING INSTRUCTION: No      LAST EDUCATION DATE:       PREVIOUS VISIT INFORMATION  VISITDATE   INR Goal  INR   Sun     Mon     Tues    Wed     Thurs   Fri  Sat     Total/wk  6/28/2016   3         1 36  0       0       0       10      10      0  0       20  5/23/2016   3         2 84  2 5     5       5       5       5       5  5       32 5  4/27/2016   3         2 6   2 5     5       5       5       5       5  5       32 5  4/13/2016   3         2 3   2 5     5       5       5       5       5  5       32 5    ADDITIONAL PREVIOUS VISIT INFORMATION  VISITDATE   PRIMARY RX               DOSE      CrCl  6/28/2016   WARFARIN                 5mg                 5/23/2016 WARFARIN                 5mg                 4/27/2016   WARFARIN                 5mg                 4/13/2016   WARFARIN                 5mg                     OTHER CURRENT MEDICATIONS: WARFARIN, WARFARIN      PROGRESS NOTES:     PATIENT INSTRUCTIONS: 6/29/16, pt seen at ov with rp, per rp, 10 mg w/th,  Prachi Peralta, 7/1  given stat script for 7/1  bharathi  TEST LOCATION: [facility]    Electronically signed by:  Veronica Robert on 6/29/2016 at 12:39 PM EDT

## 2018-01-17 NOTE — PROGRESS NOTES
REPORT NAME: Progress Notes Report  VISIT DATE: 3/20/2017  VISIT TIME: 1:24 PM EDT  PATIENT NAME: Malika Kovacs  MEDICAL RECORD NUMBER: [id number]  YOB: 1934  AGE: 80  REFERRING PHYSICIAN: Sherly Aaron  SUPERVISING CLINICIAN: [address] [facility] CARE PROVIDER: Yaneth Nixon  PATIENT HOME ADDRESS: [address], [facility] [address]  PATIENT HOME PHONE: [phone]  SOCIAL SECURITY NUMBER:   DIAGNOSIS 1: Mitral Valve Replacement / 424 0  DIAGNOSIS 2:   INR RANGE: 2 5 - 3 5  INR GOAL: 3  TREATMENT START DATE:   TREATMENT END DATE:   NEXT VISIT: 3/23/2017  [facility]  VISIT RESULTS  ENCOUNTER NUMBER:   TEST LOCATION: [facility]  TEST TYPE: Outside Lab (Venipuncture)  VISIT TYPE: Phone Consult  CURRENT INR: 1 83 PROTIME: 21  SPECIMEN COL AND RPT DATE: 3/20/2017 1:24 PM  EDT  VITAL SIGNS  PULSE:  BP: / WEIGHT:  HEIGHT:  TEMP:   CURRENT ANTICOAGULANT DOSING SCHEDULE  DOSE SIZE: 5mg    ANTICOAGULANT TYPE: WARFARIN  DOSING REGIMEN  Sun       Mon       Tues      Wed       Thurs     Fri       Sat  Total/Wk  5         5         7 5       5         0         0         0         22 5  PATIENT MEDICATION INSTRUCTION: Yes  PATIENT NUTRITIONAL COUNSELING: No  PATIENT BRUISING INSTRUCTION: No  LAST EDUCATION DATE:   PREVIOUS VISIT INFORMATION  VISITDATE  INRGoal INR   Sun    Mon    Tues   Wed    Thurs  Fri    Sat  Total/wk  3/20/2017   3       1 83  5      5      7 5    5      0      0      0  22 5  3/16/2017   3       1 22  0      0      0      0      HOLD   0      0  0  3/9/2017    3       2 63  5      5      5      5      5      5      2 5  32 5  2/24/2017   3       2 71  5      5      5      5      5      5      2 5  32 5  ADDITIONAL PREVIOUS VISIT INFORMATION  VISITDATE   PRIMARY RX               DOSE      CrCl  3/20/2017   WARFARIN                 5mg  3/16/2017   WARFARIN                 5mg  3/9/2017    WARFARIN                 5mg  2/24/2017   WARFARIN                 5mg  OTHER CURRENT MEDICATIONS:  WARFARIN, WARFARIN  PROGRESS NOTES:   PATIENT INSTRUCTIONS: 3/21/17, tc pt, per RP, cont lovenox bridging  take  7 5 mg today in place of 5 mg , 5 mg wed  inr thurs, 3/23  bharathi  TEST LOCATION: [facility], , ,   INBOUND LAB DATA:  Lab       Lab Value Col Date                 Rpt Date                 Lab  Reference Range  Electronically signed by:  Leticia Robert on 3/21/2017 1:24 PM EDT

## 2018-01-17 NOTE — MISCELLANEOUS
Message   Recorded as Task   Date: 03/23/2016 02:37 PM, Created By: Lani Zapata   Task Name: Care Coordination   Assigned To: [address] [place] [address] clinical,Team   Regarding Patient: Elkin Cody, Status: Active   CommentShipeace Hajicecelia - 23 Mar 2016 2:37 PM     TASK CREATED  Please cancel coumadin hold request for mackenzie sauceda as well as the epidural injection  She has a mechanical mitral valve and we can not hold  her coumadin per her cardiologist  Nasir Mane  Please let patient know as well  BrianrejiDivya - 28 Mar 2016 1:42 PM     TASK REASSIGNED: Previously Assigned To [address] [place] [address] end procedure,Team   Sally Yangu - 28 Mar 2016 3:36 PM     TASK REASSIGNED: Previously Assigned To [address] [place] [address] clinical,Team  I spoke with pt and advised of the same  Pt states that she changed cardiologists  She goes to the same cardiology group,but now she sees DR Lauren Christianson instead of Dr Tania Rivera  She states that Dr Tania Rivera allowed her to hold coumadin this past summer for colonoscopy and that she used lovenox  Pt states she doesn't understand-states she would like to have the injection b/c she has to do something  Pt states she may call Dr Tania Rivera  I advised pt that is up to her but that Stephie Do is going by the recommendations of her current cardiologist   Pt understood and was appreciative  She would like to know your recommendations from here? Preeti Denton - 29 Mar 2016 11:29 AM     TASK REPLIED TO: Previously Assigned To Lani Zapata  unfortunately it is safest for her to avoid the injection at this point the coumadin is more important  We can reinitiate the percocet that she was taking in the past for pain relief  if interested  let me know? Mk Yang - 29 Mar 2016 12:25 PM     TASK REPLIED TO: Previously Assigned To [address] [place] [address] clinical,Team  JORJE left on home phone to garett YangMk - 29 Mar 2016 12:25 PM     TASK IN PROGRESS   Mk Yang - 29 Mar 2016 3:48 PM     TASK REPLIED TO: Previously Assigned To [facility] with pt and advised of the same  Pt states that percocet did not help her in the past and it made her sick,she couldn't eat  Pt states she is not interested in taking  Pt reiterated with me how she changed doctors and how she was able to hold coumadin in the past and take lovenox  Pt states she is going to call and talk to Dr Claudia Navarro states she will call us back with update  Preeti Denton - 29 Mar 2016 4:05 PM     TASK REPLIED TO: Previously Assigned To Chula Petty  aware  Per Dr Davidson Huston he does not wish to pursue this option as she did not have relief in the past, also Dr Nighat Montoya states when we tried to hold her coumadin for SCS that unless the procedure will guarantee relief rather than a diagnostic procedure he would not hold the coumadin  Tamar Floyd - 30 Mar 2016 8:52 AM     TASK EDITED  TC from pt, I informed her of Dr Carlyon Hodgkins recommendations as outlined by Preeti's task note  Pt stated she s/w Dr María Elena Neal yest about the issue who said she reviewed her charts and did not see any conversation documented that coumadin couldn't be held and Hector suggested someone from our office call and discuss matter with her and then she would talk to Dr Nighat Montoya about it  Pt is really interested in pursing getting the inj done to help her pain,she does not want to go back on the percocet  AnnaRose Perinotti's c/b # is [phone]  Tamar Floyd - 30 Mar 2016 9:41 AM     TASK EDITED  Discussed pt's call with Josie Huston will not perform TFESI b/c pt's has mechanical valve and her coumadin can not be stopped, it is a safety issue  Explained to pt that the [facility] inj doesn't require coumadin to be held but that didn't provide any relief in the past, so pain medication is her other options for now  Pt doesn't want to do percocet, said she uses tramadol  that her PCP prescribes   Can take tramadol up to TID but only does that when the pain is bad  Advised pt to f/u with PCP about tramadol or she can come in for ov to discuss pain meds  Pt opted for ov w/ Dr Antonette Bradley  Pt given ov for tomorrow at 2:30 w/ Odilon Kramer - 30 Mar 2016 10:01 AM     TASK REPLIED TO: Previously Assigned To Anita Isidro  aware and agree        Active Problems    1  Analgesic use (V58 69) (Z79 899)   2  Anticoagulated by anticoagulation treatment (V58 61) (Z79 01)   3  Arthritis (716 90) (M19 90)   4  Atrial fibrillation (427 31) (I48 91)   5  Backache (724 5) (M54 9)   6  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)   7  Burning sensation (782 0) (R20 8)   8  Coagulation defect (286 9) (D68 9)   9  Current use of long term anticoagulation (V58 61) (Z79 01)   10  Disc degeneration, 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)   11  Encounter for Medicare annual wellness exam (V70 0) (Z00 00)   12  Encounter for screening colonoscopy (V76 51) (Z12 11)   13  Encounter for screening mammogram for malignant neoplasm of breast (V76 12)    (Z12 31)   14  Hematuria (599 70) (R31 9)   15  Hyperlipidemia (272 4) (E78 5)   16  Hypertension (401 9) (I10)   17  Limb pain (729 5) (M79 609)   18  Limb Weakness (Paresis) (728 87)   19  Low back pain (724 2) (M54 5)   20  Lumbar radiculopathy (724 4) (M54 16)   21  Lumbar stenosis with neurogenic claudication (724 03) (M48 06)   22  History of Mitral Valve Replacement   23  Myofascial pain syndrome (729 1) (M79 1)   24  Osteopenia (733 90) (M85 80)   25  Pain syndrome, chronic (338 4) (G89 4)   26  Sacroiliitis (720 2) (M46 1)   27  Senile purpura (287 2) (D69 2)   28  Spinal stenosis (724 00) (M48 00)   29  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 myelopathy or radiculopathy (721 3)    (M47 817)   30  Tendonitis Of The Left Shoulder (726 10)   31  Type 2 diabetes mellitus (250 00) (E11 9)   32  Visit for screening mammogram (V76 12) (Z12 31)   33  Vitamin D deficiency (268 9) (E55 9)    Current Meds   1   Atenolol 50 MG Oral Tablet; take one tablet by mouth every day; Therapy: 58EJJ5211 to ([facility])  Requested for: 85WOU4161; Last   Rx:52Okz1345 Ordered   2  Gabapentin 300 MG Oral Capsule; TAKE 2 CAPSULES BY MOUTH 3 TIMES A DAY; Therapy: 08JWH9136 to (Evaluate:69Jyw4256)  Requested for: 89HWO3650; Last   Rx:43Vcp7640 Ordered   3  Losartan Potassium-HCTZ 100-12 5 MG Oral Tablet; TAKE 1 TABLET DAILY; Therapy: 31AHD4085 to (BIGXVRNN:23XFQ8588)  Requested for: 72ASK0075; Last   Rx:00Pbq3477 Ordered   4  MetFORMIN HCl  MG Oral Tablet Extended Release 24 Hour; take one tablet by   mouth every day; Therapy: 40FRV5580 to (FPYEFYWL:00VVM1505)  Requested for: 10GDA1334; Last   Rx:61Epx0296 Ordered   5  Pravastatin Sodium 40 MG Oral Tablet; Take 1 tablet daily; Therapy: 30ULG0984 to (Evaluate:21Msu7020)  Requested for: 76RDE2016; Last   Rx:03Auo6649 Ordered   6  TraMADol HCl - 50 MG Oral Tablet; Take 1 tablet 3 times daily as needed; Therapy: 94SVX0723 to ([facility])  Requested for: 77GMT3737; Last   Rx:13Wpm7933 Ordered   7  Warfarin Sodium 5 MG Oral Tablet; TAKE 1 TABLET DAILY AS DIRECTED; Therapy: 32IWK2115 to ([facility])  Requested for: 11PYD3538; Last   Rx:08Ryd1956 Ordered    Allergies    1   Lisinopril TABS    Signatures   Electronically signed by : Daron Mclaughlin MD; Mar 30 2016 10:50AM EST                       (Author)

## 2018-01-18 NOTE — RESULT NOTES
Verified Results  * XR CHEST PA & LATERAL [id number] 12:04PM Rao Brito     Test Name Result Flag Reference   XR CHEST PA & LATERAL (Report)     CHEST      INDICATION:  heart failure I50 9, cough R05 History/Symptoms -cough and chest congestion x 1 year, hx  open heart surgery in 80' and 97'      COMPARISON: 10/26/2012     VIEWS: Frontal and lateral projections; 2 images     FINDINGS: Sternotomy wires and mediastinal clips are noted, compatible with prior CABG  Prosthetic valve noted likely a mitral valvuloplasty, stable from prior study  Heart shadow appears unremarkable  Atherosclerotic vascular calcifications are noted  The lungs are clear  No pneumothorax or pleural effusion  Mild spondylotic changes noted  IMPRESSION:     No active pulmonary disease  Workstation performed: [id number]     Signed by:    Wade Fountain MD   11/18/16

## 2018-01-18 NOTE — RESULT NOTES
Message   Please forward results to Dr Mihir Segura as they are pre procedure     Verified Results  (1) PT WITH INR [id number] 08:26AM Phillip Duarte Order Number: [id number]     Order Number: [id number]     Test Name Result Flag Reference   INR 3 18 H 0 86-1 16   PT 30 8 seconds H 11 8-14 1

## 2018-01-19 ENCOUNTER — APPOINTMENT (OUTPATIENT)
Dept: LAB | Facility: CLINIC | Age: 83
End: 2018-01-19
Payer: MEDICARE

## 2018-01-19 ENCOUNTER — GENERIC CONVERSION - ENCOUNTER (OUTPATIENT)
Dept: OTHER | Facility: OTHER | Age: 83
End: 2018-01-19

## 2018-01-19 ENCOUNTER — TRANSCRIBE ORDERS (OUTPATIENT)
Dept: LAB | Facility: CLINIC | Age: 83
End: 2018-01-19

## 2018-01-19 ENCOUNTER — ANTICOAG VISIT (OUTPATIENT)
Dept: CARDIOLOGY CLINIC | Facility: CLINIC | Age: 83
End: 2018-01-19

## 2018-01-19 DIAGNOSIS — I48.91 ATRIAL FIBRILLATION, UNSPECIFIED TYPE (HCC): ICD-10-CM

## 2018-01-19 DIAGNOSIS — Z95.2 HEART VALVE REPLACED BY TRANSPLANT: ICD-10-CM

## 2018-01-19 DIAGNOSIS — Z79.01 LONG-TERM (CURRENT) USE OF ANTICOAGULANTS: Primary | ICD-10-CM

## 2018-01-19 LAB
INR PPP: 2.61 (ref 0.86–1.16)
PROTHROMBIN TIME: 28.3 SECONDS (ref 12.1–14.4)

## 2018-01-19 PROCEDURE — 36415 COLL VENOUS BLD VENIPUNCTURE: CPT

## 2018-01-19 PROCEDURE — 85610 PROTHROMBIN TIME: CPT

## 2018-01-22 VITALS
HEIGHT: 64 IN | TEMPERATURE: 98 F | SYSTOLIC BLOOD PRESSURE: 118 MMHG | WEIGHT: 163 LBS | DIASTOLIC BLOOD PRESSURE: 60 MMHG | HEART RATE: 78 BPM | BODY MASS INDEX: 27.83 KG/M2

## 2018-01-22 RX ORDER — TRAMADOL HYDROCHLORIDE 50 MG/1
TABLET ORAL
COMMUNITY
Start: 2013-10-24 | End: 2018-05-09 | Stop reason: SDUPTHER

## 2018-01-22 RX ORDER — LOSARTAN POTASSIUM AND HYDROCHLOROTHIAZIDE 12.5; 1 MG/1; MG/1
1 TABLET ORAL DAILY
COMMUNITY
Start: 2012-01-16 | End: 2018-06-25 | Stop reason: SDUPTHER

## 2018-01-22 RX ORDER — PRAVASTATIN SODIUM 40 MG
1 TABLET ORAL DAILY
COMMUNITY
Start: 2012-10-02 | End: 2018-02-12 | Stop reason: SDUPTHER

## 2018-01-22 RX ORDER — GABAPENTIN 300 MG/1
CAPSULE ORAL
COMMUNITY
Start: 2014-05-13 | End: 2018-08-01 | Stop reason: SDUPTHER

## 2018-01-22 RX ORDER — ATENOLOL 50 MG/1
1 TABLET ORAL DAILY
COMMUNITY
Start: 2012-01-16 | End: 2018-03-06 | Stop reason: SDUPTHER

## 2018-01-22 RX ORDER — METFORMIN HYDROCHLORIDE 500 MG/1
1 TABLET, EXTENDED RELEASE ORAL DAILY
COMMUNITY
Start: 2012-01-16 | End: 2018-03-06 | Stop reason: SDUPTHER

## 2018-01-23 NOTE — PROGRESS NOTES
REPORT NAME: Progress Notes Report  VISIT DATE: 12/12/2017  VISIT TIME: 4:36 PM EST  PATIENT NAME: Jaz Grossman  MEDICAL RECORD NUMBER: [id number]  YOB: 1934  AGE: 80  REFERRING PHYSICIAN: Jasmine Stout  SUPERVISING CLINICIAN: [facility] CARE PROVIDER: Samreen Nixon  PATIENT HOME ADDRESS: [address] PHONE: [phone]  SOCIAL SECURITY NUMBER:   DIAGNOSIS 1: Mitral Valve Replacement / 424 0  DIAGNOSIS 2:   INR RANGE: 2 5 - 3 5  INR GOAL: 3  TREATMENT START DATE:   TREATMENT END DATE:   NEXT VISIT: 1/3/2018  [facility]  VISIT RESULTS  ENCOUNTER NUMBER:   TEST LOCATION: [facility]  TEST TYPE: Outside Lab (Venipuncture)  VISIT TYPE: Phone Consult  CURRENT INR: 2 88 PROTIME: 30 6  SPECIMEN COL AND RPT DATE: 12/12/2017 4:36  PM EST  VITAL SIGNS  PULSE:  BP: / WEIGHT:  HEIGHT:  TEMP:   CURRENT ANTICOAGULANT DOSING SCHEDULE  DOSE SIZE: 5mg    ANTICOAGULANT TYPE: WARFARIN  DOSING REGIMEN  Sun       Mon       Tues      Wed       Thurs     Fri       Sat  Total/Wk  5         5         5         5         5         5         5         35  PATIENT MEDICATION INSTRUCTION: Yes  PATIENT NUTRITIONAL COUNSELING: No  PATIENT BRUISING INSTRUCTION: No  LAST EDUCATION DATE:   PREVIOUS VISIT INFORMATION  VISITDATE  INRGoal INR   Sun    Mon    Tues   Wed    Thurs  Fri    Sat  Total/wk  12/12/2017  3       2 88  5      5      5      5      5      5      5  35  11/21/2017  3       2 66  5      5      5      5      5      5      5  35  11/7/2017   3       2 33  5      5      2 5    5      5      5      5  32 5  10/17/2017  3       2 67  5      5      2 5    5      5      5      5  32 5  ADDITIONAL PREVIOUS VISIT INFORMATION  VISITDATE   PRIMARY RX               DOSE      CrCl  12/12/2017  WARFARIN                 5mg  11/21/2017  WARFARIN                 5mg  11/7/2017   WARFARIN                 5mg  10/17/2017  WARFARIN                 5mg  OTHER CURRENT MEDICATIONS:  WARFARIN, WARFARIN  PROGRESS NOTES:   PATIENT INSTRUCTIONS: 12/12/17, tc pt, cont current dose, rech 3 weeks,1/3   bharathi  TEST LOCATION: [facility], , ,   INBOUND LAB DATA:  Lab       Lab Value Col Date                 Rpt Date                 Lab  Reference Range  Electronically signed by:  Nanette Robert on 12/12/2017 4:36 PM EST

## 2018-01-23 NOTE — PROGRESS NOTES
REPORT NAME: Progress Notes Report  VISIT DATE: 1/3/2018  VISIT TIME: 10:46 AM EST  PATIENT NAME: Hannah Solomon  MEDICAL RECORD NUMBER: [id number]  YOB: 1934  AGE: 80  REFERRING PHYSICIAN: [place] Santiago  SUPERVISING CLINICIAN: Mary Lou Pimentel MD  HEALTH CARE PROVIDER: Daiana Nixon  PATIENT HOME ADDRESS: [address] PHONE: [phone]  SOCIAL SECURITY NUMBER: [phone]  DIAGNOSIS 1: Presence of prosthetic heart valve / Z95 2  DIAGNOSIS 2:   INR RANGE: 2 5 - 3 5  INR GOAL: 3  TREATMENT START DATE:   TREATMENT END DATE:   NEXT VISIT: 1/17/2018  [facility]  VISIT RESULTS  ENCOUNTER NUMBER:   TEST LOCATION: [facility]  TEST TYPE: Outside Lab (Venipuncture)  VISIT TYPE: Phone Consult  CURRENT INR: 3 5 PROTIME: 35 7  SPECIMEN COL AND RPT DATE: 1/3/2018 10:46  AM EST  VITAL SIGNS  PULSE:  BP: / WEIGHT:  HEIGHT:  TEMP:   CURRENT ANTICOAGULANT DOSING SCHEDULE  DOSE SIZE: 5mg    ANTICOAGULANT TYPE: WARFARIN  DOSING REGIMEN  Sun       Mon       Tues      Wed       Thurs     Fri       Sat  Total/Wk  5         5         5         5         5         5         5         35  PATIENT MEDICATION INSTRUCTION: Yes  PATIENT NUTRITIONAL COUNSELING: No  PATIENT BRUISING INSTRUCTION: No  LAST EDUCATION DATE:   PREVIOUS VISIT INFORMATION  VISITDATE  INRGoal INR   Sun    Mon    Tues   Wed    Thurs  Fri    Sat  Total/wk  1/3/2018    3       3 5   5      5      5      5      5      5      5  35  12/12/2017  3       2 88  5      5      5      5      5      5      5  35  11/21/2017  3       2 66  5      5      5      5      5      5      5  35  11/7/2017   3       2 33  5      5      2 5    5      5      5      5  32 5  ADDITIONAL PREVIOUS VISIT INFORMATION  VISITDATE   PRIMARY RX               DOSE      CrCl  1/3/2018    WARFARIN                 5mg  12/12/2017  WARFARIN                 5mg  11/21/2017  WARFARIN                 5mg  11/7/2017   WARFARIN                 5mg  OTHER CURRENT MEDICATIONS:  WARFARIN, WARFARIN  PROGRESS NOTES:   PATIENT INSTRUCTIONS: 1/4/18, tc pt, cont current dose, rech 2 weeks, 1/17   bharathi  TEST LOCATION: [facility], , ,   INBOUND LAB DATA:  Lab       Lab Value Col Date                 Rpt Date                 Lab  Reference Range  Electronically signed by:  Maria Esther Robert on 1/4/2018 10:46 AM EST

## 2018-01-23 NOTE — PROGRESS NOTES
REPORT NAME: Progress Notes Report  VISIT DATE: 1/19/2018  VISIT TIME: 4:02 PM EST  PATIENT NAME: Jake Sprague  MEDICAL RECORD NUMBER: [id number]  YOB: 1934  AGE: 80  REFERRING PHYSICIAN: Nicole Gordillo  SUPERVISING CLINICIAN: [address] CARE PROVIDER: Ginna Pizano [facility]  PATIENT HOME ADDRESS: [address], [address]  PATIENT HOME PHONE: [phone]  SOCIAL SECURITY NUMBER:   DIAGNOSIS 1: Presence of prosthetic heart valve / Z95 2  DIAGNOSIS 2:   INR RANGE: 2 5 - 3 5  INR GOAL: 3  TREATMENT START DATE:   TREATMENT END DATE:   NEXT VISIT: 2/8/2018  [facility]  VISIT RESULTS  ENCOUNTER NUMBER:   TEST LOCATION: [facility]  TEST TYPE: Outside Lab (Venipuncture)  VISIT TYPE: Phone Consult  CURRENT INR: 2 61 PROTIME: 28 3  SPECIMEN COL AND RPT DATE: 1/19/2018 4:02  PM EST  VITAL SIGNS  PULSE:  BP: / WEIGHT:  HEIGHT:  TEMP:   CURRENT ANTICOAGULANT DOSING SCHEDULE  DOSE SIZE: 5mg    ANTICOAGULANT TYPE: WARFARIN  DOSING REGIMEN  Sun       Mon Tues Wed Thurs Fri       Sat  Total/Wk  5         5         5         5         5         5         5         35  PATIENT MEDICATION INSTRUCTION: Yes  PATIENT NUTRITIONAL COUNSELING: No  PATIENT BRUISING INSTRUCTION: No  LAST EDUCATION DATE:   PREVIOUS VISIT INFORMATION  VISITDATE  INRGoal INR   Sun    Mon    Tues   Wed    Thurs  Fri    Sat  Total/wk  1/19/2018   3       2 61  5      5      5      5      5      5      5  35  1/3/2018    3       3 5   5      5      5      5      5      5      5  35  12/12/2017  3       2 88  5      5      5      5      5      5      5  35  11/21/2017  3       2 66  5      5      5      5      5      5      5  35  ADDITIONAL PREVIOUS VISIT INFORMATION  VISITDATE   PRIMARY RX               DOSE      CrCl  1/19/2018   WARFARIN                 5mg  1/3/2018    WARFARIN                 5mg  12/12/2017  WARFARIN                 5mg  11/21/2017  WARFARIN                 5mg  OTHER CURRENT MEDICATIONS:  WARFARIN, WARFARIN  PROGRESS NOTES:   PATIENT INSTRUCTIONS: 1/19/18, tc pt, cont current dose, rech 3 weeks, 2/8   bharathi  TEST LOCATION: [facility], , ,   INBOUND LAB DATA:  Lab       Lab Value Col Date                 Rpt Date                 Lab  Reference Range  Electronically signed by:  Carri Robert on 1/19/2018 4:02 PM EST

## 2018-01-23 NOTE — RESULT NOTES
Verified Results  (1) PT WITH INR [id number] 09:54AM Georgiana Perla Order Number: [id number]     Test Name Result Flag Reference   INR 3 50 H 0 86-1 16   PT 35 7 seconds H 12 1-14 4

## 2018-01-24 ENCOUNTER — OFFICE VISIT (OUTPATIENT)
Dept: CARDIOLOGY CLINIC | Facility: CLINIC | Age: 83
End: 2018-01-24
Payer: MEDICARE

## 2018-01-24 ENCOUNTER — HOSPITAL ENCOUNTER (OUTPATIENT)
Dept: NON INVASIVE DIAGNOSTICS | Facility: CLINIC | Age: 83
Discharge: HOME | End: 2018-01-24
Payer: MEDICARE

## 2018-01-24 VITALS
WEIGHT: 164 LBS | SYSTOLIC BLOOD PRESSURE: 138 MMHG | HEIGHT: 64 IN | RESPIRATION RATE: 16 BRPM | BODY MASS INDEX: 28 KG/M2 | DIASTOLIC BLOOD PRESSURE: 70 MMHG

## 2018-01-24 DIAGNOSIS — I48.19 PERSISTENT ATRIAL FIBRILLATION (HCC): ICD-10-CM

## 2018-01-24 DIAGNOSIS — Z95.2 H/O MITRAL VALVE REPLACEMENT WITH MECHANICAL VALVE: ICD-10-CM

## 2018-01-24 DIAGNOSIS — E78.5 HYPERLIPIDEMIA: ICD-10-CM

## 2018-01-24 DIAGNOSIS — I48.91 ATRIAL FIBRILLATION, UNSPECIFIED TYPE (HCC): Primary | ICD-10-CM

## 2018-01-24 DIAGNOSIS — E78.5 DYSLIPIDEMIA: ICD-10-CM

## 2018-01-24 DIAGNOSIS — I10 ESSENTIAL HYPERTENSION: ICD-10-CM

## 2018-01-24 PROCEDURE — 93306 TTE W/DOPPLER COMPLETE: CPT

## 2018-01-24 PROCEDURE — 99214 OFFICE O/P EST MOD 30 MIN: CPT | Performed by: INTERNAL MEDICINE

## 2018-01-24 PROCEDURE — 93306 TTE W/DOPPLER COMPLETE: CPT | Performed by: INTERNAL MEDICINE

## 2018-01-24 PROCEDURE — 93000 ELECTROCARDIOGRAM COMPLETE: CPT | Performed by: INTERNAL MEDICINE

## 2018-01-24 RX ORDER — WARFARIN SODIUM 5 MG/1
5 TABLET ORAL
Qty: 90 TABLET | Refills: 10
Start: 2018-01-24 | End: 2018-02-14 | Stop reason: SDUPTHER

## 2018-01-24 NOTE — PROGRESS NOTES
Cardiology Follow Up    Melissa Thomas  1934  [id number]  [address]  [address]    1  Atrial fibrillation, unspecified type (HCC)  POCT ECG    warfarin (COUMADIN) 5 mg tablet   2  Persistent atrial fibrillation ([address] )     3  H/O mitral valve replacement with mechanical valve     4  Essential hypertension     5  Dyslipidemia         Interval History: This is a very pleasant 70-year-old female with a significant history of infectious endocarditis in 1986, possibly in the setting of mitral valve prolapse, who initially underwent mitral valve replacement with a bioprosthesis  This failed after about 10 years later, and in 1997 she received a [employer] mechanical mitral valve prosthesis  She also has a history of paroxysmal atrial fibrillation, lasting by Holter monitoring in 2009  She has been followed by Dr Elio Warner in our [place] office all these years, and then switched over to our office  Her last echocardiogram in June 2016 revealed a stable mechanical mitral valve prosthesis with a normal ejection fraction at 55-60% with mild to moderate biatrial dilatation  There was a small echodensity prolapsing back from the AV, possibly residual surgical material     From a symptomatic standpoint she feels very well with no exertional chest pain, shortness of breath, fevers or chills  She gets occasional lower extremity edema when she sits for prolonged period of time  She tolerates warfarin fairly well  She has mild LE edema that is somewhat chronic, worse when she keeps her feet dangling for a prolonged amount of time  She states compression stockings have helped  There is no problem list on file for this patient      Past Medical History:   Diagnosis Date    Chronic pain disorder     back    Diabetes mellitus ([address] )     Hypertension     Spinal stenosis      Social History     Social History    Marital status:      Spouse name: N/A    Number of children: N/A    Years of education: N/A     Occupational History    Not on file  Social History Main Topics    Smoking status: Never Smoker    Smokeless tobacco: Not on file    Alcohol use No    Drug use: No    Sexual activity: Not on file     Other Topics Concern    Not on file     Social History Narrative    No narrative on file      No family history on file  Past Surgical History:   Procedure Laterality Date    BACK SURGERY      CHOLECYSTECTOMY      FOOT SURGERY Right     UMBILICAL HERNIA REPAIR N/A 3/17/2017    Procedure: REPAIR HERNIA UMBILICAL;  Surgeon: Toño Linda DO;  Location: [facility];  Service:     VALVE REPLACEMENT         Current Outpatient Prescriptions:     atenolol (TENORMIN) 50 mg tablet, Take 1 tablet by mouth daily, Disp: , Rfl:     enoxaparin (LOVENOX) 80 mg/0 8 mL, Inject under the skin, Disp: , Rfl:     gabapentin (NEURONTIN) 300 mg capsule, Take by mouth, Disp: , Rfl:     losartan-hydrochlorothiazide (HYZAAR) 100-12 5 MG per tablet, Take 1 tablet by mouth daily, Disp: , Rfl:     metFORMIN (GLUCOPHAGE-XR) 500 mg 24 hr tablet, Take 1 tablet by mouth daily, Disp: , Rfl:     pravastatin (PRAVACHOL) 40 mg tablet, Take 1 tablet by mouth daily, Disp: , Rfl:     traMADol (ULTRAM) 50 mg tablet, Take by mouth, Disp: , Rfl:     warfarin (COUMADIN) 5 mg tablet, Take 1 tablet by mouth daily, Disp: 90 tablet, Rfl: 10    atenolol (TENORMIN) 50 mg tablet, Take 50 mg by mouth daily, Disp: , Rfl:   Allergies   Allergen Reactions    Lisinopril      cough       Labs:   Anticoag visit on 01/19/2018   Component Date Value    INR 01/03/2018 3 50*   Transcribe Orders on 01/19/2018   Component Date Value    Protime 01/19/2018 28 3*    INR 01/19/2018 2 61*   Appointment on 01/03/2018   Component Date Value    Protime 01/03/2018 35 7*    INR 01/03/2018 3 50*   Transcribe Orders on 12/11/2017   Component Date Value    Protime 12/11/2017 30 6*    INR 12/11/2017 2 88*     Imaging: No results found  Review of Systems:  Review of Systems   Respiratory: Negative for apnea, cough, choking, chest tightness, shortness of breath, wheezing and stridor  Cardiovascular: Positive for leg swelling  Negative for chest pain and palpitations  All other systems reviewed and are negative  Physical Exam:  Physical Exam   Constitutional: She is oriented to person, place, and time  She appears well-developed and well-nourished  HENT:   Head: Normocephalic and atraumatic  Eyes: Conjunctivae and EOM are normal  Pupils are equal, round, and reactive to light  Neck: Normal range of motion  Neck supple  Cardiovascular: Normal rate, regular rhythm and intact distal pulses  S1 click without murmur   Pulmonary/Chest: Effort normal and breath sounds normal    Abdominal: Soft  Bowel sounds are normal    Musculoskeletal: Normal range of motion  Trace LE edema   Neurological: She is alert and oriented to person, place, and time  She has normal reflexes  Skin: Skin is warm and dry  Psychiatric: She has a normal mood and affect  Her behavior is normal  Judgment and thought content normal        Discussion/Summary:  1  History of mechanical mitral valve replacement: Patient is tolerating warfarin well, reviewed recent INR results, mostly therapeutic  No abnormal bleeding or heavy bruising  No murmur on exam   Echo today with normally functioning valve with normal gradients  Mobile density seen in echo adjacent to mitral valve annulus just under AV, likely residual surgical material   Same present in 2016  2  Hypertension: Well controlled, continue losartan, atenolol    3  LE edema: trace, stable, dependent  Continue compression stockings  Patient will call if any worsening  No s/s of CHF    4  PAF:  Rate controled Afib today, no symptoms  Continue atenolol, warfarin    5    Dyslipidemia:  Reviewed panel from 10/2017--controlled, con't pravastatin    F/U in 6 months

## 2018-01-26 ENCOUNTER — HOSPITAL ENCOUNTER (OUTPATIENT)
Dept: MAMMOGRAPHY | Facility: MEDICAL CENTER | Age: 83
Discharge: HOME | End: 2018-01-26
Payer: MEDICARE

## 2018-01-26 DIAGNOSIS — Z00.00 ENCOUNTER FOR GENERAL ADULT MEDICAL EXAMINATION WITHOUT ABNORMAL FINDINGS: ICD-10-CM

## 2018-01-26 DIAGNOSIS — Z12.31 ENCOUNTER FOR SCREENING MAMMOGRAM FOR BREAST CANCER: ICD-10-CM

## 2018-01-26 PROCEDURE — 77067 SCR MAMMO BI INCL CAD: CPT

## 2018-01-29 DIAGNOSIS — I10 ESSENTIAL HYPERTENSION: Primary | ICD-10-CM

## 2018-01-30 RX ORDER — ATENOLOL 50 MG/1
TABLET ORAL
Qty: 90 TABLET | Refills: 3 | Status: SHIPPED | OUTPATIENT
Start: 2018-01-30 | End: 2018-02-02

## 2018-02-02 DIAGNOSIS — I10 HYPERTENSION, UNSPECIFIED TYPE: Primary | ICD-10-CM

## 2018-02-02 RX ORDER — ATENOLOL 50 MG/1
50 TABLET ORAL DAILY
Qty: 90 TABLET | Refills: 3 | Status: SHIPPED | OUTPATIENT
Start: 2018-02-02 | End: 2018-05-09 | Stop reason: SDUPTHER

## 2018-02-12 DIAGNOSIS — E78.2 MIXED HYPERLIPIDEMIA: Primary | ICD-10-CM

## 2018-02-13 RX ORDER — PRAVASTATIN SODIUM 40 MG
TABLET ORAL
Qty: 90 TABLET | Refills: 3 | Status: SHIPPED | OUTPATIENT
Start: 2018-02-13 | End: 2018-11-19 | Stop reason: SDUPTHER

## 2018-02-14 DIAGNOSIS — I48.91 ATRIAL FIBRILLATION, UNSPECIFIED TYPE (HCC): ICD-10-CM

## 2018-02-14 RX ORDER — WARFARIN SODIUM 5 MG/1
5 TABLET ORAL
Qty: 90 TABLET | Refills: 3 | Status: SHIPPED | OUTPATIENT
Start: 2018-02-14 | End: 2018-11-19 | Stop reason: SDUPTHER

## 2018-02-20 ENCOUNTER — TRANSCRIBE ORDERS (OUTPATIENT)
Dept: LAB | Facility: CLINIC | Age: 83
End: 2018-02-20

## 2018-02-20 ENCOUNTER — ANTICOAG VISIT (OUTPATIENT)
Dept: CARDIOLOGY CLINIC | Facility: CLINIC | Age: 83
End: 2018-02-20

## 2018-02-20 ENCOUNTER — APPOINTMENT (OUTPATIENT)
Dept: LAB | Facility: CLINIC | Age: 83
End: 2018-02-20
Payer: MEDICARE

## 2018-02-20 PROCEDURE — 36415 COLL VENOUS BLD VENIPUNCTURE: CPT

## 2018-02-20 PROCEDURE — 85610 PROTHROMBIN TIME: CPT

## 2018-02-25 DIAGNOSIS — I50.9 HEART FAILURE (HCC): ICD-10-CM

## 2018-02-25 DIAGNOSIS — I48.91 ATRIAL FIBRILLATION (HCC): ICD-10-CM

## 2018-02-25 DIAGNOSIS — M46.1 SACROILIITIS, NOT ELSEWHERE CLASSIFIED (HCC): ICD-10-CM

## 2018-02-25 DIAGNOSIS — E11.9 TYPE 2 DIABETES MELLITUS WITHOUT COMPLICATIONS (HCC): ICD-10-CM

## 2018-02-25 DIAGNOSIS — I10 ESSENTIAL (PRIMARY) HYPERTENSION: ICD-10-CM

## 2018-02-25 DIAGNOSIS — E78.5 HYPERLIPIDEMIA: ICD-10-CM

## 2018-02-26 ENCOUNTER — APPOINTMENT (OUTPATIENT)
Dept: LAB | Facility: CLINIC | Age: 83
End: 2018-02-26
Payer: MEDICARE

## 2018-02-26 DIAGNOSIS — E78.5 HYPERLIPIDEMIA: ICD-10-CM

## 2018-02-26 DIAGNOSIS — I50.9 HEART FAILURE (HCC): ICD-10-CM

## 2018-02-26 DIAGNOSIS — E11.9 TYPE 2 DIABETES MELLITUS WITHOUT COMPLICATIONS (HCC): ICD-10-CM

## 2018-02-26 DIAGNOSIS — M46.1 SACROILIITIS, NOT ELSEWHERE CLASSIFIED (HCC): ICD-10-CM

## 2018-02-26 DIAGNOSIS — I48.91 ATRIAL FIBRILLATION (HCC): ICD-10-CM

## 2018-02-26 DIAGNOSIS — I10 ESSENTIAL (PRIMARY) HYPERTENSION: ICD-10-CM

## 2018-02-26 LAB
ALBUMIN SERPL BCP-MCNC: 3.5 G/DL (ref 3.5–5)
ALP SERPL-CCNC: 48 U/L (ref 46–116)
ALT SERPL W P-5'-P-CCNC: 22 U/L (ref 12–78)
ANION GAP SERPL CALCULATED.3IONS-SCNC: 5 MMOL/L (ref 4–13)
AST SERPL W P-5'-P-CCNC: 16 U/L (ref 5–45)
BASOPHILS # BLD AUTO: 0.02 THOUSANDS/ΜL (ref 0–0.1)
BASOPHILS NFR BLD AUTO: 0 % (ref 0–1)
BILIRUB SERPL-MCNC: 0.82 MG/DL (ref 0.2–1)
BUN SERPL-MCNC: 24 MG/DL (ref 5–25)
CALCIUM SERPL-MCNC: 9 MG/DL (ref 8.3–10.1)
CHLORIDE SERPL-SCNC: 104 MMOL/L (ref 100–108)
CHOLEST SERPL-MCNC: 161 MG/DL (ref 50–200)
CO2 SERPL-SCNC: 33 MMOL/L (ref 21–32)
CREAT SERPL-MCNC: 0.7 MG/DL (ref 0.6–1.3)
EOSINOPHIL # BLD AUTO: 0.1 THOUSAND/ΜL (ref 0–0.61)
EOSINOPHIL NFR BLD AUTO: 1 % (ref 0–6)
ERYTHROCYTE [DISTWIDTH] IN BLOOD BY AUTOMATED COUNT: 13.3 % (ref 11.6–15.1)
EST. AVERAGE GLUCOSE BLD GHB EST-MCNC: 111 MG/DL
GFR SERPL CREATININE-BSD FRML MDRD: 80 ML/MIN/1.73SQ M
GLUCOSE P FAST SERPL-MCNC: 101 MG/DL (ref 65–99)
HBA1C MFR BLD: 5.5 % (ref 4.2–6.3)
HCT VFR BLD AUTO: 41.7 % (ref 34.8–46.1)
HDLC SERPL-MCNC: 54 MG/DL (ref 40–60)
HGB BLD-MCNC: 13.7 G/DL (ref 11.5–15.4)
LDLC SERPL CALC-MCNC: 86 MG/DL (ref 0–100)
LYMPHOCYTES # BLD AUTO: 1.5 THOUSANDS/ΜL (ref 0.6–4.47)
LYMPHOCYTES NFR BLD AUTO: 22 % (ref 14–44)
MCH RBC QN AUTO: 30 PG (ref 26.8–34.3)
MCHC RBC AUTO-ENTMCNC: 32.9 G/DL (ref 31.4–37.4)
MCV RBC AUTO: 91 FL (ref 82–98)
MONOCYTES # BLD AUTO: 0.56 THOUSAND/ΜL (ref 0.17–1.22)
MONOCYTES NFR BLD AUTO: 8 % (ref 4–12)
NEUTROPHILS # BLD AUTO: 4.79 THOUSANDS/ΜL (ref 1.85–7.62)
NEUTS SEG NFR BLD AUTO: 69 % (ref 43–75)
NRBC BLD AUTO-RTO: 0 /100 WBCS
PLATELET # BLD AUTO: 272 THOUSANDS/UL (ref 149–390)
PMV BLD AUTO: 10.7 FL (ref 8.9–12.7)
POTASSIUM SERPL-SCNC: 3.3 MMOL/L (ref 3.5–5.3)
PROT SERPL-MCNC: 7.1 G/DL (ref 6.4–8.2)
RBC # BLD AUTO: 4.57 MILLION/UL (ref 3.81–5.12)
SODIUM SERPL-SCNC: 142 MMOL/L (ref 136–145)
TRIGL SERPL-MCNC: 105 MG/DL
TSH SERPL DL<=0.05 MIU/L-ACNC: 0.76 UIU/ML (ref 0.36–3.74)
WBC # BLD AUTO: 6.99 THOUSAND/UL (ref 4.31–10.16)

## 2018-02-26 PROCEDURE — 84443 ASSAY THYROID STIM HORMONE: CPT

## 2018-02-26 PROCEDURE — 80061 LIPID PANEL: CPT

## 2018-02-26 PROCEDURE — 83036 HEMOGLOBIN GLYCOSYLATED A1C: CPT

## 2018-02-26 PROCEDURE — 80053 COMPREHEN METABOLIC PANEL: CPT

## 2018-02-26 PROCEDURE — 36415 COLL VENOUS BLD VENIPUNCTURE: CPT

## 2018-02-26 PROCEDURE — 85025 COMPLETE CBC W/AUTO DIFF WBC: CPT

## 2018-03-06 ENCOUNTER — OFFICE VISIT (OUTPATIENT)
Dept: FAMILY MEDICINE CLINIC | Facility: CLINIC | Age: 83
End: 2018-03-06
Payer: MEDICARE

## 2018-03-06 VITALS
WEIGHT: 168.4 LBS | BODY MASS INDEX: 28.75 KG/M2 | DIASTOLIC BLOOD PRESSURE: 70 MMHG | HEART RATE: 80 BPM | HEIGHT: 64 IN | SYSTOLIC BLOOD PRESSURE: 106 MMHG

## 2018-03-06 DIAGNOSIS — M46.1 SACROILIITIS (HCC): ICD-10-CM

## 2018-03-06 DIAGNOSIS — E11.9 TYPE 2 DIABETES MELLITUS WITHOUT COMPLICATION, WITHOUT LONG-TERM CURRENT USE OF INSULIN (HCC): Primary | ICD-10-CM

## 2018-03-06 DIAGNOSIS — I50.9 CHRONIC CONGESTIVE HEART FAILURE, UNSPECIFIED CONGESTIVE HEART FAILURE TYPE: ICD-10-CM

## 2018-03-06 DIAGNOSIS — I48.91 ATRIAL FIBRILLATION, UNSPECIFIED TYPE (HCC): ICD-10-CM

## 2018-03-06 DIAGNOSIS — I10 HYPERTENSION, UNSPECIFIED TYPE: ICD-10-CM

## 2018-03-06 DIAGNOSIS — K62.5 BRIGHT RED RECTAL BLEEDING: ICD-10-CM

## 2018-03-06 PROBLEM — K64.9 HEMORRHOIDS: Status: ACTIVE | Noted: 2017-01-27

## 2018-03-06 PROCEDURE — 99214 OFFICE O/P EST MOD 30 MIN: CPT | Performed by: PHYSICIAN ASSISTANT

## 2018-03-06 RX ORDER — METFORMIN HYDROCHLORIDE 500 MG/1
500 TABLET, EXTENDED RELEASE ORAL DAILY
Qty: 90 TABLET | Refills: 3 | Status: SHIPPED | OUTPATIENT
Start: 2018-03-06 | End: 2018-12-19 | Stop reason: SDUPTHER

## 2018-03-06 RX ORDER — ATENOLOL 50 MG/1
50 TABLET ORAL DAILY
Qty: 90 TABLET | Refills: 3 | Status: SHIPPED | OUTPATIENT
Start: 2018-03-06 | End: 2019-01-21 | Stop reason: SDUPTHER

## 2018-03-06 NOTE — PROGRESS NOTES
Assessment/Plan:  Patient Instructions   1  Type 2 diabetes-patient is currently controlled with metformin 500 milligrams daily  Hemoglobin A1c was reviewed and is stable at 5 5  2   Congestive heart failure-stable per Dr Madelyn Hassan  3   Chronic atrial fibrillation-stable on warfarin therapy and with beta-blocker for rate control  4   Sacroiliitis-stable per pain management as necessary  5   Hypertension-stable on current regimen, continue Hyzaar  6   Hypokalemia-potassium has dropped to 3 3  Will reassess with next labs and encourage patient to start over-the-counter potassium supplementation  7   Rectal bleeding and history of hemorrhoids-patient follow up with Dr Samir Masters  Follow-up in 3-4 months with labs  No problem-specific Assessment & Plan notes found for this encounter  Diagnoses and all orders for this visit:    Type 2 diabetes mellitus without complication, without long-term current use of insulin (HCC)  -     metFORMIN (GLUCOPHAGE-XR) 500 mg 24 hr tablet; Take 1 tablet (500 mg total) by mouth daily  -     Comprehensive metabolic panel; Future  -     HEMOGLOBIN A1C W/ EAG ESTIMATION; Future  -     Lipid Panel with Direct LDL reflex; Future  -     TSH, 3rd generation with T4 reflex; Future    Chronic congestive heart failure, unspecified congestive heart failure type ([place] [address] )  -     Comprehensive metabolic panel; Future  -     Lipid Panel with Direct LDL reflex; Future  -     CBC and differential; Future    Atrial fibrillation, unspecified type ([place] [address] )  -     Comprehensive metabolic panel; Future  -     CBC and differential; Future  -     TSH, 3rd generation with T4 reflex; Future    Sacroiliitis (HCC)    Hypertension, unspecified type  -     atenolol (TENORMIN) 50 mg tablet; Take 1 tablet (50 mg total) by mouth daily  -     Comprehensive metabolic panel; Future  -     CBC and differential; Future    Bright red rectal bleeding  -     Ambulatory referral to Colorectal Surgery;  Future Subjective:   CC: Follow up to chronic conditions and review bw  Patient ID: Melissa Thomas is a 80 y o  female  This is an 80-year-old female who presents to the office for follow-up of chronic health conditions including congestive heart failure, atrial fibrillation, type 2 diabetes, hypertension, and sacroiliitis  She has been feeling well for the most part but she does mention that she has had some return of occasional bright red blood from the rectum  She has a history of hemorrhoids and has been following with Dr Reymundo Henley above for this  She has a feeling that she needs to get back in and see him  She has also had recent blood test which she would like to review  She has been taking metformin for her diabetes and has also been on beta-blocker therapy and warfarin for chronic atrial fibrillation  Her sacroiliitis has been managed by pain management  The following portions of the patient's history were reviewed and updated as appropriate: allergies, current medications, past family history, past medical history, past social history, past surgical history and problem list     Review of Systems   Constitutional: Negative for chills, fatigue and fever  HENT: Negative for congestion, ear pain and sinus pressure  Eyes: Negative for visual disturbance  Respiratory: Negative for cough, chest tightness and shortness of breath  Cardiovascular: Negative for chest pain and palpitations  Gastrointestinal: Negative for diarrhea, nausea and vomiting  Endocrine: Negative for polyuria  Genitourinary: Negative for dysuria and frequency  Musculoskeletal: Negative for arthralgias and myalgias  Skin: Negative for pallor and rash  Neurological: Negative for dizziness, weakness, light-headedness, numbness and headaches  Psychiatric/Behavioral: Negative for agitation, behavioral problems and sleep disturbance  All other systems reviewed and are negative          Objective:    Vitals: 03/06/18 1341   BP: 106/70   Pulse: 80   Weight: 76 4 kg (168 lb 6 4 oz)   Height: 5' 4" (1 626 m)     /70   Pulse 80   Ht 5' 4" (1 626 m)   Wt 76 4 kg (168 lb 6 4 oz)   BMI 28 91 kg/m²          Physical Exam   Constitutional: She is oriented to person, place, and time  She appears well-developed and well-nourished  No distress  HENT:   Head: Normocephalic and atraumatic  Right Ear: External ear normal    Left Ear: External ear normal    Nose: Nose normal    Mouth/Throat: Oropharynx is clear and moist  No oropharyngeal exudate  Eyes: Conjunctivae and EOM are normal  Pupils are equal, round, and reactive to light  Neck: Normal range of motion  Neck supple  No tracheal deviation present  No thyromegaly present  Cardiovascular: Normal rate, regular rhythm and normal heart sounds  Exam reveals no friction rub  No murmur heard  Pulmonary/Chest: Effort normal and breath sounds normal  No respiratory distress  She has no wheezes  She has no rales  Abdominal: Soft  Bowel sounds are normal  She exhibits no distension  There is no tenderness  There is no rebound and no guarding  Musculoskeletal: Normal range of motion  She exhibits no edema or tenderness  Lymphadenopathy:     She has no cervical adenopathy  Neurological: She is alert and oriented to person, place, and time  No cranial nerve deficit  Coordination normal    Skin: Skin is warm and dry  No rash noted  No erythema  Psychiatric: She has a normal mood and affect  Her behavior is normal  Thought content normal    Nursing note and vitals reviewed

## 2018-03-06 NOTE — PATIENT INSTRUCTIONS
1   Type 2 diabetes-patient is currently controlled with metformin 500 milligrams daily  Hemoglobin A1c was reviewed and is stable at 5 5  2   Congestive heart failure-stable per Dr Nelson Paez  3   Chronic atrial fibrillation-stable on warfarin therapy and with beta-blocker for rate control  4   Sacroiliitis-stable per pain management as necessary  5   Hypertension-stable on current regimen, continue Hyzaar  6   Hypokalemia-potassium has dropped to 3 3  Will reassess with next labs and encourage patient to start over-the-counter potassium supplementation  7   Rectal bleeding and history of hemorrhoids-patient follow up with Dr Miladys Nugent  Follow-up in 3-4 months with labs

## 2018-03-19 ENCOUNTER — ANTICOAG VISIT (OUTPATIENT)
Dept: CARDIOLOGY CLINIC | Facility: CLINIC | Age: 83
End: 2018-03-19

## 2018-03-19 ENCOUNTER — APPOINTMENT (OUTPATIENT)
Dept: LAB | Facility: CLINIC | Age: 83
End: 2018-03-19
Payer: MEDICARE

## 2018-03-19 PROCEDURE — 36415 COLL VENOUS BLD VENIPUNCTURE: CPT

## 2018-03-19 PROCEDURE — 85610 PROTHROMBIN TIME: CPT

## 2018-04-17 ENCOUNTER — APPOINTMENT (OUTPATIENT)
Dept: LAB | Facility: CLINIC | Age: 83
End: 2018-04-17
Payer: MEDICARE

## 2018-04-17 ENCOUNTER — ANTICOAG VISIT (OUTPATIENT)
Dept: CARDIOLOGY CLINIC | Facility: CLINIC | Age: 83
End: 2018-04-17

## 2018-04-17 ENCOUNTER — TRANSCRIBE ORDERS (OUTPATIENT)
Dept: LAB | Facility: CLINIC | Age: 83
End: 2018-04-17

## 2018-04-17 PROCEDURE — 85610 PROTHROMBIN TIME: CPT

## 2018-04-17 PROCEDURE — 36415 COLL VENOUS BLD VENIPUNCTURE: CPT

## 2018-05-09 ENCOUNTER — OFFICE VISIT (OUTPATIENT)
Dept: PAIN MEDICINE | Facility: MEDICAL CENTER | Age: 83
End: 2018-05-09
Payer: MEDICARE

## 2018-05-09 VITALS
DIASTOLIC BLOOD PRESSURE: 72 MMHG | HEART RATE: 72 BPM | RESPIRATION RATE: 14 BRPM | BODY MASS INDEX: 28.41 KG/M2 | HEIGHT: 64 IN | WEIGHT: 166.4 LBS | SYSTOLIC BLOOD PRESSURE: 116 MMHG

## 2018-05-09 DIAGNOSIS — M46.1 SACROILIITIS (HCC): ICD-10-CM

## 2018-05-09 DIAGNOSIS — M54.42 CHRONIC BILATERAL LOW BACK PAIN WITH BILATERAL SCIATICA: ICD-10-CM

## 2018-05-09 DIAGNOSIS — M79.18 MYOFASCIAL PAIN SYNDROME: ICD-10-CM

## 2018-05-09 DIAGNOSIS — G89.4 PAIN SYNDROME, CHRONIC: ICD-10-CM

## 2018-05-09 DIAGNOSIS — M54.16 CHRONIC LUMBAR RADICULOPATHY: Primary | ICD-10-CM

## 2018-05-09 DIAGNOSIS — M54.41 CHRONIC BILATERAL LOW BACK PAIN WITH BILATERAL SCIATICA: ICD-10-CM

## 2018-05-09 DIAGNOSIS — G89.29 CHRONIC BILATERAL LOW BACK PAIN WITH BILATERAL SCIATICA: ICD-10-CM

## 2018-05-09 PROCEDURE — 99214 OFFICE O/P EST MOD 30 MIN: CPT | Performed by: NURSE PRACTITIONER

## 2018-05-09 RX ORDER — TRAMADOL HYDROCHLORIDE 50 MG/1
50 TABLET ORAL DAILY PRN
Qty: 30 TABLET | Refills: 1 | Status: SHIPPED | OUTPATIENT
Start: 2018-05-09 | End: 2018-08-01 | Stop reason: SDUPTHER

## 2018-05-09 NOTE — PROGRESS NOTES
Assessment:  1  Chronic lumbar radiculopathy    2  Myofascial pain syndrome    3  Sacroiliitis (HCC)    4  Pain syndrome, chronic    5  Chronic bilateral low back pain with bilateral sciatica        Plan: At this time, the patient can continue with tramadol and gabapentin as prescribed  She does not require a refill of gabapentin at this time  I did refill her tramadol 30 tablets with 1 refill  The patient only uses this as needed so this will last her until her follow-up in 3 months  There are risks associated with opioid medications, including dependence, addiction and tolerance  The patient understands and agrees to use these medications only as prescribed  Potential side effects of the medications include, but are not limited to, constipation, drowsiness, addiction, impaired judgment and risk of fatal overdose if not taken as prescribed  The patient was warned against driving while taking sedation medications  Sharing medications is a felony  At this point in time, the patient is showing no signs of addiction, abuse, diversion or suicidal ideation  [place] Prescription Drug Monitoring Program report was reviewed and was appropriate         My impressions and treatment recommendations were discussed in detail with the patient who verbalized understanding and had no further questions  Discharge instructions were provided  I personally saw and examined the patient and I agree with the above discussed plan of care  No orders of the defined types were placed in this encounter  New Medications Ordered This Visit   Medications    traMADol (ULTRAM) 50 mg tablet     Sig: Take 1 tablet (50 mg total) by mouth daily as needed for moderate pain     Dispense:  30 tablet     Refill:  1       History of Present Illness:    Maia Cerna is a 80 y o  female who presents for a follow-up office visit related to her low back and bilateral leg pain  Her pain is rated 5/10 today    This is intermittent in nature most bothersome in the morning  She describes her pain as dull, aching, and pressure-like  Patient takes tramadol 50 mg only on it very as-needed basis usually if she has to go out somewhere for the day  She also takes gabapentin 300 mg 2 tablets 3 times daily  I have personally reviewed and/or updated the patient's past medical history, past surgical history, family history, social history, current medications, allergies, and vital signs today  Review of Systems:    Review of Systems   Respiratory: Negative for shortness of breath  Cardiovascular: Negative for chest pain  Gastrointestinal: Positive for constipation  Negative for diarrhea, nausea and vomiting  Musculoskeletal: Positive for back pain, joint swelling and neck pain  Negative for arthralgias, gait problem and myalgias  Skin: Negative for rash  Neurological: Negative for dizziness, seizures and weakness  All other systems reviewed and are negative        Patient Active Problem List   Diagnosis    Arthritis    Atrial fibrillation (HCC)    Chronic congestive heart failure (HCC)    Chronic lumbar radiculopathy    Coagulation defect (HCC)    Hemorrhoids    History of mitral valve replacement    Hyperlipidemia    Hypertension    Myofascial pain syndrome    Osteopenia    Pain syndrome, chronic    Sacroiliitis (HCC)    Type 2 diabetes mellitus ([address] )    Vitamin D deficiency       Past Medical History:   Diagnosis Date    Arthritis     Benign polyp of large intestine     Chronic pain disorder     bilateral back - last assessed 12/27/17    Diabetes mellitus ([address] )     Endocarditis     Hypertension     Hypertension     Spinal stenosis     Stroke syndrome ([address] ) 1985       Past Surgical History:   Procedure Laterality Date    BACK SURGERY      CARPAL TUNNEL RELEASE Left     CATARACT EXTRACTION, BILATERAL  2011    CHOLECYSTECTOMY      FOOT SURGERY Right     GALLBLADDER SURGERY      LUMBAR LAMINECTOMY Right 11/08/2012    L3-4 min  invasive far lateral microdiscectomy for decompression of nerve root, transforaminal epidural steroid injection; operating room microscope for microdissection    MITRAL VALVE REPLACEMENT      biolical valve failed [address] [facility] [address] N/A 3/17/2017    Procedure: REPAIR HERNIA UMBILICAL;  Surgeon: Danny Way DO;  [facility];  Service:     VALVE REPLACEMENT         Family History   Problem Relation Age of Onset    Clotting disorder Mother      disorder of blood and blood forming organs    Heart attack Father     Cancer Maternal Grandfather     Heart disease Paternal Grandfather     Diabetes Family        Social History     Occupational History    Retired      Social History Main Topics    Smoking status: Never Smoker    Smokeless tobacco: Never Used    Alcohol use No    Drug use: No    Sexual activity: Not on file       Current Outpatient Prescriptions on File Prior to Visit   Medication Sig    atenolol (TENORMIN) 50 mg tablet Take 1 tablet (50 mg total) by mouth daily    enoxaparin (LOVENOX) 80 mg/0 8 mL Inject under the skin    gabapentin (NEURONTIN) 300 mg capsule Take by mouth    losartan-hydrochlorothiazide (HYZAAR) 100-12 5 MG per tablet Take 1 tablet by mouth daily    metFORMIN (GLUCOPHAGE-XR) 500 mg 24 hr tablet Take 1 tablet (500 mg total) by mouth daily    pravastatin (PRAVACHOL) 40 mg tablet TAKE 1 TABLET EVERY DAY    warfarin (COUMADIN) 5 mg tablet Take 1 tablet (5 mg total) by mouth daily    [DISCONTINUED] traMADol (ULTRAM) 50 mg tablet Take by mouth    [DISCONTINUED] atenolol (TENORMIN) 50 mg tablet Take 1 tablet (50 mg total) by mouth daily     No current facility-administered medications on file prior to visit          Allergies   Allergen Reactions    Lisinopril      cough       Physical Exam:    /72   Pulse 72   Resp 14   Ht 5' 4" (1 626 m)   Wt 75 5 kg (166 lb 6 4 oz)   BMI 28 56 kg/m²     Constitutional: normal, well developed, well nourished, alert, in no distress and non-toxic and no overt pain behavior    Eyes: anicteric  HEENT: grossly intact  Neck: supple, symmetric, trachea midline and no masses   Pulmonary:even and unlabored  Cardiovascular:No edema or pitting edema present  Skin:Normal without rashes or lesions and well hydrated  Psychiatric:Mood and affect appropriate  Neurologic:Cranial Nerves II-XII grossly intact  Musculoskeletal:ambulates with cane    Imaging

## 2018-05-15 ENCOUNTER — ANTICOAG VISIT (OUTPATIENT)
Dept: CARDIOLOGY CLINIC | Facility: CLINIC | Age: 83
End: 2018-05-15

## 2018-05-15 ENCOUNTER — APPOINTMENT (OUTPATIENT)
Dept: LAB | Facility: CLINIC | Age: 83
End: 2018-05-15
Payer: MEDICARE

## 2018-05-29 ENCOUNTER — ANTICOAG VISIT (OUTPATIENT)
Dept: CARDIOLOGY CLINIC | Facility: CLINIC | Age: 83
End: 2018-05-29

## 2018-05-29 ENCOUNTER — APPOINTMENT (OUTPATIENT)
Dept: LAB | Facility: CLINIC | Age: 83
End: 2018-05-29
Payer: MEDICARE

## 2018-05-29 ENCOUNTER — TRANSCRIBE ORDERS (OUTPATIENT)
Dept: LAB | Facility: CLINIC | Age: 83
End: 2018-05-29

## 2018-06-06 ENCOUNTER — APPOINTMENT (OUTPATIENT)
Dept: LAB | Facility: CLINIC | Age: 83
End: 2018-06-06
Payer: MEDICARE

## 2018-06-06 ENCOUNTER — ANTICOAG VISIT (OUTPATIENT)
Dept: CARDIOLOGY CLINIC | Facility: CLINIC | Age: 83
End: 2018-06-06

## 2018-06-06 DIAGNOSIS — I10 HYPERTENSION, UNSPECIFIED TYPE: ICD-10-CM

## 2018-06-06 DIAGNOSIS — E11.9 TYPE 2 DIABETES MELLITUS WITHOUT COMPLICATION, WITHOUT LONG-TERM CURRENT USE OF INSULIN (HCC): ICD-10-CM

## 2018-06-06 DIAGNOSIS — I50.9 CHRONIC CONGESTIVE HEART FAILURE (HCC): ICD-10-CM

## 2018-06-06 DIAGNOSIS — I48.91 ATRIAL FIBRILLATION, UNSPECIFIED TYPE (HCC): ICD-10-CM

## 2018-06-06 LAB
ALBUMIN SERPL BCP-MCNC: 3.6 G/DL (ref 3.5–5)
ALP SERPL-CCNC: 49 U/L (ref 46–116)
ALT SERPL W P-5'-P-CCNC: 24 U/L (ref 12–78)
ANION GAP SERPL CALCULATED.3IONS-SCNC: 9 MMOL/L (ref 4–13)
AST SERPL W P-5'-P-CCNC: 17 U/L (ref 5–45)
BASOPHILS # BLD AUTO: 0.03 THOUSANDS/ΜL (ref 0–0.1)
BASOPHILS NFR BLD AUTO: 1 % (ref 0–1)
BILIRUB SERPL-MCNC: 0.73 MG/DL (ref 0.2–1)
BUN SERPL-MCNC: 19 MG/DL (ref 5–25)
CALCIUM SERPL-MCNC: 9.2 MG/DL (ref 8.3–10.1)
CHLORIDE SERPL-SCNC: 103 MMOL/L (ref 100–108)
CHOLEST SERPL-MCNC: 118 MG/DL (ref 50–200)
CO2 SERPL-SCNC: 29 MMOL/L (ref 21–32)
CREAT SERPL-MCNC: 0.7 MG/DL (ref 0.6–1.3)
EOSINOPHIL # BLD AUTO: 0.08 THOUSAND/ΜL (ref 0–0.61)
EOSINOPHIL NFR BLD AUTO: 1 % (ref 0–6)
ERYTHROCYTE [DISTWIDTH] IN BLOOD BY AUTOMATED COUNT: 13.2 % (ref 11.6–15.1)
EST. AVERAGE GLUCOSE BLD GHB EST-MCNC: 120 MG/DL
GFR SERPL CREATININE-BSD FRML MDRD: 80 ML/MIN/1.73SQ M
GLUCOSE P FAST SERPL-MCNC: 114 MG/DL (ref 65–99)
HBA1C MFR BLD: 5.8 % (ref 4.2–6.3)
HCT VFR BLD AUTO: 41.7 % (ref 34.8–46.1)
HDLC SERPL-MCNC: 45 MG/DL (ref 40–60)
HGB BLD-MCNC: 13.5 G/DL (ref 11.5–15.4)
IMM GRANULOCYTES # BLD AUTO: 0.02 THOUSAND/UL (ref 0–0.2)
IMM GRANULOCYTES NFR BLD AUTO: 0 % (ref 0–2)
LDLC SERPL CALC-MCNC: 56 MG/DL (ref 0–100)
LYMPHOCYTES # BLD AUTO: 1.33 THOUSANDS/ΜL (ref 0.6–4.47)
LYMPHOCYTES NFR BLD AUTO: 21 % (ref 14–44)
MCH RBC QN AUTO: 30.3 PG (ref 26.8–34.3)
MCHC RBC AUTO-ENTMCNC: 32.4 G/DL (ref 31.4–37.4)
MCV RBC AUTO: 94 FL (ref 82–98)
MONOCYTES # BLD AUTO: 0.48 THOUSAND/ΜL (ref 0.17–1.22)
MONOCYTES NFR BLD AUTO: 7 % (ref 4–12)
NEUTROPHILS # BLD AUTO: 4.52 THOUSANDS/ΜL (ref 1.85–7.62)
NEUTS SEG NFR BLD AUTO: 70 % (ref 43–75)
NRBC BLD AUTO-RTO: 0 /100 WBCS
PLATELET # BLD AUTO: 270 THOUSANDS/UL (ref 149–390)
PMV BLD AUTO: 10.9 FL (ref 8.9–12.7)
POTASSIUM SERPL-SCNC: 3.3 MMOL/L (ref 3.5–5.3)
PROT SERPL-MCNC: 6.9 G/DL (ref 6.4–8.2)
RBC # BLD AUTO: 4.46 MILLION/UL (ref 3.81–5.12)
SODIUM SERPL-SCNC: 141 MMOL/L (ref 136–145)
TRIGL SERPL-MCNC: 87 MG/DL
TSH SERPL DL<=0.05 MIU/L-ACNC: 0.56 UIU/ML (ref 0.36–3.74)
WBC # BLD AUTO: 6.46 THOUSAND/UL (ref 4.31–10.16)

## 2018-06-06 PROCEDURE — 83036 HEMOGLOBIN GLYCOSYLATED A1C: CPT

## 2018-06-06 PROCEDURE — 80061 LIPID PANEL: CPT

## 2018-06-06 PROCEDURE — 84443 ASSAY THYROID STIM HORMONE: CPT

## 2018-06-06 PROCEDURE — 80053 COMPREHEN METABOLIC PANEL: CPT

## 2018-06-06 PROCEDURE — 85025 COMPLETE CBC W/AUTO DIFF WBC: CPT

## 2018-06-18 ENCOUNTER — TRANSCRIBE ORDERS (OUTPATIENT)
Dept: LAB | Facility: CLINIC | Age: 83
End: 2018-06-18

## 2018-06-18 ENCOUNTER — APPOINTMENT (OUTPATIENT)
Dept: LAB | Facility: CLINIC | Age: 83
End: 2018-06-18
Payer: MEDICARE

## 2018-06-18 ENCOUNTER — ANTICOAG VISIT (OUTPATIENT)
Dept: CARDIOLOGY CLINIC | Facility: CLINIC | Age: 83
End: 2018-06-18

## 2018-06-18 LAB
LEFT EYE DIABETIC RETINOPATHY: NORMAL
RIGHT EYE DIABETIC RETINOPATHY: NORMAL
SEVERITY (EYE EXAM): NORMAL

## 2018-06-25 ENCOUNTER — APPOINTMENT (OUTPATIENT)
Dept: LAB | Facility: CLINIC | Age: 83
End: 2018-06-25
Payer: MEDICARE

## 2018-06-25 ENCOUNTER — ANTICOAG VISIT (OUTPATIENT)
Dept: CARDIOLOGY CLINIC | Facility: CLINIC | Age: 83
End: 2018-06-25

## 2018-06-25 DIAGNOSIS — I10 ESSENTIAL HYPERTENSION: Primary | ICD-10-CM

## 2018-06-25 RX ORDER — LOSARTAN POTASSIUM AND HYDROCHLOROTHIAZIDE 12.5; 1 MG/1; MG/1
TABLET ORAL
Qty: 90 TABLET | Refills: 3 | Status: SHIPPED | OUTPATIENT
Start: 2018-06-25 | End: 2019-04-08 | Stop reason: SDUPTHER

## 2018-07-09 ENCOUNTER — OFFICE VISIT (OUTPATIENT)
Dept: FAMILY MEDICINE CLINIC | Facility: CLINIC | Age: 83
End: 2018-07-09
Payer: MEDICARE

## 2018-07-09 ENCOUNTER — APPOINTMENT (OUTPATIENT)
Dept: LAB | Facility: CLINIC | Age: 83
End: 2018-07-09
Payer: MEDICARE

## 2018-07-09 VITALS
BODY MASS INDEX: 29.18 KG/M2 | DIASTOLIC BLOOD PRESSURE: 78 MMHG | SYSTOLIC BLOOD PRESSURE: 122 MMHG | WEIGHT: 170 LBS | HEART RATE: 68 BPM

## 2018-07-09 DIAGNOSIS — E78.2 MIXED HYPERLIPIDEMIA: ICD-10-CM

## 2018-07-09 DIAGNOSIS — E11.9 TYPE 2 DIABETES MELLITUS WITHOUT COMPLICATION, WITHOUT LONG-TERM CURRENT USE OF INSULIN (HCC): ICD-10-CM

## 2018-07-09 DIAGNOSIS — E87.6 HYPOKALEMIA: ICD-10-CM

## 2018-07-09 DIAGNOSIS — I48.91 ATRIAL FIBRILLATION, UNSPECIFIED TYPE (HCC): Primary | ICD-10-CM

## 2018-07-09 DIAGNOSIS — I10 ESSENTIAL HYPERTENSION: ICD-10-CM

## 2018-07-09 PROCEDURE — G0439 PPPS, SUBSEQ VISIT: HCPCS | Performed by: PHYSICIAN ASSISTANT

## 2018-07-09 PROCEDURE — 99214 OFFICE O/P EST MOD 30 MIN: CPT | Performed by: PHYSICIAN ASSISTANT

## 2018-07-09 RX ORDER — POTASSIUM CHLORIDE 750 MG/1
10 TABLET, EXTENDED RELEASE ORAL 2 TIMES DAILY
Qty: 30 TABLET | Refills: 1 | Status: SHIPPED | OUTPATIENT
Start: 2018-07-09 | End: 2019-02-22 | Stop reason: ALTCHOICE

## 2018-07-09 NOTE — PROGRESS NOTES
Assessment and Plan:    Problem List Items Addressed This Visit     Atrial fibrillation ([address] ) - Primary    Hyperlipidemia    Hypertension    Type 2 diabetes mellitus ([address] )      Other Visit Diagnoses     Hypokalemia        Relevant Medications    potassium chloride (K-DUR,KLOR-CON) 10 mEq tablet    Other Relevant Orders    Basic metabolic panel        Health Maintenance Due   Topic Date Due    Depression Screening PHQ-9  1934    DTaP,Tdap,and Td Vaccines (1 - Tdap) 04/08/1955    Fall Risk  04/08/1999    Urinary Incontinence Screening  04/08/1999    GLAUCOMA SCREENING 72 + YR  04/08/2001    DM Eye Exam  10/07/2016    Diabetic Foot Exam  09/20/2017    URINE MICROALBUMIN  01/17/2018         HPI:  Elfego Rowe is a 80 y o  female here for her IPPE(Welcome to Medicare Visit )    Patient Active Problem List   Diagnosis    Arthritis    Atrial fibrillation ([address] )    Chronic congestive heart failure (HCC)    Chronic lumbar radiculopathy    Coagulation defect ([address] )    Hemorrhoids    History of mitral valve replacement    Hyperlipidemia    Hypertension    Myofascial pain syndrome    Osteopenia    Pain syndrome, chronic    Sacroiliitis (HCC)    Type 2 diabetes mellitus ([address] )    Vitamin D deficiency     Past Medical History:   Diagnosis Date    Arthritis     Benign polyp of large intestine     Chronic pain disorder     bilateral back - last assessed 12/27/17    Diabetes mellitus ([address] )     Endocarditis     Hypertension     Hypertension     Spinal stenosis     Stroke syndrome ([address] ) 1985     Past Surgical History:   Procedure Laterality Date    BACK SURGERY      CARPAL TUNNEL RELEASE Left     CATARACT EXTRACTION, BILATERAL  2011    CHOLECYSTECTOMY      FOOT SURGERY Right     GALLBLADDER SURGERY      LUMBAR LAMINECTOMY Right 11/08/2012    L3-4 min   invasive far lateral microdiscectomy for decompression of nerve root, transforaminal epidural steroid injection; operating room microscope for microdissection    MITRAL VALVE REPLACEMENT      biolical valve failed [address] [facility]      UMBILICAL HERNIA REPAIR N/A 3/17/2017    Procedure: REPAIR HERNIA UMBILICAL;  Surgeon: Toribio Curling, DO;  [facility];  Service:     VALVE REPLACEMENT       Family History   Problem Relation Age of Onset    Clotting disorder Mother         disorder of blood and blood forming organs    Heart attack Father     Cancer Maternal Grandfather     Heart disease Paternal Grandfather     Diabetes Family      History   Smoking Status    Never Smoker   Smokeless Tobacco    Never Used     History   Alcohol Use No      History   Drug Use No       Current Outpatient Prescriptions   Medication Sig Dispense Refill    atenolol (TENORMIN) 50 mg tablet Take 1 tablet (50 mg total) by mouth daily 90 tablet 3    enoxaparin (LOVENOX) 80 mg/0 8 mL Inject under the skin      gabapentin (NEURONTIN) 300 mg capsule Take by mouth      losartan-hydrochlorothiazide (HYZAAR) 100-12 5 MG per tablet TAKE 1 TABLET EVERY DAY 90 tablet 3    metFORMIN (GLUCOPHAGE-XR) 500 mg 24 hr tablet Take 1 tablet (500 mg total) by mouth daily 90 tablet 3    potassium chloride (K-DUR,KLOR-CON) 10 mEq tablet Take 1 tablet (10 mEq total) by mouth 2 (two) times a day 30 tablet 1    pravastatin (PRAVACHOL) 40 mg tablet TAKE 1 TABLET EVERY DAY 90 tablet 3    traMADol (ULTRAM) 50 mg tablet Take 1 tablet (50 mg total) by mouth daily as needed for moderate pain 30 tablet 1    warfarin (COUMADIN) 5 mg tablet Take 1 tablet (5 mg total) by mouth daily 90 tablet 3     No current facility-administered medications for this visit        Allergies   Allergen Reactions    Lisinopril      cough     Immunization History   Administered Date(s) Administered    Influenza Split High Dose Preservative Free IM 10/07/2013, 09/21/2014, 09/24/2015, 09/20/2016, 10/25/2017    Influenza TIV (IM) 11/15/2010, 10/02/2012    Pneumococcal Conjugate 13-Valent 06/19/2017    Pneumococcal Polysaccharide PPV23 02/07/2014       Patient Care Team:  Anahy Membreno PA-C as PCP - General  PHILIP Delaney PA-C Daryl Tenna Dura, MD (Inactive)  MD Javi Staton, Az Lobo MD      Medicare Screening Tests and Risk Assessments:  AWV Clinical     ISAR:       Once in a Lifetime Medicare Screening:       Medicare Screening Tests and Risk Assessment:   AAA Risk Assessment    Osteoporosis Risk Assessment     Female:  Yes   :  Yes :  No   Age over 48:  Yes    Tobacco use:  Yes    HIV Risk Assessment    None indicated:  Yes        Drug and Alcohol Use:   Tobacco use    Cigarettes:  never smoker    Tobacco use duration    Tobacco Cessation Readiness    Alcohol use    Alcohol use:  never    Alcohol Treatment Readiness   Illicit Drug Use    Drug use:  never        Diet & Exercise:   Diet   What is your diet?:  Regular   How many servings a day of the following:   Fruits and Vegetables:  1-2 Meat:  1-2   Whole Grains:  1    Dairy:  1     Tea:  1   Exercise    Do you currently exercise?:  yes    Frequency:  daily    Minutes per day:  30   stretching        Cognitive Impairment Screening:   Depression screening preformed:  No    Cognitive Impairment Screening    Do you have difficulty learning or retaining new information?:  No Do you have difficulty handling new tasks?:  No   Do you have difficulty with reasoning?:  No Do you have difficulty with spatial ability and orientation?:  No   Do you have difficulty with language?:  No Do you have difficulty with behavior?:  No       Functional Ability/Level of Safety:   Hearing    Hearing difficulties:  No Bilateral:  normal   Left:  normal    Hearing aid:  No    Hearing Impairment Assessment    Hearing status:  No impairment   Current Activities    Status:  unlimited ADL's, unlimited IADL's, unlimited driving, unlimited social activities   Help needed with the folllowing:    Using the phone:  No Transportation:  No   Shopping:  No Preparing Meals:  No   Doing Housework:  No Doing Laundry:  No    Managing Money:  No   ADL    Fall Risk   Have you fallen in the last 12 months?:  No    Injury History       Home Safety:   Are there hazards in your environment?:  No   If you fell, would you need help to get back up from the ground?:  No Do you have problems or concerns getting in/out of a bed, chair, tub, or toilet?:  No   Do you feel unsteady when walking?:  No Is your activity limited by pain?:  No   Do you have handrails and grab-bars in the home?:  No Are emergency numbers kept by the phone and regularly updated?:  No   Are you and/or family members aware of the dangers of smoking in bed?:  No Are firearms stored securely?:  No   Do you have working smoke alarms and fire extinguisher?:  No    Have you left the stove on unsupervised?:  No    Home Safety Risk Factors   Unfamilar with surroundings:  No Uneven floors:  No   Stairs or handrail saftey risk:  No Loose rugs:  No   Household clutter:  No Poor household lighting:  No   No grab bars in bathroom:  No Further evaluation needed:  No       Advanced Directives:   Advanced Directives    Living Will:  No Durable POA for healthcare:   Yes   Advanced directive:  Yes    Patient's End of Life Decisions    Reviewed with patient:  Yes Provider agrees with end of life decisions:  Yes       Urinary Incontinence:   Do you have urinary incontinence?:  No Do you have incomplete emptying?:  No   Do you urinate frequently?:  No Do you have urinary urgency?:  No   Do you have urinary hesitancy?:  No Do you have dysuria (painful and/or difficult urination)?:  No   Do you have nocturia (waking up to urinate)?:  No Do you strain when urinating (have to push to urinate)?:  No   Do you have a weak stream when urinating?:  No Do you have intermittent streaming when urinating?:  No   Do you dribble urine after finishing?:  No    Do you have vaginal pressure?:  No Do you have vaginal dryness?:  No       Glaucoma:            Provider Screening     Preventative Screening/Counseling:   Cardiovascular Screening/Counseling:   (Labs Q5 years, EKG optional one-time)   General:  Screening Current           Diabetes Screening/Counseling:   (2 tests/year if Pre-Diabetes or 1 test/year if no Diabetes)   General:  Screening Current           Colorectal Cancer Screening/Counseling:   (FOBT Q1 yr; Flex Sig Q4 yrs or Q10 yrs after Screening Colonoscopy; Screening Colonoscpy Q2 yrs High Risk or Q10 yrs Low Risk; Barium Enema Q2 yrs High Risk or Q4 yrs Low Risk)   General:  Screening Current           Prostate Cancer Screening/Counseling:   (Annual)          Breast Cancer Screening/Counseling:   (Baseline Age 28 - 43; Annual Age 36+)   General:  Screening Current          Cervical Cancer Screening/Counseling:   (Annual for High Risk or Childbearing Age with Abnormal Pap in Last 3 yrs; Every 2 all others)   General:  Screening Not Indicated           Osteoporosis Screening/Counseling:   (Every 2 Yrs if at risk or more if medically necessary)   General:  Screening Current           AAA Screening/Counseling:   (Once per Lifetime with risk factors)    General:  Screening Not Indicated           Glaucoma Screening/Counseling:   (Annual)   General:  Screening Current          HIV Screening/Counseling:   (Voluntary; Once annually for high risk OR 3 times for Pregnancy at diagnosis of IUP; 3rd trimester; and at Labor   General:  Screening Not Indicated           Hepatitis C Screening:             Immunizations:        Other Preventative Couseling (Non-Medicare Wellness Visit Required):       Referrals (Non-Medicare Wellness Visit Required):       Medical Equipment/Suppliers:

## 2018-07-09 NOTE — PROGRESS NOTES
Assessment/Plan:  Patient Instructions   1  Atrial fibrillation-stable per Cardiology with Coumadin therapy  Rate is controlled with beta-blocker  2   Type 2 diabetes-stable on metformin with hemoglobin A1c of 5 8  3   Neuropathy-stable on gabapentin 300 mg   4   Hypertension-stable with Hyzaar  5   Hyperlipidemia-stable on pravastatin  6   Skin tear of the right anterior tibia-this is clean without sign of cellulitis or infection  Continue regular dressing changes with hydrogen peroxide  7   Hypokalemia-patient started on potassium chloride 10 mEq and repeat potassium to be drawn in 1month  8   Health maintenance-annual Medicare wellness visit note completed  Diagnoses and all orders for this visit:    Atrial fibrillation, unspecified type [facility])    Essential hypertension    Type 2 diabetes mellitus without complication, without long-term current use of insulin ([facility])    Mixed hyperlipidemia    Hypokalemia  -     Basic metabolic panel; Future  -     potassium chloride (K-DUR,KLOR-CON) 10 mEq tablet; Take 1 tablet (10 mEq total) by mouth 2 (two) times a day          Subjective:      Patient ID: Mitchell Womack is a 80 y o  female  HPI:  This is an 80-year-old female who presents to the office for follow-up of chronic health conditions including hypertension, hyperlipidemia, atrial fibrillation, and type 2 diabetes  She has been feeling well complain of a skin tear on her right anterior tibia which she would like looked at  She has been using sterile dressing and hydrogen peroxide  She has not noticed any purulence or discharge from it  She has been keeping it covered most of the time  She does have a small amount of clear serous drainage at times  Patient is also present for annual Medicare wellness visit today  See separate note  With regard to her atrial fibrillation she does continue with Coumadin therapy and regular monitoring  She had a PT/INR drawn this morning    She has not had any cardiac complaints and denies any palpitations, chest pains, or shortness of breath  She also has a history of hypokalemia and had labs 1 month ago which showed her potassium stable at 3 3  She does take hydrochlorothiazide mixed with her blood pressure medication  She has been making an effort through dietary changes to increase her potassium intake  The following portions of the patient's history were reviewed and updated as appropriate: allergies, current medications, past family history, past medical history, past social history, past surgical history and problem list     Review of Systems   Constitutional: Negative for chills, fatigue and fever  HENT: Negative for congestion, ear pain and sinus pressure  Eyes: Negative for visual disturbance  Respiratory: Negative for cough, chest tightness and shortness of breath  Cardiovascular: Negative for chest pain and palpitations  Gastrointestinal: Negative for diarrhea, nausea and vomiting  Endocrine: Negative for polyuria  Genitourinary: Negative for dysuria and frequency  Musculoskeletal: Negative for arthralgias and myalgias  Skin: Positive for wound  Negative for pallor and rash  Skin tear of the right anterior tibia  Neurological: Negative for dizziness, weakness, light-headedness, numbness and headaches  Psychiatric/Behavioral: Negative for agitation, behavioral problems and sleep disturbance  All other systems reviewed and are negative  Objective:      /78   Pulse 68   Wt 77 1 kg (170 lb)   BMI 29 18 kg/m²          Physical Exam   Constitutional: She is oriented to person, place, and time  She appears well-developed and well-nourished  No distress  HENT:   Head: Normocephalic and atraumatic  Right Ear: External ear normal    Left Ear: External ear normal    Nose: Nose normal    Mouth/Throat: Oropharynx is clear and moist  No oropharyngeal exudate     Eyes: Conjunctivae and EOM are normal  Pupils are equal, round, and reactive to light  Neck: Normal range of motion  Neck supple  No tracheal deviation present  No thyromegaly present  Cardiovascular: Normal rate, regular rhythm and normal heart sounds  Exam reveals no friction rub  No murmur heard  Pulmonary/Chest: Effort normal and breath sounds normal  No respiratory distress  She has no wheezes  She has no rales  Abdominal: Soft  Bowel sounds are normal  She exhibits no distension  There is no tenderness  There is no rebound and no guarding  Musculoskeletal: Normal range of motion  She exhibits no edema or tenderness  Lymphadenopathy:     She has no cervical adenopathy  Neurological: She is alert and oriented to person, place, and time  No cranial nerve deficit  Coordination normal    Skin: Skin is warm and dry  No rash noted  No erythema  Right anterior tibial skin tear which measures approximately 0 5 cm x 1 5 cm  There is no surrounding erythema or purulence present  Nontender to palpation  Psychiatric: She has a normal mood and affect  Her behavior is normal  Thought content normal    Nursing note and vitals reviewed

## 2018-07-09 NOTE — PATIENT INSTRUCTIONS
1   Atrial fibrillation-stable per Cardiology with Coumadin therapy  Rate is controlled with beta-blocker  2   Type 2 diabetes-stable on metformin with hemoglobin A1c of 5 8  3   Neuropathy-stable on gabapentin 300 mg   4   Hypertension-stable with Hyzaar  5   Hyperlipidemia-stable on pravastatin  6   Skin tear of the right anterior tibia-this is clean without sign of cellulitis or infection  Continue regular dressing changes with hydrogen peroxide  7   Hypokalemia-patient started on potassium chloride 10 mEq and repeat potassium to be drawn in 1month  8   Health maintenance-annual Medicare wellness visit note completed

## 2018-07-10 ENCOUNTER — ANTICOAG VISIT (OUTPATIENT)
Dept: CARDIOLOGY CLINIC | Facility: CLINIC | Age: 83
End: 2018-07-10

## 2018-07-23 ENCOUNTER — ANTICOAG VISIT (OUTPATIENT)
Dept: CARDIOLOGY CLINIC | Facility: CLINIC | Age: 83
End: 2018-07-23

## 2018-07-23 ENCOUNTER — APPOINTMENT (OUTPATIENT)
Dept: LAB | Facility: CLINIC | Age: 83
End: 2018-07-23
Payer: MEDICARE

## 2018-07-25 ENCOUNTER — OFFICE VISIT (OUTPATIENT)
Dept: CARDIOLOGY CLINIC | Facility: CLINIC | Age: 83
End: 2018-07-25
Payer: MEDICARE

## 2018-07-25 VITALS
BODY MASS INDEX: 28.51 KG/M2 | SYSTOLIC BLOOD PRESSURE: 110 MMHG | WEIGHT: 167 LBS | HEART RATE: 56 BPM | DIASTOLIC BLOOD PRESSURE: 64 MMHG | RESPIRATION RATE: 16 BRPM | HEIGHT: 64 IN

## 2018-07-25 DIAGNOSIS — I48.21 PERMANENT ATRIAL FIBRILLATION (HCC): Primary | ICD-10-CM

## 2018-07-25 DIAGNOSIS — I10 BENIGN ESSENTIAL HTN: ICD-10-CM

## 2018-07-25 DIAGNOSIS — E78.5 DYSLIPIDEMIA: ICD-10-CM

## 2018-07-25 PROCEDURE — 99214 OFFICE O/P EST MOD 30 MIN: CPT | Performed by: INTERNAL MEDICINE

## 2018-07-25 NOTE — PROGRESS NOTES
Cardiology Follow Up    Adrienne Calix  1934  [id number]  [address] [place] [id number]      Interval History: This is a very pleasant 77-year-old female with a significant history of infectious endocarditis in 1986, possibly in the setting of mitral valve prolapse, who initially underwent mitral valve replacement with a bioprosthesis  This failed after about 10 years later, and in 1997 she received a [facility]  [employer] mechanical mitral valve prosthesis  She also has a history of paroxysmal atrial fibrillation, lasting by Holter monitoring in 2009  She has been followed by Dr Abdulaziz Lee in our [facility] office all these years, and then switched over to our office  Her last echocardiogram in June 2016 revealed a stable mechanical mitral valve prosthesis with a normal ejection fraction at 55-60% with mild to moderate biatrial dilatation  There was a small echodensity prolapsing back from the AV, possibly residual surgical material, which has been chronic  From a symptomatic standpoint she feels very well with no exertional chest pain, shortness of breath, fevers or chills  She gets occasional lower extremity edema when she sits for prolonged period of time  She tolerates warfarin fairly well  She has mild LE edema that is somewhat chronic, worse when she keeps her feet dangling for a prolonged amount of time  She states compression stockings have helped  Since her last visit she had cut her lower right leg shin on a cabinet which used for several days but then stopped  Her INR in the beginning of July was a bit low, which thereafter improved with adjustment of her warfarin dosing      Patient Active Problem List   Diagnosis    Arthritis    Atrial fibrillation ([address] )    Chronic congestive heart failure (HCC)    Chronic lumbar radiculopathy    Coagulation defect ([address] )    Hemorrhoids    History of mitral valve replacement  Hyperlipidemia    Hypertension    Myofascial pain syndrome    Osteopenia    Pain syndrome, chronic    Sacroiliitis (HCC)    Type [facility] diabetes mellitus [facility]    Vitamin D deficiency     Past Medical History:   Diagnosis Date    Arthritis     Benign polyp of large intestine     Chronic pain disorder     bilateral back - last assessed 12/27/17    Diabetes mellitus ([facility]     Endocarditis     Hypertension     Hypertension     Spinal stenosis     Stroke syndrome [facility]) 1985     Social History     Social History    Marital status:      Spouse name: N/A    Number of children: 2    Years of education: N/A     Occupational History    Retired      Social History Main Topics    Smoking status: Never Smoker    Smokeless tobacco: Never Used    Alcohol use No    Drug use: No    Sexual activity: Not on file     Other Topics Concern    Not on file     Social History Narrative    No narrative on file      Family History   Problem Relation Age of Onset    Clotting disorder Mother         disorder of blood and blood forming organs    Heart attack Father     Cancer Maternal Grandfather     Heart disease Paternal Grandfather     Diabetes Family      Past Surgical History:   Procedure Laterality Date    BACK SURGERY      CARPAL TUNNEL RELEASE Left     CATARACT EXTRACTION, BILATERAL  2011    CHOLECYSTECTOMY      FOOT SURGERY Right     GALLBLADDER SURGERY      LUMBAR LAMINECTOMY Right 11/08/2012    L3-4 min   invasive far lateral microdiscectomy for decompression of nerve root, transforaminal epidural steroid injection; operating room microscope for microdissection    MITRAL VALVE REPLACEMENT      biolical valve failed [address]  [facility] [address] N/A 3/17/2017    Procedure: [facility];  Surgeon: Kassandra Mcrae DO;  [facility];  Service:     VALVE REPLACEMENT         Current Outpatient Prescriptions:     atenolol (TENORMIN) 50 mg tablet, Take 1 tablet (50 mg total) by mouth daily, Disp: 90 tablet, Rfl: 3    gabapentin (NEURONTIN) 300 mg capsule, Take by mouth 6 tablets daily , Disp: , Rfl:     losartan-hydrochlorothiazide (HYZAAR) 100-12 5 MG per tablet, TAKE 1 TABLET EVERY DAY, Disp: 90 tablet, Rfl: 3    metFORMIN (GLUCOPHAGE-XR) 500 mg 24 hr tablet, Take 1 tablet (500 mg total) by mouth daily, Disp: 90 tablet, Rfl: 3    potassium chloride (K-DUR,KLOR-CON) 10 mEq tablet, Take 1 tablet (10 mEq total) by mouth 2 (two) times a day, Disp: 30 tablet, Rfl: 1    pravastatin (PRAVACHOL) 40 mg tablet, TAKE 1 TABLET EVERY DAY, Disp: 90 tablet, Rfl: 3    traMADol (ULTRAM) 50 mg tablet, Take 1 tablet (50 mg total) by mouth daily as needed for moderate pain, Disp: 30 tablet, Rfl: 1    warfarin (COUMADIN) 5 mg tablet, Take 1 tablet (5 mg total) by mouth daily, Disp: 90 tablet, Rfl: 3  Allergies   Allergen Reactions    Lisinopril      cough       Labs:   Ancillary Orders on 07/20/2018   Component Date Value    Protime 07/23/2018 30 4*    INR 07/23/2018 2 90*   Ancillary Orders on 07/06/2018   Component Date Value    Protime 07/09/2018 21 1*    INR 07/09/2018 1 81*   Ancillary Orders on 06/22/2018   Component Date Value    Protime 06/25/2018 27 3*    INR 06/25/2018 2 53*   Ancillary Orders on 06/08/2018   Component Date Value    Protime 06/18/2018 38 3*    INR 06/18/2018 3 91*   Appointment on 06/06/2018   Component Date Value    Sodium 06/06/2018 141     Potassium 06/06/2018 3 3*    Chloride 06/06/2018 103     CO2 06/06/2018 29     Anion Gap 06/06/2018 9     BUN 06/06/2018 19     Creatinine 06/06/2018 0 70     Glucose, Fasting 06/06/2018 114*    Calcium 06/06/2018 9 2     AST 06/06/2018 17     ALT 06/06/2018 24     Alkaline Phosphatase 06/06/2018 49     Total Protein 06/06/2018 6 9     Albumin 06/06/2018 3 6     Total Bilirubin 06/06/2018 0 73     eGFR 06/06/2018 80     Hemoglobin A1C 06/06/2018 5 8     EAG 06/06/2018 120     Cholesterol 06/06/2018 118     Triglycerides 06/06/2018 87     HDL, Direct 06/06/2018 45     LDL Calculated 06/06/2018 56     WBC 06/06/2018 6 46     RBC 06/06/2018 4 46     Hemoglobin 06/06/2018 13 5     Hematocrit 06/06/2018 41 7     MCV 06/06/2018 94     MCH 06/06/2018 30 3     MCHC 06/06/2018 32 4     RDW 06/06/2018 13 2     MPV 06/06/2018 10 9     Platelets 70/24/8357 270     nRBC 06/06/2018 0     Neutrophils Relative 06/06/2018 70     Immat GRANS % 06/06/2018 0     Lymphocytes Relative 06/06/2018 21     Monocytes Relative 06/06/2018 7     Eosinophils Relative 06/06/2018 1     Basophils Relative 06/06/2018 1     Neutrophils Absolute 06/06/2018 4 52     Immature Grans Absolute 06/06/2018 0 02     Lymphocytes Absolute 06/06/2018 1 33     Monocytes Absolute 06/06/2018 0 48     Eosinophils Absolute 06/06/2018 0 08     Basophils Absolute 06/06/2018 0 03     TSH 3RD GENERATON 06/06/2018 0 557    Ancillary Orders on 06/01/2018   Component Date Value    Protime 06/06/2018 35 5*    INR 06/06/2018 3 55*     Imaging: No results found  Review of Systems:  Review of Systems   Respiratory: Negative for apnea, cough, choking, chest tightness, shortness of breath, wheezing and stridor  Cardiovascular: Positive for leg swelling  Negative for chest pain and palpitations  All other systems reviewed and are negative  Physical Exam:  Physical Exam   Constitutional: She is oriented to person, place, and time  She appears well-developed and well-nourished  HENT:   Head: Normocephalic and atraumatic  Eyes: Conjunctivae and EOM are normal  Pupils are equal, round, and reactive to light  Neck: Normal range of motion  Neck supple  Cardiovascular: Normal rate, regular rhythm and intact distal pulses  S1 click without murmur   Pulmonary/Chest: Effort normal and breath sounds normal    Abdominal: Soft   Bowel sounds are normal    Musculoskeletal: Normal range of motion  Trace LE edema   Neurological: She is alert and oriented to person, place, and time  She has normal reflexes  Skin: Skin is warm and dry  Psychiatric: She has a normal mood and affect  Her behavior is normal  Judgment and thought content normal        Discussion/Summary:  1  History of mechanical mitral valve replacement: Patient is tolerating warfarin well, reviewed recent INR results, mostly therapeutic  No abnormal bleeding or heavy bruising  No murmur on exam   Mobile density seen on prior echo echo adjacent to mitral valve annulus just under AV, likely residual surgical material   Same present in 2016  2  Hypertension: Well controlled, continue losartan, atenolol    3  LE edema: trace, stable, dependent  Continue compression stockings  Patient will call if any worsening  No s/s of CHF  Patient not on loop diuretic  4  PAF:  Rate controled Afib today, no symptoms  Continue atenolol, warfarin    5    Dyslipidemia:  Reviewed panel from 06/2018--controlled, con't pravastatin    F/U in 6 months

## 2018-08-01 ENCOUNTER — OFFICE VISIT (OUTPATIENT)
Dept: PAIN MEDICINE | Facility: MEDICAL CENTER | Age: 83
End: 2018-08-01
Payer: MEDICARE

## 2018-08-01 VITALS
HEART RATE: 68 BPM | WEIGHT: 167 LBS | DIASTOLIC BLOOD PRESSURE: 52 MMHG | RESPIRATION RATE: 14 BRPM | SYSTOLIC BLOOD PRESSURE: 104 MMHG | BODY MASS INDEX: 28.51 KG/M2 | HEIGHT: 64 IN

## 2018-08-01 DIAGNOSIS — F11.20 UNCOMPLICATED OPIOID DEPENDENCE (HCC): ICD-10-CM

## 2018-08-01 DIAGNOSIS — M54.41 CHRONIC BILATERAL LOW BACK PAIN WITH BILATERAL SCIATICA: ICD-10-CM

## 2018-08-01 DIAGNOSIS — G89.4 PAIN SYNDROME, CHRONIC: ICD-10-CM

## 2018-08-01 DIAGNOSIS — G89.29 CHRONIC BILATERAL LOW BACK PAIN WITH BILATERAL SCIATICA: ICD-10-CM

## 2018-08-01 DIAGNOSIS — M54.16 CHRONIC LUMBAR RADICULOPATHY: ICD-10-CM

## 2018-08-01 DIAGNOSIS — M79.18 MYOFASCIAL PAIN SYNDROME: ICD-10-CM

## 2018-08-01 DIAGNOSIS — M54.42 CHRONIC BILATERAL LOW BACK PAIN WITH BILATERAL SCIATICA: ICD-10-CM

## 2018-08-01 DIAGNOSIS — G89.4 CHRONIC PAIN SYNDROME: Primary | ICD-10-CM

## 2018-08-01 DIAGNOSIS — M46.1 SACROILIITIS (HCC): ICD-10-CM

## 2018-08-01 DIAGNOSIS — Z79.891 LONG-TERM CURRENT USE OF OPIATE ANALGESIC: ICD-10-CM

## 2018-08-01 PROCEDURE — 99214 OFFICE O/P EST MOD 30 MIN: CPT | Performed by: NURSE PRACTITIONER

## 2018-08-01 RX ORDER — GABAPENTIN 300 MG/1
600 CAPSULE ORAL 3 TIMES DAILY
Qty: 180 CAPSULE | Refills: 2 | Status: SHIPPED | OUTPATIENT
Start: 2018-08-01 | End: 2019-01-28 | Stop reason: SDUPTHER

## 2018-08-01 RX ORDER — TRAMADOL HYDROCHLORIDE 50 MG/1
50 TABLET ORAL DAILY PRN
Qty: 30 TABLET | Refills: 1 | Status: SHIPPED | OUTPATIENT
Start: 2018-08-01 | End: 2019-04-15 | Stop reason: SDUPTHER

## 2018-08-01 NOTE — PROGRESS NOTES
Assessment:  1  Chronic pain syndrome    2  Long-term current use of opiate analgesic    3  Uncomplicated opioid dependence ([address] )    4  Chronic lumbar radiculopathy    5  Myofascial pain syndrome    6  Sacroiliitis (HCC)    7  Pain syndrome, chronic    8  Chronic bilateral low back pain with bilateral sciatica        Plan:  Continue with tramadol and gabapentin as prescribed both of these were refilled today  While the patient was in the office today an opioid contract was thoroughly reviewed and signed by the patient  The patient was given adequate time to ask questions in regards to the contract and a signed copy was sent home for his/her records  There are risks associated with opioid medications, including dependence, addiction and tolerance  The patient understands and agrees to use these medications only as prescribed  Potential side effects of the medications include, but are not limited to, constipation, drowsiness, addiction, impaired judgment and risk of fatal overdose if not taken as prescribed  The patient was warned against driving while taking sedation medications  Sharing medications is a felony  At this point in time, the patient is showing no signs of addiction, abuse, diversion or suicidal ideation  A urine drug screen was collected at today's office visit as part of our medication management protocol  The point of care testing results were appropriate for what was being prescribed  The specimen will be sent for confirmatory testing  The drug screen is medically necessary because the patient is either dependent on opioid medication or is being considered for opioid medication therapy and the results could impact ongoing or future treatment  The drug screen is to evaluate for the presences or absence of prescribed, non-prescribed, and/or illicit drugs/substances      [facility]  Prescription Drug Monitoring Program report was reviewed and was appropriate         My impressions and treatment recommendations were discussed in detail with the patient who verbalized understanding and had no further questions  Discharge instructions were provided  I personally saw and examined the patient and I agree with the above discussed plan of care  Orders Placed This Encounter   Procedures    MM ALL_Prescribed Meds and Special Instructions     New Medications Ordered This Visit   Medications    gabapentin (NEURONTIN) 300 mg capsule     Sig: Take 2 capsules (600 mg total) by mouth 3 (three) times a day 6 tablets daily     Dispense:  180 capsule     Refill:  2    traMADol (ULTRAM) 50 mg tablet     Sig: Take 1 tablet (50 mg total) by mouth daily as needed for moderate pain     Dispense:  30 tablet     Refill:  1       History of Present Illness:    Genet Bhandari is a 80 y o  female who presents for a follow-up visit related to her low back pain  Today the patient rates her pain 5/10, this is constant in nature most bothersome in the morning  She describes her pain as dull, aching, and pressure-like  Patient continues to take tramadol 50 mg 1 tablet as needed she generally only uses this if she know she is going to be going away for the day  She also takes gabapentin 300 mg 2 tablets 3 times a day  Her medication regimen provides her 50% relief without any side effects or issues  I have personally reviewed and/or updated the patient's past medical history, past surgical history, family history, social history, current medications, allergies, and vital signs today  Review of Systems:    Review of Systems   Respiratory: Negative for shortness of breath  Cardiovascular: Negative for chest pain  Gastrointestinal: Positive for constipation  Negative for diarrhea, nausea and vomiting  Musculoskeletal: Positive for back pain, gait problem and joint swelling  Negative for arthralgias and myalgias  Skin: Negative for rash  Neurological: Negative for dizziness, seizures and weakness  All other systems reviewed and are negative  Patient Active Problem List   Diagnosis    Arthritis    Atrial fibrillation (HCC)    Chronic congestive heart failure (HCC)    Chronic lumbar radiculopathy    Coagulation defect (HCC)    Hemorrhoids    History of mitral valve replacement    Hyperlipidemia    Hypertension    Myofascial pain syndrome    Osteopenia    Chronic pain syndrome    [facility]    Type 2 diabetes mellitus ([facility] )    Vitamin D deficiency    Long-term current use of opiate analgesic       Past Medical History:   Diagnosis Date    Arthritis     Benign polyp of large intestine     Chronic pain disorder     bilateral back - last assessed 12/27/17    Diabetes mellitus ([facility] )     Endocarditis     Hypertension     Hypertension     Spinal stenosis     Stroke syndrome ([facility] ) 1985       Past Surgical History:   Procedure Laterality Date    BACK SURGERY      CARPAL TUNNEL RELEASE Left     CATARACT EXTRACTION, BILATERAL  2011    CHOLECYSTECTOMY      FOOT SURGERY Right     GALLBLADDER SURGERY      LUMBAR LAMINECTOMY Right 11/08/2012    L3-4 min   invasive far lateral microdiscectomy for decompression of nerve root, transforaminal epidural steroid injection; operating room microscope for microdissection    MITRAL VALVE REPLACEMENT      biolical valve failed [address]  [facility] [address]  [facility] [address] N/A 3/17/2017    Procedure: REPAIR HERNIA UMBILICAL;  Surgeon: Jennifer David DO;  [facility];  Service:     VALVE REPLACEMENT         Family History   Problem Relation Age of Onset    Clotting disorder Mother         disorder of blood and blood forming organs    Heart attack Father     Cancer Maternal Grandfather     Heart disease Paternal Grandfather     Diabetes Family        Social History     Occupational History    Retired      Social History Main Topics    Smoking status: Never Smoker    Smokeless tobacco: Never Used    Alcohol use No    Drug use: No    Sexual activity: Not on file       Current Outpatient Prescriptions on File Prior to Visit   Medication Sig    atenolol (TENORMIN) 50 mg tablet Take 1 tablet (50 mg total) by mouth daily    losartan-hydrochlorothiazide (HYZAAR) 100-12 5 MG per tablet TAKE 1 TABLET EVERY DAY    metFORMIN (GLUCOPHAGE-XR) 500 mg 24 hr tablet Take 1 tablet (500 mg total) by mouth daily    potassium chloride (K-DUR,KLOR-CON) 10 mEq tablet Take 1 tablet (10 mEq total) by mouth 2 (two) times a day    pravastatin (PRAVACHOL) 40 mg tablet TAKE 1 TABLET EVERY DAY    warfarin (COUMADIN) 5 mg tablet Take 1 tablet (5 mg total) by mouth daily    [DISCONTINUED] gabapentin (NEURONTIN) 300 mg capsule Take by mouth 6 tablets daily     [DISCONTINUED] traMADol (ULTRAM) 50 mg tablet Take 1 tablet (50 mg total) by mouth daily as needed for moderate pain     No current facility-administered medications on file prior to visit  Allergies   Allergen Reactions    Lisinopril      cough           tramaodol last taken 8/01 AM   New UDS and opioid agreement today  Physical Exam:    /52   Pulse 68   Resp 14   Ht 5' 4" (1 626 m)   Wt 75 8 kg (167 lb)   BMI 28 67 kg/m²     Constitutional: normal, well developed, well nourished, alert, in no distress and non-toxic and no overt pain behavior    Eyes: anicteric  HEENT: grossly intact  Neck: supple, symmetric, trachea midline and no masses   Pulmonary:even and unlabored  Cardiovascular:No edema or pitting edema present  Skin:Normal without rashes or lesions and well hydrated  Psychiatric:Mood and affect appropriate  Neurologic:Cranial Nerves II-XII grossly intact  Musculoskeletal:ambulates with cane    Imaging

## 2018-08-08 ENCOUNTER — APPOINTMENT (OUTPATIENT)
Dept: LAB | Facility: CLINIC | Age: 83
End: 2018-08-08
Payer: MEDICARE

## 2018-08-08 DIAGNOSIS — E87.6 HYPOKALEMIA: ICD-10-CM

## 2018-08-08 LAB
ANION GAP SERPL CALCULATED.3IONS-SCNC: 5 MMOL/L (ref 4–13)
BUN SERPL-MCNC: 23 MG/DL (ref 5–25)
CALCIUM SERPL-MCNC: 9.3 MG/DL (ref 8.3–10.1)
CHLORIDE SERPL-SCNC: 103 MMOL/L (ref 100–108)
CO2 SERPL-SCNC: 32 MMOL/L (ref 21–32)
CREAT SERPL-MCNC: 0.74 MG/DL (ref 0.6–1.3)
GFR SERPL CREATININE-BSD FRML MDRD: 75 ML/MIN/1.73SQ M
GLUCOSE P FAST SERPL-MCNC: 108 MG/DL (ref 65–99)
POTASSIUM SERPL-SCNC: 3.8 MMOL/L (ref 3.5–5.3)
SODIUM SERPL-SCNC: 140 MMOL/L (ref 136–145)

## 2018-08-08 PROCEDURE — 80048 BASIC METABOLIC PNL TOTAL CA: CPT

## 2018-08-08 PROCEDURE — 36415 COLL VENOUS BLD VENIPUNCTURE: CPT

## 2018-08-13 ENCOUNTER — APPOINTMENT (OUTPATIENT)
Dept: LAB | Facility: CLINIC | Age: 83
End: 2018-08-13
Payer: MEDICARE

## 2018-08-13 ENCOUNTER — TRANSCRIBE ORDERS (OUTPATIENT)
Dept: LAB | Facility: CLINIC | Age: 83
End: 2018-08-13

## 2018-08-14 ENCOUNTER — ANTICOAG VISIT (OUTPATIENT)
Dept: CARDIOLOGY CLINIC | Facility: CLINIC | Age: 83
End: 2018-08-14

## 2018-08-14 DIAGNOSIS — Z79.891 LONG-TERM CURRENT USE OF OPIATE ANALGESIC: ICD-10-CM

## 2018-08-14 DIAGNOSIS — Z95.2 HISTORY OF MITRAL VALVE REPLACEMENT: ICD-10-CM

## 2018-08-14 DIAGNOSIS — I48.0 PAROXYSMAL ATRIAL FIBRILLATION (HCC): ICD-10-CM

## 2018-09-11 ENCOUNTER — ANTICOAG VISIT (OUTPATIENT)
Dept: CARDIOLOGY CLINIC | Facility: CLINIC | Age: 83
End: 2018-09-11

## 2018-09-11 ENCOUNTER — APPOINTMENT (OUTPATIENT)
Dept: LAB | Facility: CLINIC | Age: 83
End: 2018-09-11
Payer: MEDICARE

## 2018-09-11 DIAGNOSIS — Z79.891 LONG-TERM CURRENT USE OF OPIATE ANALGESIC: ICD-10-CM

## 2018-09-11 DIAGNOSIS — I48.0 PAROXYSMAL ATRIAL FIBRILLATION (HCC): ICD-10-CM

## 2018-09-11 DIAGNOSIS — Z95.2 HISTORY OF MITRAL VALVE REPLACEMENT: ICD-10-CM

## 2018-10-09 ENCOUNTER — APPOINTMENT (OUTPATIENT)
Dept: LAB | Facility: CLINIC | Age: 83
End: 2018-10-09
Payer: MEDICARE

## 2018-10-09 ENCOUNTER — ANTICOAG VISIT (OUTPATIENT)
Dept: CARDIOLOGY CLINIC | Facility: CLINIC | Age: 83
End: 2018-10-09

## 2018-10-09 DIAGNOSIS — Z95.2 HISTORY OF MITRAL VALVE REPLACEMENT: ICD-10-CM

## 2018-10-09 DIAGNOSIS — Z79.891 LONG-TERM CURRENT USE OF OPIATE ANALGESIC: ICD-10-CM

## 2018-10-09 DIAGNOSIS — I48.0 PAROXYSMAL ATRIAL FIBRILLATION (HCC): ICD-10-CM

## 2018-10-22 ENCOUNTER — ANTICOAG VISIT (OUTPATIENT)
Dept: CARDIOLOGY CLINIC | Facility: CLINIC | Age: 83
End: 2018-10-22

## 2018-10-22 ENCOUNTER — APPOINTMENT (OUTPATIENT)
Dept: LAB | Facility: CLINIC | Age: 83
End: 2018-10-22
Payer: MEDICARE

## 2018-10-22 DIAGNOSIS — Z79.01 LONG TERM CURRENT USE OF ANTICOAGULANT THERAPY: ICD-10-CM

## 2018-10-22 DIAGNOSIS — Z95.2 HEART VALVE REPLACED BY TRANSPLANT: ICD-10-CM

## 2018-10-22 DIAGNOSIS — Z79.891 LONG-TERM CURRENT USE OF OPIATE ANALGESIC: ICD-10-CM

## 2018-10-22 DIAGNOSIS — I48.0 PAROXYSMAL ATRIAL FIBRILLATION (HCC): ICD-10-CM

## 2018-10-22 DIAGNOSIS — I48.91 ATRIAL FIBRILLATION, UNSPECIFIED TYPE (HCC): ICD-10-CM

## 2018-10-22 DIAGNOSIS — Z95.2 HISTORY OF MITRAL VALVE REPLACEMENT: ICD-10-CM

## 2018-10-22 LAB
INR PPP: 3.1 (ref 0.86–1.17)
PROTHROMBIN TIME: 32 SECONDS (ref 11.8–14.2)

## 2018-10-22 PROCEDURE — 85610 PROTHROMBIN TIME: CPT

## 2018-10-22 PROCEDURE — 36415 COLL VENOUS BLD VENIPUNCTURE: CPT

## 2018-11-12 ENCOUNTER — APPOINTMENT (OUTPATIENT)
Dept: LAB | Facility: CLINIC | Age: 83
End: 2018-11-12
Payer: MEDICARE

## 2018-11-12 ENCOUNTER — OFFICE VISIT (OUTPATIENT)
Dept: FAMILY MEDICINE CLINIC | Facility: CLINIC | Age: 83
End: 2018-11-12
Payer: MEDICARE

## 2018-11-12 VITALS
HEIGHT: 64 IN | DIASTOLIC BLOOD PRESSURE: 70 MMHG | HEART RATE: 64 BPM | BODY MASS INDEX: 27.55 KG/M2 | WEIGHT: 161.4 LBS | SYSTOLIC BLOOD PRESSURE: 120 MMHG

## 2018-11-12 DIAGNOSIS — I50.9 CHRONIC CONGESTIVE HEART FAILURE, UNSPECIFIED HEART FAILURE TYPE (HCC): ICD-10-CM

## 2018-11-12 DIAGNOSIS — Z23 NEED FOR INFLUENZA VACCINATION: Primary | ICD-10-CM

## 2018-11-12 DIAGNOSIS — E55.9 VITAMIN D DEFICIENCY: ICD-10-CM

## 2018-11-12 DIAGNOSIS — Z12.39 SCREENING BREAST EXAMINATION: ICD-10-CM

## 2018-11-12 DIAGNOSIS — E11.9 TYPE 2 DIABETES MELLITUS WITHOUT COMPLICATION, WITHOUT LONG-TERM CURRENT USE OF INSULIN (HCC): ICD-10-CM

## 2018-11-12 DIAGNOSIS — E78.2 MIXED HYPERLIPIDEMIA: ICD-10-CM

## 2018-11-12 DIAGNOSIS — M46.1 SACROILIITIS (HCC): ICD-10-CM

## 2018-11-12 DIAGNOSIS — L03.116 CELLULITIS OF LEFT LOWER EXTREMITY: ICD-10-CM

## 2018-11-12 DIAGNOSIS — I48.0 PAROXYSMAL ATRIAL FIBRILLATION (HCC): ICD-10-CM

## 2018-11-12 PROCEDURE — 90662 IIV NO PRSV INCREASED AG IM: CPT | Performed by: PHYSICIAN ASSISTANT

## 2018-11-12 PROCEDURE — G0008 ADMIN INFLUENZA VIRUS VAC: HCPCS | Performed by: PHYSICIAN ASSISTANT

## 2018-11-12 PROCEDURE — 99214 OFFICE O/P EST MOD 30 MIN: CPT | Performed by: PHYSICIAN ASSISTANT

## 2018-11-12 RX ORDER — CEPHALEXIN 500 MG/1
500 CAPSULE ORAL EVERY 8 HOURS SCHEDULED
Qty: 21 CAPSULE | Refills: 0 | Status: SHIPPED | OUTPATIENT
Start: 2018-11-12 | End: 2018-11-19

## 2018-11-12 NOTE — PATIENT INSTRUCTIONS
1   Type 2 diabetes-stable with last hemoglobin A1c of 5 8 on metformin therapy  2   Hypertension-stable with atenolol, hydrochlorothiazide, and losartan  3   Congestive heart failure-stable with beta-blocker therapy  Continues follow-up with Cardiology  4   Atrial fibrillation-stable on warfarin therapy  5   Mild cellulitis of left lower extremity-recommend Keflex 500 mg 3 times daily for 7 days  6   Health maintenance-influenza vaccine given today

## 2018-11-12 NOTE — PROGRESS NOTES
Assessment/Plan:  Patient Instructions   1  Type 2 diabetes-stable with last hemoglobin A1c of 5 8 on metformin therapy  2   Hypertension-stable with atenolol, hydrochlorothiazide, and losartan  3   Congestive heart failure-stable with beta-blocker therapy  Continues follow-up with Cardiology  4   Atrial fibrillation-stable on warfarin therapy  5   Mild cellulitis of left lower extremity-recommend Keflex 500 mg 3 times daily for 7 days  6   Health maintenance-influenza vaccine given today  Diagnoses and all orders for this visit:    Need for influenza vaccination  -     influenza vaccine, [id number], high-dose, PF 0 5 mL, for patients 65 yr+ (FLUZONE HIGH-DOSE)    Type 2 diabetes mellitus without complication, without long-term current use of insulin (HCC)  -     CBC and differential; Future  -     Comprehensive metabolic panel; Future  -     Hemoglobin A1C; Future  -     Lipid Panel with Direct LDL reflex; Future  -     Vitamin D 25 hydroxy; Future  -     TSH, 3rd generation with Free T4 reflex; Future    Chronic congestive heart failure, unspecified heart failure type (HCC)  -     CBC and differential; Future  -     Comprehensive metabolic panel; Future  -     Hemoglobin A1C; Future  -     Lipid Panel with Direct LDL reflex; Future  -     Vitamin D 25 hydroxy; Future  -     TSH, 3rd generation with Free T4 reflex; Future    Paroxysmal atrial fibrillation (HCC)  -     CBC and differential; Future  -     Comprehensive metabolic panel; Future  -     Hemoglobin A1C; Future  -     Lipid Panel with Direct LDL reflex; Future  -     Vitamin D 25 hydroxy; Future  -     TSH, 3rd generation with Free T4 reflex; Future    Sacroiliitis (HCC)  -     CBC and differential; Future  -     Comprehensive metabolic panel; Future  -     Hemoglobin A1C; Future  -     Lipid Panel with Direct LDL reflex; Future  -     Vitamin D 25 hydroxy; Future  -     TSH, 3rd generation with Free T4 reflex;  Future    Vitamin D deficiency  - CBC and differential; Future  -     Comprehensive metabolic panel; Future  -     Hemoglobin A1C; Future  -     Lipid Panel with Direct LDL reflex; Future  -     Vitamin D 25 hydroxy; Future  -     TSH, 3rd generation with Free T4 reflex; Future    Mixed hyperlipidemia  -     CBC and differential; Future  -     Comprehensive metabolic panel; Future  -     Hemoglobin A1C; Future  -     Lipid Panel with Direct LDL reflex; Future  -     Vitamin D 25 hydroxy; Future  -     TSH, 3rd generation with Free T4 reflex; Future    Cellulitis of left lower extremity  -     cephalexin (KEFLEX) 500 mg capsule; Take 1 capsule (500 mg total) by mouth every 8 (eight) hours for 7 days    Screening breast examination  -     Mammo screening bilateral w 3d & cad; Future          Subjective: pt is here for follow up of hypertension~cd     Patient ID: Franklin Pearson is a 80 y o  female  HPI:  This is an 78-year-old female who presents to the office for follow-up of chronic health conditions including type 2 diabetes, hypertension, and hyperlipidemia  She also has a history of atrial fibrillation and congestive heart failure  She does continue to follow with Cardiology regularly and is maintained on warfarin therapy as well as atenolol for rate control  Her blood pressure has been stable with losartan and hydrochlorothiazide  Cholesterol has been stable on pravastatin 40 mg daily  She has no acute complaints today  She is interested in getting her flu vaccine  She also thinks that she is due for a mammogram   Patient also complains that she has a little bit of redness and tenderness and induration of her left lower extremity at the ankle  This just started in the past 2 days  She does have a history of an abrasion in that area from her cane which struck it incidentally  Her daughter who was a nurse had looked at it and was concerned for possible cellulitis    She has not had any fevers, chills, or systemic symptoms present  The following portions of the patient's history were reviewed and updated as appropriate: allergies, current medications, past family history, past medical history, past social history, past surgical history and problem list     Review of Systems   Constitutional: Negative for chills, fatigue and fever  HENT: Negative for congestion, ear pain and sinus pressure  Eyes: Negative for visual disturbance  Respiratory: Negative for cough, chest tightness and shortness of breath  Cardiovascular: Negative for chest pain and palpitations  Gastrointestinal: Negative for diarrhea, nausea and vomiting  Endocrine: Negative for polyuria  Genitourinary: Negative for dysuria and frequency  Musculoskeletal: Negative for arthralgias and myalgias  Skin: Negative for pallor and rash  Neurological: Negative for dizziness, weakness, light-headedness, numbness and headaches  Psychiatric/Behavioral: Negative for agitation, behavioral problems and sleep disturbance  All other systems reviewed and are negative  Objective:    Vitals:    11/12/18 1226   BP: 120/70   BP Location: Left arm   Patient Position: Sitting   Cuff Size: Standard   Pulse: 64   Weight: 73 2 kg (161 lb 6 4 oz)   Height: 5' 4" (1 626 m)                Physical Exam   Constitutional: She is oriented to person, place, and time  She appears well-developed and well-nourished  No distress  HENT:   Head: Normocephalic and atraumatic  Right Ear: External ear normal    Left Ear: External ear normal    Nose: Nose normal    Mouth/Throat: Oropharynx is clear and moist  No oropharyngeal exudate  Eyes: Pupils are equal, round, and reactive to light  Conjunctivae and EOM are normal    Neck: Normal range of motion  Neck supple  No tracheal deviation present  No thyromegaly present  Cardiovascular: Normal rate, regular rhythm and normal heart sounds  Exam reveals no friction rub  No murmur heard    Pulses:       Dorsalis pedis pulses are 2+ on the right side, and 2+ on the left side  Posterior tibial pulses are 2+ on the right side, and 2+ on the left side  Pulmonary/Chest: Effort normal and breath sounds normal  No respiratory distress  She has no wheezes  She has no rales  Abdominal: Soft  Bowel sounds are normal  She exhibits no distension  There is no tenderness  There is no rebound and no guarding  Musculoskeletal: Normal range of motion  She exhibits no edema or tenderness  Feet:   Right Foot:   Skin Integrity: Negative for ulcer, skin breakdown, erythema, warmth, callus or dry skin  Left Foot:   Skin Integrity: Negative for ulcer, skin breakdown, erythema, warmth, callus or dry skin  Lymphadenopathy:     She has no cervical adenopathy  Neurological: She is alert and oriented to person, place, and time  No cranial nerve deficit  Coordination normal    Skin: Skin is warm and dry  No rash noted  No erythema  Psychiatric: She has a normal mood and affect  Her behavior is normal  Thought content normal    Nursing note and vitals reviewed  Procedures     Patient's shoes and socks removed  Right Foot/Ankle   Right Foot Inspection  Skin Exam: skin normal and skin intact no dry skin, no warmth, no callus, no erythema, no maceration, no abnormal color, no pre-ulcer, no ulcer and no callus                          Toe Exam: ROM and strength within normal limits  Sensory   Vibration: intact  Proprioception: intact   Monofilament testing: intact  Vascular  Capillary refills: < 3 seconds  The right DP pulse is 2+  The right PT pulse is 2+     Right Toe  - Comprehensive Exam  Ecchymosis: none  Arch: normal  Hammertoes: absent  Claw Toes: absent  Swelling: none   Tenderness: none         Left Foot/Ankle  Left Foot Inspection  Skin Exam: skin normal and skin intactno dry skin, no warmth, no erythema, no maceration, normal color, no pre-ulcer, no ulcer and no callus                         Toe Exam: ROM and strength within normal limits                   Sensory   Vibration: intact  Proprioception: intact  Monofilament: intact  Vascular  Capillary refills: < 3 seconds  The left DP pulse is 2+  The left PT pulse is 2+  Left Toe  - Comprehensive Exam  Ecchymosis: none  Arch: normal  Hammertoes: absent  Claw toes: absent  Swelling: none   Tenderness: none       Assign Risk Category:  No deformity present;  No loss of protective sensation;        Risk: 0

## 2018-11-13 ENCOUNTER — ANTICOAG VISIT (OUTPATIENT)
Dept: CARDIOLOGY CLINIC | Facility: CLINIC | Age: 83
End: 2018-11-13

## 2018-11-13 DIAGNOSIS — Z95.2 HISTORY OF MITRAL VALVE REPLACEMENT: ICD-10-CM

## 2018-11-13 DIAGNOSIS — I48.0 PAROXYSMAL ATRIAL FIBRILLATION (HCC): ICD-10-CM

## 2018-11-13 DIAGNOSIS — Z79.891 LONG-TERM CURRENT USE OF OPIATE ANALGESIC: ICD-10-CM

## 2018-11-13 NOTE — PROGRESS NOTES
10/22/18, tc to pt, continue current dose, inr due 3 weeks, bharathi    11/13/18, above note copied today  bharathi    11/13/18, tc to patient, continue current dose, inr due 4 weeks   bharathi

## 2018-11-19 DIAGNOSIS — E78.2 MIXED HYPERLIPIDEMIA: ICD-10-CM

## 2018-11-19 DIAGNOSIS — I48.91 ATRIAL FIBRILLATION, UNSPECIFIED TYPE (HCC): ICD-10-CM

## 2018-11-20 RX ORDER — PRAVASTATIN SODIUM 40 MG
TABLET ORAL
Qty: 90 TABLET | Refills: 3 | Status: SHIPPED | OUTPATIENT
Start: 2018-11-20 | End: 2019-09-02 | Stop reason: SDUPTHER

## 2018-11-20 RX ORDER — WARFARIN SODIUM 5 MG/1
5 TABLET ORAL
Qty: 90 TABLET | Refills: 3 | Status: SHIPPED | OUTPATIENT
Start: 2018-11-20 | End: 2019-09-02 | Stop reason: SDUPTHER

## 2018-12-10 ENCOUNTER — ANTICOAG VISIT (OUTPATIENT)
Dept: CARDIOLOGY CLINIC | Facility: CLINIC | Age: 83
End: 2018-12-10

## 2018-12-10 ENCOUNTER — APPOINTMENT (OUTPATIENT)
Dept: LAB | Facility: CLINIC | Age: 83
End: 2018-12-10
Payer: MEDICARE

## 2018-12-10 DIAGNOSIS — I48.0 PAROXYSMAL ATRIAL FIBRILLATION (HCC): ICD-10-CM

## 2018-12-10 DIAGNOSIS — Z79.891 LONG-TERM CURRENT USE OF OPIATE ANALGESIC: ICD-10-CM

## 2018-12-10 DIAGNOSIS — Z95.2 HISTORY OF MITRAL VALVE REPLACEMENT: ICD-10-CM

## 2018-12-10 NOTE — PROGRESS NOTES
12/10/18, tc to pt, left message on answering machine  Decrease dose, 2 5 mg m/w/f, 5 mg other days of the week  inr due 1 week   bharathi

## 2018-12-19 ENCOUNTER — APPOINTMENT (OUTPATIENT)
Dept: LAB | Facility: CLINIC | Age: 83
End: 2018-12-19
Payer: MEDICARE

## 2018-12-19 DIAGNOSIS — E11.9 TYPE 2 DIABETES MELLITUS WITHOUT COMPLICATION, WITHOUT LONG-TERM CURRENT USE OF INSULIN (HCC): ICD-10-CM

## 2018-12-19 RX ORDER — METFORMIN HYDROCHLORIDE 500 MG/1
TABLET, EXTENDED RELEASE ORAL
Qty: 90 TABLET | Refills: 3 | Status: SHIPPED | OUTPATIENT
Start: 2018-12-19 | End: 2019-10-08 | Stop reason: SDUPTHER

## 2018-12-20 ENCOUNTER — ANTICOAG VISIT (OUTPATIENT)
Dept: CARDIOLOGY CLINIC | Facility: CLINIC | Age: 83
End: 2018-12-20

## 2018-12-20 DIAGNOSIS — Z79.891 LONG-TERM CURRENT USE OF OPIATE ANALGESIC: ICD-10-CM

## 2018-12-20 DIAGNOSIS — I48.0 PAROXYSMAL ATRIAL FIBRILLATION (HCC): ICD-10-CM

## 2018-12-20 DIAGNOSIS — Z95.2 HISTORY OF MITRAL VALVE REPLACEMENT: ICD-10-CM

## 2018-12-20 NOTE — PROGRESS NOTES
12/20/18, tc to pt, left message on answering machine, increase dose, 2 5 mg m/w, 5 mg other days of the week, inr due 2 weeks   bharathi

## 2019-01-02 ENCOUNTER — ANTICOAG VISIT (OUTPATIENT)
Dept: CARDIOLOGY CLINIC | Facility: CLINIC | Age: 84
End: 2019-01-02

## 2019-01-02 ENCOUNTER — APPOINTMENT (OUTPATIENT)
Dept: LAB | Facility: CLINIC | Age: 84
End: 2019-01-02
Payer: MEDICARE

## 2019-01-02 DIAGNOSIS — I48.0 PAROXYSMAL ATRIAL FIBRILLATION (HCC): ICD-10-CM

## 2019-01-02 DIAGNOSIS — Z79.01 LONG TERM CURRENT USE OF ANTICOAGULANT THERAPY: ICD-10-CM

## 2019-01-02 DIAGNOSIS — Z95.2 HEART VALVE REPLACED BY TRANSPLANT: ICD-10-CM

## 2019-01-02 DIAGNOSIS — Z95.2 HISTORY OF MITRAL VALVE REPLACEMENT: ICD-10-CM

## 2019-01-02 DIAGNOSIS — I48.91 ATRIAL FIBRILLATION, UNSPECIFIED TYPE (HCC): ICD-10-CM

## 2019-01-02 DIAGNOSIS — Z79.891 LONG-TERM CURRENT USE OF OPIATE ANALGESIC: ICD-10-CM

## 2019-01-02 LAB
INR PPP: 2.4 (ref 0.86–1.17)
INR PPP: 2.42 (ref 0.86–1.17)
PROTHROMBIN TIME: 26.4 SECONDS (ref 11.8–14.2)

## 2019-01-02 PROCEDURE — 36415 COLL VENOUS BLD VENIPUNCTURE: CPT

## 2019-01-02 PROCEDURE — 85610 PROTHROMBIN TIME: CPT

## 2019-01-02 NOTE — PROGRESS NOTES
Phone call to patient who states she had been on antibiotics when she had the 3 55 INR but never got ahold of [facility] to explain  She is now off the antibiotics for two weeks, inr is 2 4  Advised to take 5mgs tonight, then resume the 2 5 MW  And 5 others and have another inr in two weeks    josse

## 2019-01-16 ENCOUNTER — APPOINTMENT (OUTPATIENT)
Dept: LAB | Facility: CLINIC | Age: 84
End: 2019-01-16
Payer: MEDICARE

## 2019-01-16 ENCOUNTER — ANTICOAG VISIT (OUTPATIENT)
Dept: CARDIOLOGY CLINIC | Facility: CLINIC | Age: 84
End: 2019-01-16

## 2019-01-16 DIAGNOSIS — Z95.2 HEART VALVE REPLACED BY TRANSPLANT: ICD-10-CM

## 2019-01-16 DIAGNOSIS — Z79.891 LONG-TERM CURRENT USE OF OPIATE ANALGESIC: ICD-10-CM

## 2019-01-16 DIAGNOSIS — I48.91 ATRIAL FIBRILLATION, UNSPECIFIED TYPE (HCC): ICD-10-CM

## 2019-01-16 DIAGNOSIS — I48.0 PAROXYSMAL ATRIAL FIBRILLATION (HCC): ICD-10-CM

## 2019-01-16 DIAGNOSIS — Z95.2 HISTORY OF MITRAL VALVE REPLACEMENT: ICD-10-CM

## 2019-01-16 DIAGNOSIS — Z79.01 LONG TERM CURRENT USE OF ANTICOAGULANT THERAPY: ICD-10-CM

## 2019-01-16 LAB
INR PPP: 2.14 (ref 0.86–1.17)
PROTHROMBIN TIME: 24 SECONDS (ref 11.8–14.2)

## 2019-01-16 PROCEDURE — 36415 COLL VENOUS BLD VENIPUNCTURE: CPT

## 2019-01-16 PROCEDURE — 85610 PROTHROMBIN TIME: CPT

## 2019-01-16 NOTE — PROGRESS NOTES
1/16/19, tc to pt, will increase dose, 2 5 mg mon, 5 mg other days of the week, inr due 1-2 weeks   bharathi

## 2019-01-21 DIAGNOSIS — I10 HYPERTENSION, UNSPECIFIED TYPE: ICD-10-CM

## 2019-01-21 RX ORDER — ATENOLOL 50 MG/1
TABLET ORAL
Qty: 90 TABLET | Refills: 3 | Status: SHIPPED | OUTPATIENT
Start: 2019-01-21 | End: 2019-11-11 | Stop reason: SDUPTHER

## 2019-01-28 ENCOUNTER — APPOINTMENT (OUTPATIENT)
Dept: LAB | Facility: CLINIC | Age: 84
End: 2019-01-28
Payer: MEDICARE

## 2019-01-28 ENCOUNTER — OFFICE VISIT (OUTPATIENT)
Dept: PAIN MEDICINE | Facility: MEDICAL CENTER | Age: 84
End: 2019-01-28
Payer: MEDICARE

## 2019-01-28 ENCOUNTER — HOSPITAL ENCOUNTER (OUTPATIENT)
Dept: MAMMOGRAPHY | Facility: MEDICAL CENTER | Age: 84
Discharge: HOME | End: 2019-01-28
Payer: MEDICARE

## 2019-01-28 ENCOUNTER — TRANSCRIBE ORDERS (OUTPATIENT)
Dept: LAB | Facility: CLINIC | Age: 84
End: 2019-01-28

## 2019-01-28 VITALS
DIASTOLIC BLOOD PRESSURE: 66 MMHG | RESPIRATION RATE: 14 BRPM | SYSTOLIC BLOOD PRESSURE: 122 MMHG | HEIGHT: 64 IN | WEIGHT: 163.6 LBS | BODY MASS INDEX: 27.93 KG/M2 | HEART RATE: 60 BPM

## 2019-01-28 VITALS — BODY MASS INDEX: 27.49 KG/M2 | WEIGHT: 161 LBS | HEIGHT: 64 IN

## 2019-01-28 DIAGNOSIS — Z79.891 LONG-TERM CURRENT USE OF OPIATE ANALGESIC: ICD-10-CM

## 2019-01-28 DIAGNOSIS — G89.4 CHRONIC PAIN SYNDROME: ICD-10-CM

## 2019-01-28 DIAGNOSIS — F11.20 UNCOMPLICATED OPIOID DEPENDENCE (HCC): Primary | ICD-10-CM

## 2019-01-28 DIAGNOSIS — Z12.39 SCREENING BREAST EXAMINATION: ICD-10-CM

## 2019-01-28 DIAGNOSIS — M54.16 CHRONIC LUMBAR RADICULOPATHY: ICD-10-CM

## 2019-01-28 PROCEDURE — 77063 BREAST TOMOSYNTHESIS BI: CPT

## 2019-01-28 PROCEDURE — 99213 OFFICE O/P EST LOW 20 MIN: CPT | Performed by: NURSE PRACTITIONER

## 2019-01-28 PROCEDURE — 77067 SCR MAMMO BI INCL CAD: CPT

## 2019-01-28 RX ORDER — GABAPENTIN 300 MG/1
600 CAPSULE ORAL 3 TIMES DAILY
Qty: 540 CAPSULE | Refills: 0 | Status: SHIPPED | OUTPATIENT
Start: 2019-01-28 | End: 2019-04-15 | Stop reason: SDUPTHER

## 2019-01-28 NOTE — PROGRESS NOTES
Assessment:  1  Uncomplicated opioid dependence ([facility] [address] )    2  Long-term current use of opiate analgesic    3  Chronic pain syndrome    4  Chronic lumbar radiculopathy        Plan:  Continue to use tramadol as needed the patient does not require a refill today  Continue with gabapentin this was refilled to her mail order  While the patient was in the office today an opioid contract was thoroughly reviewed and signed by the patient  The patient was given adequate time to ask questions in regards to the contract and a signed copy was sent home for his/her records  Follow-up visit in 12 weeks for medication refills  There are risks associated with opioid medications, including dependence, addiction and tolerance  The patient understands and agrees to use these medications only as prescribed  Potential side effects of the medications include, but are not limited to, constipation, drowsiness, addiction, impaired judgment and risk of fatal overdose if not taken as prescribed  The patient was warned against driving while taking sedation medications  Sharing medications is a felony  At this point in time, the patient is showing no signs of addiction, abuse, diversion or suicidal ideation  A urine drug screen was collected at today's office visit as part of our medication management protocol  The point of care testing results were appropriate for what was being prescribed  The specimen will be sent for confirmatory testing  The drug screen is medically necessary because the patient is either dependent on opioid medication or is being considered for opioid medication therapy and the results could impact ongoing or future treatment  The drug screen is to evaluate for the presences or absence of prescribed, non-prescribed, and/or illicit drugs/substances      [place] Prescription Drug Monitoring Program report was reviewed and was appropriate         My impressions and treatment recommendations were discussed in detail with the patient who verbalized understanding and had no further questions  Discharge instructions were provided  I personally saw and examined the patient and I agree with the above discussed plan of care  Orders Placed This Encounter   Procedures    [facility] ALL_Prescribed Meds and Special Instructions     New Medications Ordered This Visit   Medications    gabapentin (NEURONTIN) 300 mg capsule     Sig: Take 2 capsules (600 mg total) by mouth 3 (three) times a day for 90 days 6 tablets daily     Dispense:  540 capsule     Refill:  0       History of Present Illness:    Tyler Almaraz is a 80 y o  female who presents for follow-up related to her chronic low back pain  Today she rates her pain 6/10 this is constant in nature most bothersome the morning  She describes her pain as dull, aching, and pressure-like  Patient takes tramadol 50 mg only on a very as needed basis generally when she know she is going to be out for the day  She tells me that she last took this on Friday January 25, 2019  She also takes gabapentin 300 mg 2 tablets 3 times a day with 20% relief and no side effects or issues  I have personally reviewed and/or updated the patient's past medical history, past surgical history, family history, social history, current medications, allergies, and vital signs today  Review of Systems:    Review of Systems   Respiratory: Negative for shortness of breath  Cardiovascular: Negative for chest pain  Gastrointestinal: Positive for constipation  Negative for diarrhea, nausea and vomiting  Musculoskeletal: Positive for joint swelling  Negative for arthralgias, gait problem and myalgias  Pain in arms and shoulders  Skin: Negative for rash  Neurological: Negative for dizziness, seizures and weakness  All other systems reviewed and are negative        Patient Active Problem List   Diagnosis    Arthritis    Atrial fibrillation ([facility] [address] )    Chronic congestive heart failure (HCC)    Chronic lumbar radiculopathy    Coagulation defect (HCC)    Hemorrhoids    History of mitral valve replacement    Hyperlipidemia    Hypertension    Myofascial pain syndrome    Osteopenia    Chronic pain syndrome    Sacroiliitis (HCC)    Type 2 diabetes mellitus ([facility] [address] )    Vitamin D deficiency    Long-term current use of opiate analgesic       Past Medical History:   Diagnosis Date    Arthritis     Benign polyp of large intestine     Chronic pain disorder     bilateral back - last assessed 12/27/17    Diabetes mellitus ([facility] [address] )     Endocarditis     Hypertension     Hypertension     Spinal stenosis     Stroke syndrome 1985       Past Surgical History:   Procedure Laterality Date    BACK SURGERY      CARPAL TUNNEL RELEASE Left     CATARACT EXTRACTION, BILATERAL  2011    CHOLECYSTECTOMY      FOOT SURGERY Right     GALLBLADDER SURGERY      LUMBAR LAMINECTOMY Right 11/08/2012    L3-4 min   invasive far lateral microdiscectomy for decompression of nerve root, transforaminal epidural steroid injection; operating room microscope for microdissection    MITRAL VALVE REPLACEMENT      biolical valve failed [id number]  [place] [address]  [facility] N/A 3/17/2017    Procedure: REPAIR HERNIA UMBILICAL;  Surgeon: Veronica Wilson DO;  Location: [facility];  Service:     VALVE REPLACEMENT         Family History   Problem Relation Age of Onset    Clotting disorder Mother         disorder of blood and blood forming organs    Heart attack Father     Cancer Maternal Grandfather         doesnt know age   Jazzminee Liming Heart disease Paternal Grandfather     Diabetes Family        Social History     Occupational History    Retired      Social History Main Topics    Smoking status: Never Smoker    Smokeless tobacco: Never Used    Alcohol use No    Drug use: No    Sexual activity: Not on file       Current Outpatient Prescriptions on File Prior to Visit   Medication Sig    atenolol (TENORMIN) 50 mg tablet TAKE 1 TABLET EVERY DAY    losartan-hydrochlorothiazide (HYZAAR) 100-12 5 MG per tablet TAKE 1 TABLET EVERY DAY    metFORMIN (GLUCOPHAGE-XR) 500 mg 24 hr tablet TAKE 1 TABLET EVERY DAY    pravastatin (PRAVACHOL) 40 mg tablet TAKE 1 TABLET EVERY DAY    traMADol (ULTRAM) 50 mg tablet Take 1 tablet (50 mg total) by mouth daily as needed for moderate pain    warfarin (COUMADIN) 5 mg tablet TAKE 1 TABLET (5 MG TOTAL) BY MOUTH DAILY    [DISCONTINUED] gabapentin (NEURONTIN) 300 mg capsule Take 2 capsules (600 mg total) by mouth 3 (three) times a day 6 tablets daily    potassium chloride (K-DUR,KLOR-CON) 10 mEq tablet Take 1 tablet (10 mEq total) by mouth 2 (two) times a day (Patient not taking: Reported on 1/28/2019 )     No current facility-administered medications on file prior to visit  Allergies   Allergen Reactions    Lisinopril      cough             Tramadol last taken 1/25  New Agreement and UDS today  Physical Exam:    /66   Pulse 60   Resp 14   Ht 5' 4" (1 626 m)   Wt 74 2 kg (163 lb 9 6 oz)   BMI 28 08 kg/m²     Constitutional: normal, well developed, well nourished, alert, in no distress and non-toxic and no overt pain behavior    Eyes: anicteric  HEENT: grossly intact  Neck: supple, symmetric, trachea midline and no masses   Pulmonary:even and unlabored  Cardiovascular:No edema or pitting edema present  Skin:Normal without rashes or lesions and well hydrated  Psychiatric:Mood and affect appropriate  Neurologic:Cranial Nerves II-XII grossly intact  Musculoskeletal:ambulates with cane    Imaging

## 2019-01-29 ENCOUNTER — ANTICOAG VISIT (OUTPATIENT)
Dept: CARDIOLOGY CLINIC | Facility: CLINIC | Age: 84
End: 2019-01-29

## 2019-01-29 DIAGNOSIS — Z79.891 LONG-TERM CURRENT USE OF OPIATE ANALGESIC: ICD-10-CM

## 2019-01-29 DIAGNOSIS — Z95.2 HISTORY OF MITRAL VALVE REPLACEMENT: ICD-10-CM

## 2019-01-29 DIAGNOSIS — I48.0 PAROXYSMAL ATRIAL FIBRILLATION (HCC): ICD-10-CM

## 2019-02-14 ENCOUNTER — APPOINTMENT (OUTPATIENT)
Dept: LAB | Facility: CLINIC | Age: 84
End: 2019-02-14
Payer: MEDICARE

## 2019-02-15 ENCOUNTER — ANTICOAG VISIT (OUTPATIENT)
Dept: CARDIOLOGY CLINIC | Facility: CLINIC | Age: 84
End: 2019-02-15

## 2019-02-15 DIAGNOSIS — I48.0 PAROXYSMAL ATRIAL FIBRILLATION (HCC): ICD-10-CM

## 2019-02-15 DIAGNOSIS — Z95.2 HISTORY OF MITRAL VALVE REPLACEMENT: ICD-10-CM

## 2019-02-15 DIAGNOSIS — Z79.891 LONG-TERM CURRENT USE OF OPIATE ANALGESIC: ICD-10-CM

## 2019-02-15 NOTE — PROGRESS NOTES
2/15/19, tc to pt, denies any bleeding  Will decrease dose, 2 5 mg fri, 5 mg other days of the week, inr due 1 week   bharathi

## 2019-02-21 ENCOUNTER — APPOINTMENT (OUTPATIENT)
Dept: LAB | Facility: CLINIC | Age: 84
End: 2019-02-21
Payer: MEDICARE

## 2019-02-22 ENCOUNTER — OFFICE VISIT (OUTPATIENT)
Dept: CARDIOLOGY CLINIC | Facility: CLINIC | Age: 84
End: 2019-02-22
Payer: MEDICARE

## 2019-02-22 ENCOUNTER — ANTICOAG VISIT (OUTPATIENT)
Dept: CARDIOLOGY CLINIC | Facility: CLINIC | Age: 84
End: 2019-02-22

## 2019-02-22 VITALS
WEIGHT: 167.2 LBS | BODY MASS INDEX: 28.54 KG/M2 | SYSTOLIC BLOOD PRESSURE: 124 MMHG | RESPIRATION RATE: 16 BRPM | HEART RATE: 66 BPM | HEIGHT: 64 IN | DIASTOLIC BLOOD PRESSURE: 82 MMHG

## 2019-02-22 DIAGNOSIS — E78.5 DYSLIPIDEMIA: ICD-10-CM

## 2019-02-22 DIAGNOSIS — I48.0 PAROXYSMAL ATRIAL FIBRILLATION (HCC): ICD-10-CM

## 2019-02-22 DIAGNOSIS — I48.91 ATRIAL FIBRILLATION, UNSPECIFIED TYPE (HCC): Primary | ICD-10-CM

## 2019-02-22 DIAGNOSIS — I10 BENIGN ESSENTIAL HTN: ICD-10-CM

## 2019-02-22 DIAGNOSIS — Z79.891 LONG-TERM CURRENT USE OF OPIATE ANALGESIC: ICD-10-CM

## 2019-02-22 DIAGNOSIS — Z95.2 HISTORY OF MITRAL VALVE REPLACEMENT: ICD-10-CM

## 2019-02-22 DIAGNOSIS — Z95.2 H/O MITRAL VALVE REPLACEMENT WITH MECHANICAL VALVE: ICD-10-CM

## 2019-02-22 PROCEDURE — 93000 ELECTROCARDIOGRAM COMPLETE: CPT | Performed by: INTERNAL MEDICINE

## 2019-02-22 PROCEDURE — 99214 OFFICE O/P EST MOD 30 MIN: CPT | Performed by: INTERNAL MEDICINE

## 2019-02-22 NOTE — PROGRESS NOTES
2/22/19, pt seen at  with dr Eula Hodgkins, will continue current dose, inr due 10 days   Pt will introduce small amount of salad daily into one meal  bharathi

## 2019-02-22 NOTE — PROGRESS NOTES
Cardiology Follow Up        Hitesh Awan      1934      [id number]      Discussion/Summary:    1  History of mechanical mitral valve replacement, 1997, prior history of bioprosthesis in 1986 which later failed   2  Permanent atrial fibrillation  3  Benign essential hypertension  4  Trace lower extremity edema, stable  5  Dyslipidemia  6  Benign essential hypertension      · Continue warfarin, tolerating well, did discuss some lability that she has in her INR levels, due to recent antibiotic use for cellulitis  Overall she is doing well  Antibiotic prophylaxis before any dental work, and Lovenox bridging prior to interruption of warfarin recommended  · Atrial fibrillation rate controlled, continue atenolol and warfarin anticoagulation  · Blood pressure well controlled, continue losartan/hydrochlorothiazide  · Lipid panel to be checked in near future by PCP, continue pravastatin for now    Follow-up in 6 months        Interval History: This is a very pleasant 51-year-old female with a 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 prolapse, who underwent mitral valve replacement with a bioprosthesis  This failed about 10 years later and in 1997 she received a [place] mechanical mitral valve prosthesis  She has a history of permanent atrial fibrillation discovered on Holter monitoring in 2009, was followed Dr Richmond Strickland at [facility] for many years then switched over to our office  She has a 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, possibly residual surgical material     She was last seen 07/2018 at which time she was doing well  She presents today for follow-up  She has complaints of back pain, but from a cardiac standpoint is doing well without exertional dyspnea, chest pain, lower extremity edema, dizziness, palpitations, presyncope or syncope  She is tolerating warfarin very well  Vitals:  Vitals:    02/22/19 0925   BP: 124/82   Pulse: 66   Resp: 16   Weight: 75 8 kg (167 lb 3 2 oz)   Height: 5' 4" (1 626 m)         Past Medical History:   Diagnosis Date    Arthritis     Benign polyp of large intestine     Chronic pain disorder     bilateral back - last assessed 12/27/17    Diabetes mellitus ([facility] [address] [facility]     Endocarditis     Hypertension     Hypertension     Spinal stenosis     Stroke syndrome 1985     Social History     Socioeconomic History    Marital status:       Spouse name: Not on file    Number of children: 2    Years of education: Not on file    Highest education level: Not on file   Occupational History    Occupation: Retired   Social Needs    Financial resource strain: Not on file    Food insecurity:     Worry: Not on file     Inability: Not on file   [employer] needs:     Medical: Not on file     Non-medical: Not on file   Tobacco Use    Smoking status: Never Smoker    Smokeless tobacco: Never Used   Substance and Sexual Activity    Alcohol use: No    Drug use: No    Sexual activity: Not on file   Lifestyle    Physical activity:     Days per week: Not on file     Minutes per session: Not on file    Stress: Not on file   Relationships    Social connections:     Talks on phone: Not on file     Gets together: Not on file     Attends Confucianism service: Not on file     Active member of club or organization: Not on file     Attends meetings of clubs or organizations: Not on file     Relationship status: Not on file    Intimate partner violence:     Fear of current or ex partner: Not on file     Emotionally abused: Not on file     Physically abused: Not on file     Forced sexual activity: Not on file   Other Topics Concern    Not on file   Social History Narrative    Not on file      Family History   Problem Relation Age of Onset    Clotting disorder Mother         disorder of blood and blood forming organs    Heart attack Father     Cancer Maternal Grandfather         doesnt know age   [facility] Heart disease Paternal Grandfather     Diabetes Family      Past Surgical History:   Procedure Laterality Date    BACK SURGERY      CARPAL TUNNEL RELEASE Left     CATARACT EXTRACTION, BILATERAL  2011    CHOLECYSTECTOMY      FOOT SURGERY Right     GALLBLADDER SURGERY      LUMBAR LAMINECTOMY Right 11/08/2012    L3-4 min  invasive far lateral microdiscectomy for decompression of nerve root, transforaminal epidural steroid injection; operating room microscope for microdissection    MITRAL VALVE REPLACEMENT      biolical valve failed [address] [facility]  [place] [address] N/A 3/17/2017    Procedure: REPAIR HERNIA UMBILICAL;  Surgeon: Sandor Wong DO;  [facility];  Service:     VALVE REPLACEMENT         Current Outpatient Medications:     atenolol (TENORMIN) 50 mg tablet, TAKE 1 TABLET EVERY DAY, Disp: 90 tablet, Rfl: 3    gabapentin (NEURONTIN) 300 mg capsule, Take 2 capsules (600 mg total) by mouth 3 (three) times a day for 90 days 6 tablets daily, Disp: 540 capsule, Rfl: 0    losartan-hydrochlorothiazide (HYZAAR) 100-12 5 MG per tablet, TAKE 1 TABLET EVERY DAY, Disp: 90 tablet, Rfl: 3    metFORMIN (GLUCOPHAGE-XR) 500 mg 24 hr tablet, TAKE 1 TABLET EVERY DAY, Disp: 90 tablet, Rfl: 3    pravastatin (PRAVACHOL) 40 mg tablet, TAKE 1 TABLET EVERY DAY, Disp: 90 tablet, Rfl: 3    traMADol (ULTRAM) 50 mg tablet, Take 1 tablet (50 mg total) by mouth daily as needed for moderate pain, Disp: 30 tablet, Rfl: 1    warfarin (COUMADIN) 5 mg tablet, TAKE 1 TABLET (5 MG TOTAL) BY MOUTH DAILY, Disp: 90 tablet, Rfl: 3        Review of Systems:  Review of Systems   Constitutional: Negative for activity change, fever and unexpected weight change  HENT: Negative for facial swelling, nosebleeds and voice change  Respiratory: Negative for chest tightness, shortness of breath and wheezing      Cardiovascular: Negative for chest pain, palpitations and leg swelling  Gastrointestinal: Negative for abdominal distention  Genitourinary: Negative for hematuria  Musculoskeletal: Positive for arthralgias and back pain  Skin: Negative for color change, pallor, rash and wound  Neurological: Negative for dizziness, seizures and syncope  Psychiatric/Behavioral: Negative for agitation  Physical Exam:  Physical Exam   Constitutional: She is oriented to person, place, and time  She appears well-developed and well-nourished  HENT:   Head: Normocephalic and atraumatic  Eyes: EOM are normal    Neck: Normal range of motion  Neck supple  Cardiovascular: Normal rate, regular rhythm and intact distal pulses  S1 click   Pulmonary/Chest: Effort normal and breath sounds normal    Abdominal: Soft  Musculoskeletal: Normal range of motion  Neurological: She is alert and oriented to person, place, and time  Skin: Skin is warm and dry  Psychiatric: She has a normal mood and affect  Her behavior is normal  Judgment and thought content normal    Vitals reviewed  This note was completed in part utilizing M-Modal Fluency Direct Software  Grammatical errors, random word insertions, spelling mistakes, and incomplete sentences can be an occasional consequence of this system secondary to software limitations, ambient noise, and hardware issues  If you have any questions or concerns about the content, text, or information contained within the body of this dictation, please contact the provider for clarification

## 2019-03-04 ENCOUNTER — APPOINTMENT (OUTPATIENT)
Dept: LAB | Facility: CLINIC | Age: 84
End: 2019-03-04
Payer: MEDICARE

## 2019-03-04 ENCOUNTER — TRANSCRIBE ORDERS (OUTPATIENT)
Dept: LAB | Facility: CLINIC | Age: 84
End: 2019-03-04

## 2019-03-05 ENCOUNTER — ANTICOAG VISIT (OUTPATIENT)
Dept: CARDIOLOGY CLINIC | Facility: CLINIC | Age: 84
End: 2019-03-05

## 2019-03-05 DIAGNOSIS — Z79.891 LONG-TERM CURRENT USE OF OPIATE ANALGESIC: ICD-10-CM

## 2019-03-05 DIAGNOSIS — Z95.2 HISTORY OF MITRAL VALVE REPLACEMENT: ICD-10-CM

## 2019-03-05 DIAGNOSIS — I48.0 PAROXYSMAL ATRIAL FIBRILLATION (HCC): ICD-10-CM

## 2019-03-05 NOTE — PROGRESS NOTES
3/5/19, tc to pt, will continue current dose  And current diet   inr due 1 week, she has blood work to be done for her pcp next week, bharathi

## 2019-03-12 ENCOUNTER — APPOINTMENT (OUTPATIENT)
Dept: LAB | Facility: CLINIC | Age: 84
End: 2019-03-12
Payer: MEDICARE

## 2019-03-12 ENCOUNTER — ANTICOAG VISIT (OUTPATIENT)
Dept: CARDIOLOGY CLINIC | Facility: CLINIC | Age: 84
End: 2019-03-12

## 2019-03-12 DIAGNOSIS — Z95.2 HISTORY OF MITRAL VALVE REPLACEMENT: ICD-10-CM

## 2019-03-12 DIAGNOSIS — E11.9 TYPE 2 DIABETES MELLITUS WITHOUT COMPLICATION, WITHOUT LONG-TERM CURRENT USE OF INSULIN (HCC): ICD-10-CM

## 2019-03-12 DIAGNOSIS — M46.1 SACROILIITIS (HCC): ICD-10-CM

## 2019-03-12 DIAGNOSIS — E55.9 VITAMIN D DEFICIENCY: ICD-10-CM

## 2019-03-12 DIAGNOSIS — I50.9 CHRONIC CONGESTIVE HEART FAILURE, UNSPECIFIED HEART FAILURE TYPE (HCC): ICD-10-CM

## 2019-03-12 DIAGNOSIS — E78.2 MIXED HYPERLIPIDEMIA: ICD-10-CM

## 2019-03-12 DIAGNOSIS — Z79.891 LONG-TERM CURRENT USE OF OPIATE ANALGESIC: ICD-10-CM

## 2019-03-12 DIAGNOSIS — I48.0 PAROXYSMAL ATRIAL FIBRILLATION (HCC): ICD-10-CM

## 2019-03-12 LAB
25(OH)D3 SERPL-MCNC: 41.8 NG/ML (ref 30–100)
ALBUMIN SERPL BCP-MCNC: 3.5 G/DL (ref 3.5–5)
ALP SERPL-CCNC: 50 U/L (ref 46–116)
ALT SERPL W P-5'-P-CCNC: 25 U/L (ref 12–78)
ANION GAP SERPL CALCULATED.3IONS-SCNC: 5 MMOL/L (ref 4–13)
AST SERPL W P-5'-P-CCNC: 17 U/L (ref 5–45)
BASOPHILS # BLD AUTO: 0.05 THOUSANDS/ΜL (ref 0–0.1)
BASOPHILS NFR BLD AUTO: 1 % (ref 0–1)
BILIRUB SERPL-MCNC: 0.81 MG/DL (ref 0.2–1)
BUN SERPL-MCNC: 22 MG/DL (ref 5–25)
CALCIUM SERPL-MCNC: 9.2 MG/DL (ref 8.3–10.1)
CHLORIDE SERPL-SCNC: 104 MMOL/L (ref 100–108)
CHOLEST SERPL-MCNC: 154 MG/DL (ref 50–200)
CO2 SERPL-SCNC: 32 MMOL/L (ref 21–32)
CREAT SERPL-MCNC: 0.71 MG/DL (ref 0.6–1.3)
EOSINOPHIL # BLD AUTO: 0.06 THOUSAND/ΜL (ref 0–0.61)
EOSINOPHIL NFR BLD AUTO: 1 % (ref 0–6)
ERYTHROCYTE [DISTWIDTH] IN BLOOD BY AUTOMATED COUNT: 13.2 % (ref 11.6–15.1)
EST. AVERAGE GLUCOSE BLD GHB EST-MCNC: 131 MG/DL
GFR SERPL CREATININE-BSD FRML MDRD: 78 ML/MIN/1.73SQ M
GLUCOSE P FAST SERPL-MCNC: 115 MG/DL (ref 65–99)
HBA1C MFR BLD: 6.2 % (ref 4.2–6.3)
HCT VFR BLD AUTO: 41.6 % (ref 34.8–46.1)
HDLC SERPL-MCNC: 53 MG/DL (ref 40–60)
HGB BLD-MCNC: 13.3 G/DL (ref 11.5–15.4)
IMM GRANULOCYTES # BLD AUTO: 0.02 THOUSAND/UL (ref 0–0.2)
IMM GRANULOCYTES NFR BLD AUTO: 0 % (ref 0–2)
LDLC SERPL CALC-MCNC: 85 MG/DL (ref 0–100)
LYMPHOCYTES # BLD AUTO: 1.63 THOUSANDS/ΜL (ref 0.6–4.47)
LYMPHOCYTES NFR BLD AUTO: 22 % (ref 14–44)
MCH RBC QN AUTO: 30.1 PG (ref 26.8–34.3)
MCHC RBC AUTO-ENTMCNC: 32 G/DL (ref 31.4–37.4)
MCV RBC AUTO: 94 FL (ref 82–98)
MONOCYTES # BLD AUTO: 0.57 THOUSAND/ΜL (ref 0.17–1.22)
MONOCYTES NFR BLD AUTO: 8 % (ref 4–12)
NEUTROPHILS # BLD AUTO: 5.09 THOUSANDS/ΜL (ref 1.85–7.62)
NEUTS SEG NFR BLD AUTO: 68 % (ref 43–75)
NRBC BLD AUTO-RTO: 0 /100 WBCS
PLATELET # BLD AUTO: 252 THOUSANDS/UL (ref 149–390)
PMV BLD AUTO: 11.2 FL (ref 8.9–12.7)
POTASSIUM SERPL-SCNC: 3.4 MMOL/L (ref 3.5–5.3)
PROT SERPL-MCNC: 6.7 G/DL (ref 6.4–8.2)
RBC # BLD AUTO: 4.42 MILLION/UL (ref 3.81–5.12)
SODIUM SERPL-SCNC: 141 MMOL/L (ref 136–145)
TRIGL SERPL-MCNC: 82 MG/DL
TSH SERPL DL<=0.05 MIU/L-ACNC: 0.43 UIU/ML (ref 0.36–3.74)
WBC # BLD AUTO: 7.42 THOUSAND/UL (ref 4.31–10.16)

## 2019-03-12 PROCEDURE — 83036 HEMOGLOBIN GLYCOSYLATED A1C: CPT

## 2019-03-12 PROCEDURE — 85025 COMPLETE CBC W/AUTO DIFF WBC: CPT

## 2019-03-12 PROCEDURE — 80061 LIPID PANEL: CPT

## 2019-03-12 PROCEDURE — 82306 VITAMIN D 25 HYDROXY: CPT

## 2019-03-12 PROCEDURE — 80053 COMPREHEN METABOLIC PANEL: CPT

## 2019-03-12 PROCEDURE — 84443 ASSAY THYROID STIM HORMONE: CPT

## 2019-03-25 ENCOUNTER — OFFICE VISIT (OUTPATIENT)
Dept: FAMILY MEDICINE CLINIC | Facility: CLINIC | Age: 84
End: 2019-03-25
Payer: MEDICARE

## 2019-03-25 VITALS
DIASTOLIC BLOOD PRESSURE: 70 MMHG | BODY MASS INDEX: 28.17 KG/M2 | WEIGHT: 165 LBS | HEIGHT: 64 IN | SYSTOLIC BLOOD PRESSURE: 138 MMHG | HEART RATE: 64 BPM

## 2019-03-25 DIAGNOSIS — E11.9 TYPE 2 DIABETES MELLITUS WITHOUT COMPLICATION, WITHOUT LONG-TERM CURRENT USE OF INSULIN (HCC): ICD-10-CM

## 2019-03-25 DIAGNOSIS — M46.1 SACROILIITIS (HCC): ICD-10-CM

## 2019-03-25 DIAGNOSIS — I48.0 PAROXYSMAL ATRIAL FIBRILLATION (HCC): ICD-10-CM

## 2019-03-25 DIAGNOSIS — Z23 NEED FOR TETANUS BOOSTER: Primary | ICD-10-CM

## 2019-03-25 DIAGNOSIS — I10 ESSENTIAL HYPERTENSION: ICD-10-CM

## 2019-03-25 DIAGNOSIS — I50.9 CHRONIC CONGESTIVE HEART FAILURE, UNSPECIFIED HEART FAILURE TYPE (HCC): ICD-10-CM

## 2019-03-25 PROCEDURE — 99214 OFFICE O/P EST MOD 30 MIN: CPT | Performed by: PHYSICIAN ASSISTANT

## 2019-03-25 NOTE — PROGRESS NOTES
Assessment and Plan:  Patient Instructions   1  Type 2 diabetes-presently stable with hemoglobin A1c of 6 2 on metformin therapy  Patient is up-to-date with foot exam and eye exams  Recommend follow-up in 4 months with labs  2  Atrial fibrillation-stable per Dr Freddy Vu, cardiology  Continues warfarin therapy  Continues beta-blocker  3  Congestive heart failure-stable per Dr Freddy Vu on beta-blocker and Arb therapy  4   Sacroiliitis-stable with gabapentin per pain management  Patient also uses tramadol when necessary  5  Hypertension-stable, no medication changes        Problem List Items Addressed This Visit        Endocrine    Type 2 diabetes mellitus ([facility] [address] )    Relevant Orders    CBC and differential    Comprehensive metabolic panel    Hemoglobin A1C    Lipid Panel with Direct LDL reflex    TSH, 3rd generation with Free T4 reflex    Microalbumin / creatinine urine ratio       Cardiovascular and Mediastinum    Atrial fibrillation (HCC)    Chronic congestive heart failure (HCC)    Relevant Orders    CBC and differential    Comprehensive metabolic panel    Hemoglobin A1C    Lipid Panel with Direct LDL reflex    TSH, 3rd generation with Free T4 reflex    Microalbumin / creatinine urine ratio    Hypertension    Relevant Orders    CBC and differential    Comprehensive metabolic panel    Hemoglobin A1C    Lipid Panel with Direct LDL reflex    TSH, 3rd generation with Free T4 reflex    Microalbumin / creatinine urine ratio       Musculoskeletal and Integument    Sacroiliitis (HCC)    Relevant Orders    CBC and differential    Comprehensive metabolic panel    Hemoglobin A1C    Lipid Panel with Direct LDL reflex    TSH, 3rd generation with Free T4 reflex    Microalbumin / creatinine urine ratio      Other Visit Diagnoses     Need for tetanus booster    -  Primary    Relevant Orders    TDAP VACCINE GREATER THAN OR EQUAL TO 8YO IM                 Diagnoses and all orders for this visit:    Need for tetanus booster  - TDAP VACCINE GREATER THAN OR EQUAL TO 8YO IM    Type 2 diabetes mellitus without complication, without long-term current use of insulin (HCC)  -     CBC and differential; Future  -     Comprehensive metabolic panel; Future  -     Hemoglobin A1C; Future  -     Lipid Panel with Direct LDL reflex; Future  -     TSH, 3rd generation with Free T4 reflex; Future  -     Microalbumin / creatinine urine ratio; Future    Chronic congestive heart failure, unspecified heart failure type (HCC)  -     CBC and differential; Future  -     Comprehensive metabolic panel; Future  -     Hemoglobin A1C; Future  -     Lipid Panel with Direct LDL reflex; Future  -     TSH, 3rd generation with Free T4 reflex; Future  -     Microalbumin / creatinine urine ratio; Future    Essential hypertension  -     CBC and differential; Future  -     Comprehensive metabolic panel; Future  -     Hemoglobin A1C; Future  -     Lipid Panel with Direct LDL reflex; Future  -     TSH, 3rd generation with Free T4 reflex; Future  -     Microalbumin / creatinine urine ratio; Future    Sacroiliitis (HCC)  -     CBC and differential; Future  -     Comprehensive metabolic panel; Future  -     Hemoglobin A1C; Future  -     Lipid Panel with Direct LDL reflex; Future  -     TSH, 3rd generation with Free T4 reflex; Future  -     Microalbumin / creatinine urine ratio; Future    Paroxysmal atrial fibrillation (HCC)              Subjective:      Patient ID: Terrie Guthrie is a 80 y o  female  CC:    Chief Complaint   Patient presents with    Diabetes     pt also has blood work to review~cd       HPI:    HPI:  This is an 43-year-old female who presents to the office for follow-up of chronic health conditions including atrial fibrillation, hypertension, hyperlipidemia, congestive heart failure, and sacroiliitis  She has been feeling well for the most part without any acute complaints    She does continue to follow with Cardiology for congestive heart failure and atrial fibrillation as well as Coumadin monitoring  She does continue beta-blocker therapy and losartan with hydrochlorothiazide  She denies side effects or problems with the medication  She also has a history of sacroiliitis and has been following with pain management for gabapentin and tramadol which has helped alleviate the pain thus far  Patient does continue to see Ophthalmology regularly and get foot exams for diabetic care  She is currently maintained on metformin therapy  The following portions of the patient's history were reviewed and updated as appropriate: allergies, current medications, past family history, past medical history, past social history, past surgical history and problem list       Review of Systems   Constitutional: Negative for chills, fatigue and fever  HENT: Negative for congestion, ear pain and sinus pressure  Eyes: Negative for visual disturbance  Respiratory: Negative for cough, chest tightness and shortness of breath  Cardiovascular: Negative for chest pain and palpitations  Gastrointestinal: Negative for diarrhea, nausea and vomiting  Endocrine: Negative for polyuria  Genitourinary: Negative for dysuria and frequency  Musculoskeletal: Negative for arthralgias and myalgias  Skin: Negative for pallor and rash  Neurological: Negative for dizziness, weakness, light-headedness, numbness and headaches  Psychiatric/Behavioral: Negative for agitation, behavioral problems and sleep disturbance  All other systems reviewed and are negative  Data to review:       Objective:    Vitals:    03/25/19 1210   BP: 138/70   BP Location: Left arm   Patient Position: Sitting   Cuff Size: Large   Pulse: 64   Weight: 74 8 kg (165 lb)   Height: 5' 4" (1 626 m)        Physical Exam   Constitutional: She is oriented to person, place, and time  She appears well-developed and well-nourished  No distress  HENT:   Head: Normocephalic and atraumatic     Right Ear: External ear normal    Left Ear: External ear normal    Nose: Nose normal    Mouth/Throat: Oropharynx is clear and moist  No oropharyngeal exudate  Eyes: Pupils are equal, round, and reactive to light  Conjunctivae and EOM are normal    Neck: Normal range of motion  Neck supple  No tracheal deviation present  No thyromegaly present  Cardiovascular: Normal rate, regular rhythm and normal heart sounds  Exam reveals no friction rub  No murmur heard  Pulmonary/Chest: Effort normal and breath sounds normal  No respiratory distress  She has no wheezes  She has no rales  Abdominal: Soft  Bowel sounds are normal  She exhibits no distension  There is no tenderness  There is no rebound and no guarding  Musculoskeletal: Normal range of motion  She exhibits no edema or tenderness  Lymphadenopathy:     She has no cervical adenopathy  Neurological: She is alert and oriented to person, place, and time  No cranial nerve deficit  Coordination normal    Skin: Skin is warm and dry  No rash noted  No erythema  Psychiatric: She has a normal mood and affect  Her behavior is normal  Thought content normal    Nursing note and vitals reviewed

## 2019-03-25 NOTE — PATIENT INSTRUCTIONS
1  Type 2 diabetes-presently stable with hemoglobin A1c of 6 2 on metformin therapy  Patient is up-to-date with foot exam and eye exams  Recommend follow-up in 4 months with labs  2  Atrial fibrillation-stable per Dr Rupert Shen, cardiology  Continues warfarin therapy  Continues beta-blocker  3  Congestive heart failure-stable per Dr Rupert Shen on beta-blocker and Arb therapy  4   Sacroiliitis-stable with gabapentin per pain management  Patient also uses tramadol when necessary  5  Hypertension-stable, no medication changes

## 2019-04-03 ENCOUNTER — TRANSCRIBE ORDERS (OUTPATIENT)
Dept: LAB | Facility: CLINIC | Age: 84
End: 2019-04-03

## 2019-04-03 ENCOUNTER — APPOINTMENT (OUTPATIENT)
Dept: LAB | Facility: CLINIC | Age: 84
End: 2019-04-03
Payer: MEDICARE

## 2019-04-03 ENCOUNTER — ANTICOAG VISIT (OUTPATIENT)
Dept: CARDIOLOGY CLINIC | Facility: CLINIC | Age: 84
End: 2019-04-03

## 2019-04-03 DIAGNOSIS — I48.0 PAROXYSMAL ATRIAL FIBRILLATION (HCC): ICD-10-CM

## 2019-04-03 DIAGNOSIS — Z79.891 LONG-TERM CURRENT USE OF OPIATE ANALGESIC: ICD-10-CM

## 2019-04-03 DIAGNOSIS — Z95.2 HISTORY OF MITRAL VALVE REPLACEMENT: ICD-10-CM

## 2019-04-08 DIAGNOSIS — I10 ESSENTIAL HYPERTENSION: ICD-10-CM

## 2019-04-09 RX ORDER — LOSARTAN POTASSIUM AND HYDROCHLOROTHIAZIDE 12.5; 1 MG/1; MG/1
TABLET ORAL
Qty: 90 TABLET | Refills: 3 | Status: SHIPPED | OUTPATIENT
Start: 2019-04-09 | End: 2020-01-23

## 2019-04-15 ENCOUNTER — OFFICE VISIT (OUTPATIENT)
Dept: PAIN MEDICINE | Facility: MEDICAL CENTER | Age: 84
End: 2019-04-15
Payer: MEDICARE

## 2019-04-15 VITALS
RESPIRATION RATE: 16 BRPM | WEIGHT: 165.2 LBS | BODY MASS INDEX: 28.2 KG/M2 | HEIGHT: 64 IN | HEART RATE: 76 BPM | SYSTOLIC BLOOD PRESSURE: 110 MMHG | DIASTOLIC BLOOD PRESSURE: 60 MMHG

## 2019-04-15 DIAGNOSIS — G89.4 PAIN SYNDROME, CHRONIC: ICD-10-CM

## 2019-04-15 DIAGNOSIS — G89.4 CHRONIC PAIN SYNDROME: ICD-10-CM

## 2019-04-15 DIAGNOSIS — M46.1 SACROILIITIS (HCC): ICD-10-CM

## 2019-04-15 DIAGNOSIS — M54.42 CHRONIC BILATERAL LOW BACK PAIN WITH BILATERAL SCIATICA: ICD-10-CM

## 2019-04-15 DIAGNOSIS — M54.41 CHRONIC BILATERAL LOW BACK PAIN WITH BILATERAL SCIATICA: ICD-10-CM

## 2019-04-15 DIAGNOSIS — G89.29 CHRONIC BILATERAL LOW BACK PAIN WITH BILATERAL SCIATICA: ICD-10-CM

## 2019-04-15 DIAGNOSIS — M79.18 MYOFASCIAL PAIN SYNDROME: ICD-10-CM

## 2019-04-15 DIAGNOSIS — M54.16 CHRONIC LUMBAR RADICULOPATHY: Primary | ICD-10-CM

## 2019-04-15 PROCEDURE — 99214 OFFICE O/P EST MOD 30 MIN: CPT | Performed by: NURSE PRACTITIONER

## 2019-04-15 RX ORDER — GABAPENTIN 300 MG/1
600 CAPSULE ORAL 3 TIMES DAILY
Qty: 540 CAPSULE | Refills: 0 | Status: SHIPPED | OUTPATIENT
Start: 2019-04-15 | End: 2019-07-08 | Stop reason: SDUPTHER

## 2019-04-15 RX ORDER — TRAMADOL HYDROCHLORIDE 50 MG/1
50 TABLET ORAL DAILY PRN
Qty: 30 TABLET | Refills: 1 | Status: SHIPPED | OUTPATIENT
Start: 2019-04-15 | End: 2019-09-30 | Stop reason: SDUPTHER

## 2019-04-17 ENCOUNTER — APPOINTMENT (OUTPATIENT)
Dept: LAB | Facility: CLINIC | Age: 84
End: 2019-04-17
Payer: MEDICARE

## 2019-04-17 ENCOUNTER — ANTICOAG VISIT (OUTPATIENT)
Dept: CARDIOLOGY CLINIC | Facility: CLINIC | Age: 84
End: 2019-04-17

## 2019-04-17 DIAGNOSIS — Z95.2 HISTORY OF MITRAL VALVE REPLACEMENT: ICD-10-CM

## 2019-04-17 DIAGNOSIS — Z79.891 LONG-TERM CURRENT USE OF OPIATE ANALGESIC: ICD-10-CM

## 2019-04-22 ENCOUNTER — EVALUATION (OUTPATIENT)
Dept: PHYSICAL THERAPY | Facility: REHABILITATION | Age: 84
End: 2019-04-22
Payer: MEDICARE

## 2019-04-22 DIAGNOSIS — G89.29 CHRONIC BILATERAL LOW BACK PAIN WITH BILATERAL SCIATICA: ICD-10-CM

## 2019-04-22 DIAGNOSIS — M54.42 CHRONIC BILATERAL LOW BACK PAIN WITH BILATERAL SCIATICA: ICD-10-CM

## 2019-04-22 DIAGNOSIS — M54.41 CHRONIC BILATERAL LOW BACK PAIN WITH BILATERAL SCIATICA: ICD-10-CM

## 2019-04-22 DIAGNOSIS — M46.1 SACROILIITIS (HCC): ICD-10-CM

## 2019-04-22 DIAGNOSIS — M54.16 CHRONIC LUMBAR RADICULOPATHY: ICD-10-CM

## 2019-04-22 PROCEDURE — 97162 PT EVAL MOD COMPLEX 30 MIN: CPT | Performed by: PHYSICAL THERAPIST

## 2019-04-24 ENCOUNTER — OFFICE VISIT (OUTPATIENT)
Dept: PHYSICAL THERAPY | Facility: REHABILITATION | Age: 84
End: 2019-04-24
Payer: MEDICARE

## 2019-04-24 DIAGNOSIS — M54.42 CHRONIC BILATERAL LOW BACK PAIN WITH BILATERAL SCIATICA: ICD-10-CM

## 2019-04-24 DIAGNOSIS — M54.41 CHRONIC BILATERAL LOW BACK PAIN WITH BILATERAL SCIATICA: ICD-10-CM

## 2019-04-24 DIAGNOSIS — M54.16 CHRONIC LUMBAR RADICULOPATHY: Primary | ICD-10-CM

## 2019-04-24 DIAGNOSIS — M46.1 SACROILIITIS (HCC): ICD-10-CM

## 2019-04-24 DIAGNOSIS — G89.29 CHRONIC BILATERAL LOW BACK PAIN WITH BILATERAL SCIATICA: ICD-10-CM

## 2019-04-24 PROCEDURE — 97112 NEUROMUSCULAR REEDUCATION: CPT

## 2019-04-24 PROCEDURE — 97110 THERAPEUTIC EXERCISES: CPT

## 2019-04-29 ENCOUNTER — OFFICE VISIT (OUTPATIENT)
Dept: PHYSICAL THERAPY | Facility: REHABILITATION | Age: 84
End: 2019-04-29
Payer: MEDICARE

## 2019-04-29 DIAGNOSIS — M46.1 SACROILIITIS (HCC): ICD-10-CM

## 2019-04-29 DIAGNOSIS — G89.29 CHRONIC BILATERAL LOW BACK PAIN WITH BILATERAL SCIATICA: ICD-10-CM

## 2019-04-29 DIAGNOSIS — M54.42 CHRONIC BILATERAL LOW BACK PAIN WITH BILATERAL SCIATICA: ICD-10-CM

## 2019-04-29 DIAGNOSIS — M54.16 CHRONIC LUMBAR RADICULOPATHY: Primary | ICD-10-CM

## 2019-04-29 DIAGNOSIS — M54.41 CHRONIC BILATERAL LOW BACK PAIN WITH BILATERAL SCIATICA: ICD-10-CM

## 2019-04-29 PROCEDURE — 97110 THERAPEUTIC EXERCISES: CPT

## 2019-04-29 PROCEDURE — 97112 NEUROMUSCULAR REEDUCATION: CPT

## 2019-05-01 ENCOUNTER — OFFICE VISIT (OUTPATIENT)
Dept: PHYSICAL THERAPY | Facility: REHABILITATION | Age: 84
End: 2019-05-01
Payer: MEDICARE

## 2019-05-01 ENCOUNTER — ANTICOAG VISIT (OUTPATIENT)
Dept: CARDIOLOGY CLINIC | Facility: CLINIC | Age: 84
End: 2019-05-01

## 2019-05-01 ENCOUNTER — APPOINTMENT (OUTPATIENT)
Dept: LAB | Facility: CLINIC | Age: 84
End: 2019-05-01
Payer: MEDICARE

## 2019-05-01 DIAGNOSIS — M46.1 SACROILIITIS (HCC): ICD-10-CM

## 2019-05-01 DIAGNOSIS — M54.42 CHRONIC BILATERAL LOW BACK PAIN WITH BILATERAL SCIATICA: ICD-10-CM

## 2019-05-01 DIAGNOSIS — M54.16 CHRONIC LUMBAR RADICULOPATHY: Primary | ICD-10-CM

## 2019-05-01 DIAGNOSIS — Z95.2 HISTORY OF MITRAL VALVE REPLACEMENT: ICD-10-CM

## 2019-05-01 DIAGNOSIS — Z79.891 LONG-TERM CURRENT USE OF OPIATE ANALGESIC: ICD-10-CM

## 2019-05-01 DIAGNOSIS — G89.29 CHRONIC BILATERAL LOW BACK PAIN WITH BILATERAL SCIATICA: ICD-10-CM

## 2019-05-01 DIAGNOSIS — I48.91 ATRIAL FIBRILLATION, UNSPECIFIED TYPE (HCC): ICD-10-CM

## 2019-05-01 DIAGNOSIS — M54.41 CHRONIC BILATERAL LOW BACK PAIN WITH BILATERAL SCIATICA: ICD-10-CM

## 2019-05-01 PROCEDURE — 97110 THERAPEUTIC EXERCISES: CPT | Performed by: PHYSICAL THERAPIST

## 2019-05-06 ENCOUNTER — OFFICE VISIT (OUTPATIENT)
Dept: PHYSICAL THERAPY | Facility: REHABILITATION | Age: 84
End: 2019-05-06
Payer: MEDICARE

## 2019-05-06 DIAGNOSIS — M54.42 CHRONIC BILATERAL LOW BACK PAIN WITH BILATERAL SCIATICA: ICD-10-CM

## 2019-05-06 DIAGNOSIS — M46.1 SACROILIITIS (HCC): ICD-10-CM

## 2019-05-06 DIAGNOSIS — M54.16 CHRONIC LUMBAR RADICULOPATHY: Primary | ICD-10-CM

## 2019-05-06 DIAGNOSIS — M54.41 CHRONIC BILATERAL LOW BACK PAIN WITH BILATERAL SCIATICA: ICD-10-CM

## 2019-05-06 DIAGNOSIS — G89.29 CHRONIC BILATERAL LOW BACK PAIN WITH BILATERAL SCIATICA: ICD-10-CM

## 2019-05-06 PROCEDURE — 97110 THERAPEUTIC EXERCISES: CPT

## 2019-05-06 PROCEDURE — 97112 NEUROMUSCULAR REEDUCATION: CPT

## 2019-05-08 ENCOUNTER — OFFICE VISIT (OUTPATIENT)
Dept: PHYSICAL THERAPY | Facility: REHABILITATION | Age: 84
End: 2019-05-08
Payer: MEDICARE

## 2019-05-08 DIAGNOSIS — M54.41 CHRONIC BILATERAL LOW BACK PAIN WITH BILATERAL SCIATICA: ICD-10-CM

## 2019-05-08 DIAGNOSIS — G89.29 CHRONIC BILATERAL LOW BACK PAIN WITH BILATERAL SCIATICA: ICD-10-CM

## 2019-05-08 DIAGNOSIS — M46.1 SACROILIITIS (HCC): ICD-10-CM

## 2019-05-08 DIAGNOSIS — M54.42 CHRONIC BILATERAL LOW BACK PAIN WITH BILATERAL SCIATICA: ICD-10-CM

## 2019-05-08 DIAGNOSIS — M54.16 CHRONIC LUMBAR RADICULOPATHY: Primary | ICD-10-CM

## 2019-05-08 PROCEDURE — 97110 THERAPEUTIC EXERCISES: CPT | Performed by: PHYSICAL THERAPIST

## 2019-05-08 PROCEDURE — 97112 NEUROMUSCULAR REEDUCATION: CPT | Performed by: PHYSICAL THERAPIST

## 2019-05-13 ENCOUNTER — OFFICE VISIT (OUTPATIENT)
Dept: PHYSICAL THERAPY | Facility: REHABILITATION | Age: 84
End: 2019-05-13
Payer: MEDICARE

## 2019-05-13 DIAGNOSIS — M54.41 CHRONIC BILATERAL LOW BACK PAIN WITH BILATERAL SCIATICA: ICD-10-CM

## 2019-05-13 DIAGNOSIS — M54.16 CHRONIC LUMBAR RADICULOPATHY: Primary | ICD-10-CM

## 2019-05-13 DIAGNOSIS — G89.29 CHRONIC BILATERAL LOW BACK PAIN WITH BILATERAL SCIATICA: ICD-10-CM

## 2019-05-13 DIAGNOSIS — M46.1 SACROILIITIS (HCC): ICD-10-CM

## 2019-05-13 DIAGNOSIS — M54.42 CHRONIC BILATERAL LOW BACK PAIN WITH BILATERAL SCIATICA: ICD-10-CM

## 2019-05-13 PROCEDURE — 97110 THERAPEUTIC EXERCISES: CPT

## 2019-05-13 PROCEDURE — 97112 NEUROMUSCULAR REEDUCATION: CPT

## 2019-05-15 ENCOUNTER — OFFICE VISIT (OUTPATIENT)
Dept: PHYSICAL THERAPY | Facility: REHABILITATION | Age: 84
End: 2019-05-15
Payer: MEDICARE

## 2019-05-15 DIAGNOSIS — M46.1 SACROILIITIS (HCC): ICD-10-CM

## 2019-05-15 DIAGNOSIS — M54.16 CHRONIC LUMBAR RADICULOPATHY: Primary | ICD-10-CM

## 2019-05-15 DIAGNOSIS — G89.29 CHRONIC BILATERAL LOW BACK PAIN WITH BILATERAL SCIATICA: ICD-10-CM

## 2019-05-15 DIAGNOSIS — M54.41 CHRONIC BILATERAL LOW BACK PAIN WITH BILATERAL SCIATICA: ICD-10-CM

## 2019-05-15 DIAGNOSIS — M54.42 CHRONIC BILATERAL LOW BACK PAIN WITH BILATERAL SCIATICA: ICD-10-CM

## 2019-05-15 PROCEDURE — 97110 THERAPEUTIC EXERCISES: CPT | Performed by: PHYSICAL THERAPIST

## 2019-05-20 ENCOUNTER — EVALUATION (OUTPATIENT)
Dept: PHYSICAL THERAPY | Facility: REHABILITATION | Age: 84
End: 2019-05-20
Payer: MEDICARE

## 2019-05-20 DIAGNOSIS — M46.1 SACROILIITIS (HCC): ICD-10-CM

## 2019-05-20 DIAGNOSIS — M54.16 CHRONIC LUMBAR RADICULOPATHY: Primary | ICD-10-CM

## 2019-05-20 DIAGNOSIS — M54.41 CHRONIC BILATERAL LOW BACK PAIN WITH BILATERAL SCIATICA: ICD-10-CM

## 2019-05-20 DIAGNOSIS — M54.42 CHRONIC BILATERAL LOW BACK PAIN WITH BILATERAL SCIATICA: ICD-10-CM

## 2019-05-20 DIAGNOSIS — G89.29 CHRONIC BILATERAL LOW BACK PAIN WITH BILATERAL SCIATICA: ICD-10-CM

## 2019-05-20 PROCEDURE — 97110 THERAPEUTIC EXERCISES: CPT | Performed by: PHYSICAL THERAPIST

## 2019-05-20 PROCEDURE — 97140 MANUAL THERAPY 1/> REGIONS: CPT | Performed by: PHYSICAL THERAPIST

## 2019-05-22 ENCOUNTER — ANTICOAG VISIT (OUTPATIENT)
Dept: CARDIOLOGY CLINIC | Facility: CLINIC | Age: 84
End: 2019-05-22

## 2019-05-22 ENCOUNTER — APPOINTMENT (OUTPATIENT)
Dept: LAB | Facility: CLINIC | Age: 84
End: 2019-05-22
Payer: MEDICARE

## 2019-05-22 ENCOUNTER — OFFICE VISIT (OUTPATIENT)
Dept: PHYSICAL THERAPY | Facility: REHABILITATION | Age: 84
End: 2019-05-22
Payer: MEDICARE

## 2019-05-22 ENCOUNTER — TRANSCRIBE ORDERS (OUTPATIENT)
Dept: LAB | Facility: CLINIC | Age: 84
End: 2019-05-22

## 2019-05-22 DIAGNOSIS — G89.29 CHRONIC BILATERAL LOW BACK PAIN WITH BILATERAL SCIATICA: ICD-10-CM

## 2019-05-22 DIAGNOSIS — M54.16 CHRONIC LUMBAR RADICULOPATHY: Primary | ICD-10-CM

## 2019-05-22 DIAGNOSIS — M54.41 CHRONIC BILATERAL LOW BACK PAIN WITH BILATERAL SCIATICA: ICD-10-CM

## 2019-05-22 DIAGNOSIS — Z95.2 HISTORY OF MITRAL VALVE REPLACEMENT: ICD-10-CM

## 2019-05-22 DIAGNOSIS — Z79.891 LONG-TERM CURRENT USE OF OPIATE ANALGESIC: ICD-10-CM

## 2019-05-22 DIAGNOSIS — M54.42 CHRONIC BILATERAL LOW BACK PAIN WITH BILATERAL SCIATICA: ICD-10-CM

## 2019-05-22 DIAGNOSIS — M46.1 SACROILIITIS (HCC): ICD-10-CM

## 2019-05-22 PROCEDURE — 97110 THERAPEUTIC EXERCISES: CPT

## 2019-05-29 ENCOUNTER — OFFICE VISIT (OUTPATIENT)
Dept: PHYSICAL THERAPY | Facility: REHABILITATION | Age: 84
End: 2019-05-29
Payer: MEDICARE

## 2019-05-29 DIAGNOSIS — G89.29 CHRONIC BILATERAL LOW BACK PAIN WITH BILATERAL SCIATICA: ICD-10-CM

## 2019-05-29 DIAGNOSIS — M54.41 CHRONIC BILATERAL LOW BACK PAIN WITH BILATERAL SCIATICA: ICD-10-CM

## 2019-05-29 DIAGNOSIS — M46.1 SACROILIITIS (HCC): ICD-10-CM

## 2019-05-29 DIAGNOSIS — M54.16 CHRONIC LUMBAR RADICULOPATHY: Primary | ICD-10-CM

## 2019-05-29 DIAGNOSIS — M54.42 CHRONIC BILATERAL LOW BACK PAIN WITH BILATERAL SCIATICA: ICD-10-CM

## 2019-05-29 PROCEDURE — 97110 THERAPEUTIC EXERCISES: CPT | Performed by: PHYSICAL THERAPIST

## 2019-05-31 ENCOUNTER — OFFICE VISIT (OUTPATIENT)
Dept: PHYSICAL THERAPY | Facility: REHABILITATION | Age: 84
End: 2019-05-31
Payer: MEDICARE

## 2019-05-31 DIAGNOSIS — M46.1 SACROILIITIS (HCC): ICD-10-CM

## 2019-05-31 DIAGNOSIS — M54.16 CHRONIC LUMBAR RADICULOPATHY: Primary | ICD-10-CM

## 2019-05-31 DIAGNOSIS — M54.41 CHRONIC BILATERAL LOW BACK PAIN WITH BILATERAL SCIATICA: ICD-10-CM

## 2019-05-31 DIAGNOSIS — M54.42 CHRONIC BILATERAL LOW BACK PAIN WITH BILATERAL SCIATICA: ICD-10-CM

## 2019-05-31 DIAGNOSIS — G89.29 CHRONIC BILATERAL LOW BACK PAIN WITH BILATERAL SCIATICA: ICD-10-CM

## 2019-05-31 PROCEDURE — 97112 NEUROMUSCULAR REEDUCATION: CPT

## 2019-05-31 PROCEDURE — 97110 THERAPEUTIC EXERCISES: CPT

## 2019-06-03 ENCOUNTER — OFFICE VISIT (OUTPATIENT)
Dept: PHYSICAL THERAPY | Facility: REHABILITATION | Age: 84
End: 2019-06-03
Payer: MEDICARE

## 2019-06-03 DIAGNOSIS — M54.41 CHRONIC BILATERAL LOW BACK PAIN WITH BILATERAL SCIATICA: ICD-10-CM

## 2019-06-03 DIAGNOSIS — M46.1 SACROILIITIS (HCC): ICD-10-CM

## 2019-06-03 DIAGNOSIS — G89.29 CHRONIC BILATERAL LOW BACK PAIN WITH BILATERAL SCIATICA: ICD-10-CM

## 2019-06-03 DIAGNOSIS — M54.42 CHRONIC BILATERAL LOW BACK PAIN WITH BILATERAL SCIATICA: ICD-10-CM

## 2019-06-03 DIAGNOSIS — M54.16 CHRONIC LUMBAR RADICULOPATHY: Primary | ICD-10-CM

## 2019-06-03 PROCEDURE — 97110 THERAPEUTIC EXERCISES: CPT

## 2019-06-03 PROCEDURE — 97112 NEUROMUSCULAR REEDUCATION: CPT

## 2019-06-05 ENCOUNTER — APPOINTMENT (OUTPATIENT)
Dept: LAB | Facility: CLINIC | Age: 84
End: 2019-06-05
Payer: MEDICARE

## 2019-06-05 ENCOUNTER — ANTICOAG VISIT (OUTPATIENT)
Dept: CARDIOLOGY CLINIC | Facility: CLINIC | Age: 84
End: 2019-06-05

## 2019-06-05 ENCOUNTER — OFFICE VISIT (OUTPATIENT)
Dept: PHYSICAL THERAPY | Facility: REHABILITATION | Age: 84
End: 2019-06-05
Payer: MEDICARE

## 2019-06-05 DIAGNOSIS — G89.29 CHRONIC BILATERAL LOW BACK PAIN WITH BILATERAL SCIATICA: ICD-10-CM

## 2019-06-05 DIAGNOSIS — M54.41 CHRONIC BILATERAL LOW BACK PAIN WITH BILATERAL SCIATICA: ICD-10-CM

## 2019-06-05 DIAGNOSIS — Z95.2 HISTORY OF MITRAL VALVE REPLACEMENT: ICD-10-CM

## 2019-06-05 DIAGNOSIS — M54.42 CHRONIC BILATERAL LOW BACK PAIN WITH BILATERAL SCIATICA: ICD-10-CM

## 2019-06-05 DIAGNOSIS — M54.16 CHRONIC LUMBAR RADICULOPATHY: Primary | ICD-10-CM

## 2019-06-05 DIAGNOSIS — Z79.891 LONG-TERM CURRENT USE OF OPIATE ANALGESIC: ICD-10-CM

## 2019-06-05 DIAGNOSIS — M46.1 SACROILIITIS (HCC): ICD-10-CM

## 2019-06-05 PROCEDURE — 97112 NEUROMUSCULAR REEDUCATION: CPT

## 2019-06-05 PROCEDURE — 97110 THERAPEUTIC EXERCISES: CPT

## 2019-06-10 ENCOUNTER — OFFICE VISIT (OUTPATIENT)
Dept: PHYSICAL THERAPY | Facility: REHABILITATION | Age: 84
End: 2019-06-10
Payer: MEDICARE

## 2019-06-10 DIAGNOSIS — M54.41 CHRONIC BILATERAL LOW BACK PAIN WITH BILATERAL SCIATICA: ICD-10-CM

## 2019-06-10 DIAGNOSIS — G89.29 CHRONIC BILATERAL LOW BACK PAIN WITH BILATERAL SCIATICA: ICD-10-CM

## 2019-06-10 DIAGNOSIS — M54.42 CHRONIC BILATERAL LOW BACK PAIN WITH BILATERAL SCIATICA: ICD-10-CM

## 2019-06-10 DIAGNOSIS — M54.16 CHRONIC LUMBAR RADICULOPATHY: Primary | ICD-10-CM

## 2019-06-10 DIAGNOSIS — M46.1 SACROILIITIS (HCC): ICD-10-CM

## 2019-06-10 PROCEDURE — 97112 NEUROMUSCULAR REEDUCATION: CPT | Performed by: PHYSICAL THERAPIST

## 2019-06-10 PROCEDURE — 97110 THERAPEUTIC EXERCISES: CPT | Performed by: PHYSICAL THERAPIST

## 2019-06-12 ENCOUNTER — EVALUATION (OUTPATIENT)
Dept: PHYSICAL THERAPY | Facility: REHABILITATION | Age: 84
End: 2019-06-12
Payer: MEDICARE

## 2019-06-12 DIAGNOSIS — G89.29 CHRONIC BILATERAL LOW BACK PAIN WITH BILATERAL SCIATICA: ICD-10-CM

## 2019-06-12 DIAGNOSIS — M46.1 SACROILIITIS (HCC): ICD-10-CM

## 2019-06-12 DIAGNOSIS — M54.41 CHRONIC BILATERAL LOW BACK PAIN WITH BILATERAL SCIATICA: ICD-10-CM

## 2019-06-12 DIAGNOSIS — M54.16 CHRONIC LUMBAR RADICULOPATHY: Primary | ICD-10-CM

## 2019-06-12 DIAGNOSIS — M54.42 CHRONIC BILATERAL LOW BACK PAIN WITH BILATERAL SCIATICA: ICD-10-CM

## 2019-06-12 PROCEDURE — 97110 THERAPEUTIC EXERCISES: CPT | Performed by: PHYSICAL THERAPIST

## 2019-06-12 PROCEDURE — 97140 MANUAL THERAPY 1/> REGIONS: CPT | Performed by: PHYSICAL THERAPIST

## 2019-06-26 ENCOUNTER — TRANSCRIBE ORDERS (OUTPATIENT)
Dept: LAB | Facility: CLINIC | Age: 84
End: 2019-06-26

## 2019-06-26 ENCOUNTER — APPOINTMENT (OUTPATIENT)
Dept: LAB | Facility: CLINIC | Age: 84
End: 2019-06-26
Payer: MEDICARE

## 2019-06-27 ENCOUNTER — ANTICOAG VISIT (OUTPATIENT)
Dept: CARDIOLOGY CLINIC | Facility: CLINIC | Age: 84
End: 2019-06-27

## 2019-06-27 DIAGNOSIS — Z79.891 LONG-TERM CURRENT USE OF OPIATE ANALGESIC: ICD-10-CM

## 2019-06-27 DIAGNOSIS — I48.91 ATRIAL FIBRILLATION, UNSPECIFIED TYPE (HCC): ICD-10-CM

## 2019-06-27 DIAGNOSIS — Z95.2 HISTORY OF MITRAL VALVE REPLACEMENT: ICD-10-CM

## 2019-07-08 ENCOUNTER — OFFICE VISIT (OUTPATIENT)
Dept: PAIN MEDICINE | Facility: MEDICAL CENTER | Age: 84
End: 2019-07-08
Payer: MEDICARE

## 2019-07-08 ENCOUNTER — TELEPHONE (OUTPATIENT)
Dept: PAIN MEDICINE | Facility: MEDICAL CENTER | Age: 84
End: 2019-07-08

## 2019-07-08 VITALS
BODY MASS INDEX: 28.07 KG/M2 | WEIGHT: 164.4 LBS | HEIGHT: 64 IN | DIASTOLIC BLOOD PRESSURE: 50 MMHG | SYSTOLIC BLOOD PRESSURE: 118 MMHG

## 2019-07-08 DIAGNOSIS — M54.16 CHRONIC LUMBAR RADICULOPATHY: Primary | ICD-10-CM

## 2019-07-08 DIAGNOSIS — F11.20 UNCOMPLICATED OPIOID DEPENDENCE (HCC): ICD-10-CM

## 2019-07-08 DIAGNOSIS — Z79.891 LONG-TERM CURRENT USE OF OPIATE ANALGESIC: ICD-10-CM

## 2019-07-08 DIAGNOSIS — M79.18 MYOFASCIAL PAIN SYNDROME: ICD-10-CM

## 2019-07-08 DIAGNOSIS — M46.1 SACROILIITIS (HCC): ICD-10-CM

## 2019-07-08 DIAGNOSIS — G89.4 CHRONIC PAIN SYNDROME: ICD-10-CM

## 2019-07-08 PROCEDURE — 99214 OFFICE O/P EST MOD 30 MIN: CPT | Performed by: NURSE PRACTITIONER

## 2019-07-08 PROCEDURE — 80305 DRUG TEST PRSMV DIR OPT OBS: CPT | Performed by: NURSE PRACTITIONER

## 2019-07-08 RX ORDER — GABAPENTIN 300 MG/1
600 CAPSULE ORAL 3 TIMES DAILY
Qty: 540 CAPSULE | Refills: 0 | Status: SHIPPED | OUTPATIENT
Start: 2019-07-08 | End: 2020-07-06 | Stop reason: SDUPTHER

## 2019-07-08 NOTE — TELEPHONE ENCOUNTER
Janet Gallegos (Pharmacist) advised that [facility] said it is okay to fill tramadol prescription with 30 day supply  SW patient and made aware prescription is going to be filled with a 30 day supply  Patient appreciative of call back

## 2019-07-08 NOTE — PROGRESS NOTES
Assessment  1  Chronic lumbar radiculopathy    2  Myofascial pain syndrome    3  Sacroiliitis ([address] )    4  Chronic pain syndrome    5  Long-term current use of opiate analgesic        Plan  Continue with tramadol the patient does not require a refill today she tells me that she never picked up her refills from April yet  She does plan to go to her pharmacy today and fill the prescription  She takes the medication so rarely that she has not needed it  She tells me that in the last month she took 2 tablets of her tramadol  She only uses the medication if she know she is going to be going out for the day or she is going to be doing a lot of housework  Continue with gabapentin a refill was sent to her mail order    Continue home exercise program    Follow-up visit in 12 weeks for medication refills      There are risks associated with opioid medications, including dependence, addiction and tolerance  The patient understands and agrees to use these medications only as prescribed  Potential side effects of the medications include, but are not limited to, constipation, drowsiness, addiction, impaired judgment and risk of fatal overdose if not taken as prescribed  The patient was warned against driving while taking sedation medications  Sharing medications is a felony  At this point in time, the patient is showing no signs of addiction, abuse, diversion or suicidal ideation  A urine drug screen was collected at today's office visit as part of our medication management protocol  The point of care testing results were appropriate for what was being prescribed  The specimen will be sent for confirmatory testing  The drug screen is medically necessary because the patient is either dependent on opioid medication or is being considered for opioid medication therapy and the results could impact ongoing or future treatment   The drug screen is to evaluate for the presences or absence of prescribed, non-prescribed, and/or illicit drugs/substances  [place] Prescription Drug Monitoring Program report was reviewed and was appropriate         My impressions and treatment recommendations were discussed in detail with the patient who verbalized understanding and had no further questions  Discharge instructions were provided  I personally saw and examined the patient and I agree with the above discussed plan of care  No orders of the defined types were placed in this encounter  New Medications Ordered This Visit   Medications    gabapentin (NEURONTIN) 300 mg capsule     Sig: Take 2 capsules (600 mg total) by mouth 3 (three) times a day for 90 days 6 tablets daily     Dispense:  540 capsule     Refill:  0       History of Present Illness    Stevo Neumann is a 80 y o  female presents for follow-up related to her low back and leg pain  Today she rates her pain 5/10 this is most bothersome in the morning and described as dull, aching, and pressure-like  Patient does take tramadol 50 milligrams only on a very as needed basis  She also uses gabapentin 300 milligrams 2 tablets 3 times daily her medications provide 50 percent relief with constipation as her only side effect  Patient did participate and complete physical therapy she reports that this did go well for her  She is now doing her home exercise program that they taught her  I have personally reviewed and/or updated the patient's past medical history, past surgical history, family history, social history, current medications, allergies, and vital signs today  Review of Systems   Gastrointestinal: Positive for constipation  Musculoskeletal: Positive for gait problem, joint swelling and myalgias  All other systems reviewed and are negative        Patient Active Problem List   Diagnosis    Arthritis    Atrial fibrillation ([facility] [address]    Chronic congestive heart failure (HCC)    Chronic lumbar radiculopathy    Coagulation defect ([facility] [address]    Hemorrhoids    History of mitral valve replacement    Hyperlipidemia    Hypertension    Myofascial pain syndrome    Osteopenia    Chronic pain syndrome    Sacroiliitis (HCC)    Type 2 diabetes mellitus ([facility] [address]    Vitamin D deficiency    Long-term current use of opiate analgesic       Past Medical History:   Diagnosis Date    Arthritis     Benign polyp of large intestine     Chronic pain disorder     bilateral back - last assessed 12/27/17    Diabetes mellitus ([facility] [address]     Endocarditis     Hypertension     Hypertension     Spinal stenosis     Stroke syndrome 1985       Past Surgical History:   Procedure Laterality Date    BACK SURGERY      CARPAL TUNNEL RELEASE Left     CATARACT EXTRACTION, BILATERAL  2011    CHOLECYSTECTOMY      FOOT SURGERY Right     GALLBLADDER SURGERY      LUMBAR LAMINECTOMY Right 11/08/2012    L3-4 min   invasive far lateral microdiscectomy for decompression of nerve root, transforaminal epidural steroid injection; operating room microscope for microdissection    MITRAL VALVE REPLACEMENT      biolical valve failed [address] [facility]  [place] [address] 3/17/2017    Procedure: REPAIR HERNIA UMBILICAL;  Surgeon: Con Sánchez DO;  [facility];  Service:     VALVE REPLACEMENT         Family History   Problem Relation Age of Onset    Clotting disorder Mother         disorder of blood and blood forming organs    Heart attack Father     Cancer Maternal Grandfather         doesnt know age   [facility] Heart disease Paternal Grandfather     Diabetes Family        Social History     Occupational History    Occupation: Retired   Tobacco Use    Smoking status: Never Smoker    Smokeless tobacco: Never Used   Substance and Sexual Activity    Alcohol use: No    Drug use: No    Sexual activity: Not on file       Current Outpatient Medications on File Prior to Visit   Medication Sig    atenolol (TENORMIN) 50 mg tablet TAKE 1 TABLET EVERY DAY    losartan-hydrochlorothiazide (HYZAAR) 100-12 5 MG per tablet TAKE 1 TABLET EVERY DAY    metFORMIN (GLUCOPHAGE-XR) 500 mg 24 hr tablet TAKE 1 TABLET EVERY DAY    pravastatin (PRAVACHOL) 40 mg tablet TAKE 1 TABLET EVERY DAY    traMADol (ULTRAM) 50 mg tablet Take 1 tablet (50 mg total) by mouth daily as needed for moderate pain    warfarin (COUMADIN) 5 mg tablet TAKE 1 TABLET (5 MG TOTAL) BY MOUTH DAILY    [DISCONTINUED] gabapentin (NEURONTIN) 300 mg capsule Take 2 capsules (600 mg total) by mouth 3 (three) times a day for 90 days 6 tablets daily     No current facility-administered medications on file prior to visit  Allergies   Allergen Reactions    Lisinopril      cough       Physical Exam    /50   Ht 5' 4" (1 626 m)   Wt 74 6 kg (164 lb 6 4 oz)   BMI 28 22 kg/m²     Constitutional: normal, well developed, well nourished, alert, in no distress and non-toxic and no overt pain behavior    Eyes: anicteric  HEENT: grossly intact  Neck: supple, symmetric, trachea midline and no masses   Pulmonary:even and unlabored  Cardiovascular:No edema or pitting edema present  Skin:Normal without rashes or lesions and well hydrated  Psychiatric:Mood and affect appropriate  Neurologic:Cranial Nerves II-XII grossly intact  Musculoskeletal:ambulates with cane    Imaging

## 2019-07-08 NOTE — TELEPHONE ENCOUNTER
Yariel Hernández  [phone]  Diane Diana is calling in stating that the patient in to the pharmacy today requesting a refill of her traMADol (ULTRAM) 50 mg  The insurance will only pay for 7 days at the point unless the Dr agrees for her to get the 30 day supply

## 2019-07-08 NOTE — TELEPHONE ENCOUNTER
SW patient, states she went by the pharmacy to have her prescription for tramadol filled and Clarisa Moreno  ( pharmacists) told her she can only give her 7 days of medication due to she has not had it filled since December  Clarisa Moreno said the insurance company wants to make sure its okay for her to have 30 days of medication  Clarisa Moreno said she just needs a verbal from [facility] stating it's okay to fill tramadol with a 30 day supply  Clarisa Moreno does not need a new script sent

## 2019-07-22 ENCOUNTER — OFFICE VISIT (OUTPATIENT)
Dept: URGENT CARE | Age: 84
End: 2019-07-22
Payer: MEDICARE

## 2019-07-22 VITALS
HEIGHT: 64 IN | RESPIRATION RATE: 18 BRPM | SYSTOLIC BLOOD PRESSURE: 152 MMHG | WEIGHT: 160 LBS | BODY MASS INDEX: 27.31 KG/M2 | HEART RATE: 57 BPM | DIASTOLIC BLOOD PRESSURE: 74 MMHG | OXYGEN SATURATION: 97 % | TEMPERATURE: 98 F

## 2019-07-22 DIAGNOSIS — S81.812A LACERATION OF LEFT LOWER LEG, INITIAL ENCOUNTER: Primary | ICD-10-CM

## 2019-07-22 PROCEDURE — 90471 IMMUNIZATION ADMIN: CPT | Performed by: FAMILY MEDICINE

## 2019-07-22 PROCEDURE — 99212 OFFICE O/P EST SF 10 MIN: CPT | Performed by: FAMILY MEDICINE

## 2019-07-22 PROCEDURE — 90715 TDAP VACCINE 7 YRS/> IM: CPT

## 2019-07-22 PROCEDURE — G0463 HOSPITAL OUTPT CLINIC VISIT: HCPCS | Performed by: FAMILY MEDICINE

## 2019-07-22 NOTE — PROGRESS NOTES
[facility]        NAME: Rosy Moralez is a 80 y o  female  : 1934    MRN: [id number]  DATE: 2019  TIME: 9:20 AM    Assessment and Plan   Laceration of left lower leg, initial encounter [S81 982A]  1  Laceration of left lower leg, initial encounter  TDAP Vaccine greater than or equal to 8yo         Patient Instructions     Patient Instructions   Rest, elevate left leg, limit activity  Antibiotic ointment and clean dressing to area  Tylenol as needed  Recheck/follow-up with family physician or [address] [place] [address]   [phone]    Please go to the hospital emergency department if needed  Follow up with PCP in 2-3 days  Proceed to  ER if symptoms worsen  Chief Complaint     Chief Complaint   Patient presents with    Leg Injury     left lower hit against end of bed, with cut to leg   @2130, tetanus =>10yrs         History of Present Illness       Patient states she hit her left lower leg against the bed last evening; tear laceration present anterior aspect of the distal left lower leg area; patient is on warfarin; patient needs updating of her tetanus immunization status      Review of Systems   Review of Systems   Skin:        Tear laceration present anterior aspect of the distal left lower leg area   All other systems reviewed and are negative          Current Medications       Current Outpatient Medications:     atenolol (TENORMIN) 50 mg tablet, TAKE 1 TABLET EVERY DAY, Disp: 90 tablet, Rfl: 3    gabapentin (NEURONTIN) 300 mg capsule, Take 2 capsules (600 mg total) by mouth 3 (three) times a day for 90 days 6 tablets daily, Disp: 540 capsule, Rfl: 0    losartan-hydrochlorothiazide (HYZAAR) 100-12 5 MG per tablet, TAKE 1 TABLET EVERY DAY, Disp: 90 tablet, Rfl: 3    metFORMIN (GLUCOPHAGE-XR) 500 mg 24 hr tablet, TAKE 1 TABLET EVERY DAY, Disp: 90 tablet, Rfl: 3    pravastatin (PRAVACHOL) 40 mg tablet, TAKE 1 TABLET EVERY DAY, Disp: 90 tablet, Rfl: 3    traMADol (ULTRAM) 50 mg tablet, Take 1 tablet (50 mg total) by mouth daily as needed for moderate pain, Disp: 30 tablet, Rfl: 1    warfarin (COUMADIN) 5 mg tablet, TAKE 1 TABLET (5 MG TOTAL) BY MOUTH DAILY, Disp: 90 tablet, Rfl: 3    Current Allergies     Allergies as of 07/22/2019 - Reviewed 07/22/2019   Allergen Reaction Noted    Lisinopril  03/13/2017            The following portions of the patient's history were reviewed and updated as appropriate: allergies, current medications, past family history, past medical history, past social history, past surgical history and problem list      Past Medical History:   Diagnosis Date    Arthritis     Benign polyp of large intestine     Chronic pain disorder     bilateral back - last assessed 12/27/17    Diabetes mellitus ([facility] [address]     Endocarditis     Hypertension     Hypertension     Spinal stenosis     Stroke syndrome 1985       Past Surgical History:   Procedure Laterality Date    BACK SURGERY      CARPAL TUNNEL RELEASE Left     CATARACT EXTRACTION, BILATERAL  2011    CHOLECYSTECTOMY      FOOT SURGERY Right     GALLBLADDER SURGERY      LUMBAR LAMINECTOMY Right 11/08/2012    L3-4 min  invasive far lateral microdiscectomy for decompression of nerve root, transforaminal epidural steroid injection; operating room microscope for microdissection    MITRAL VALVE REPLACEMENT      biolical valve failed [address] [facility] N/A 3/17/2017    Procedure: REPAIR HERNIA UMBILICAL;  Surgeon: Damian Rice DO;  [facility];  Service:     VALVE REPLACEMENT         Family History   Problem Relation Age of Onset    Clotting disorder Mother         disorder of blood and blood forming organs    Heart attack Father     Cancer Maternal Grandfather         doesnt know age   Learta January Heart disease Paternal Grandfather     Diabetes Family          Medications have been verified          Objective   /74   Pulse 57 Temp 98 °F (36 7 °C)   Resp 18   Ht 5' 4" (1 626 m)   Wt 72 6 kg (160 lb)   SpO2 97%   BMI 27 46 kg/m²        Physical Exam     Physical Exam   Constitutional: She is oriented to person, place, and time  She appears well-nourished  Neurological: She is alert and oriented to person, place, and time  Skin:   Approximately 3 cm tear laceration anterior aspect of the distal left lower leg area (no sutures needed); area cleaned by the nurse, antibiotic ointment and pressure dressing applied to the area by the nurse   Psychiatric: She has a normal mood and affect  Her behavior is normal    Nursing note and vitals reviewed          Tdap given to the patient by the nurse

## 2019-07-22 NOTE — PATIENT INSTRUCTIONS
Rest, elevate left leg, limit activity  Antibiotic ointment and clean dressing to area  Tylenol as needed  Recheck/follow-up with family physician or [address] [place] [address]  Octavio Ervin   [phone]    Please go to the hospital emergency department if needed

## 2019-07-25 ENCOUNTER — APPOINTMENT (OUTPATIENT)
Dept: LAB | Facility: CLINIC | Age: 84
End: 2019-07-25
Payer: MEDICARE

## 2019-07-25 ENCOUNTER — ANTICOAG VISIT (OUTPATIENT)
Dept: CARDIOLOGY CLINIC | Facility: CLINIC | Age: 84
End: 2019-07-25

## 2019-07-25 DIAGNOSIS — Z79.891 LONG-TERM CURRENT USE OF OPIATE ANALGESIC: ICD-10-CM

## 2019-07-25 DIAGNOSIS — Z95.2 HISTORY OF MITRAL VALVE REPLACEMENT: ICD-10-CM

## 2019-07-25 DIAGNOSIS — E11.9 TYPE 2 DIABETES MELLITUS WITHOUT COMPLICATION, WITHOUT LONG-TERM CURRENT USE OF INSULIN (HCC): ICD-10-CM

## 2019-07-25 DIAGNOSIS — I10 ESSENTIAL HYPERTENSION: ICD-10-CM

## 2019-07-25 DIAGNOSIS — I50.9 CHRONIC CONGESTIVE HEART FAILURE, UNSPECIFIED HEART FAILURE TYPE (HCC): ICD-10-CM

## 2019-07-25 DIAGNOSIS — M46.1 SACROILIITIS (HCC): ICD-10-CM

## 2019-07-25 LAB
ALBUMIN SERPL BCP-MCNC: 3.6 G/DL (ref 3.5–5)
ALP SERPL-CCNC: 50 U/L (ref 46–116)
ALT SERPL W P-5'-P-CCNC: 29 U/L (ref 12–78)
ANION GAP SERPL CALCULATED.3IONS-SCNC: 3 MMOL/L (ref 4–13)
AST SERPL W P-5'-P-CCNC: 16 U/L (ref 5–45)
BASOPHILS # BLD AUTO: 0.04 THOUSANDS/ΜL (ref 0–0.1)
BASOPHILS NFR BLD AUTO: 1 % (ref 0–1)
BILIRUB SERPL-MCNC: 0.64 MG/DL (ref 0.2–1)
BUN SERPL-MCNC: 22 MG/DL (ref 5–25)
CALCIUM SERPL-MCNC: 9 MG/DL (ref 8.3–10.1)
CHLORIDE SERPL-SCNC: 104 MMOL/L (ref 100–108)
CHOLEST SERPL-MCNC: 139 MG/DL (ref 50–200)
CO2 SERPL-SCNC: 32 MMOL/L (ref 21–32)
CREAT SERPL-MCNC: 0.8 MG/DL (ref 0.6–1.3)
CREAT UR-MCNC: 159 MG/DL
EOSINOPHIL # BLD AUTO: 0.04 THOUSAND/ΜL (ref 0–0.61)
EOSINOPHIL NFR BLD AUTO: 1 % (ref 0–6)
ERYTHROCYTE [DISTWIDTH] IN BLOOD BY AUTOMATED COUNT: 13.1 % (ref 11.6–15.1)
EST. AVERAGE GLUCOSE BLD GHB EST-MCNC: 123 MG/DL
GFR SERPL CREATININE-BSD FRML MDRD: 67 ML/MIN/1.73SQ M
GLUCOSE P FAST SERPL-MCNC: 129 MG/DL (ref 65–99)
HBA1C MFR BLD: 5.9 % (ref 4.2–6.3)
HCT VFR BLD AUTO: 39.2 % (ref 34.8–46.1)
HDLC SERPL-MCNC: 51 MG/DL (ref 40–60)
HGB BLD-MCNC: 12.8 G/DL (ref 11.5–15.4)
IMM GRANULOCYTES # BLD AUTO: 0.02 THOUSAND/UL (ref 0–0.2)
IMM GRANULOCYTES NFR BLD AUTO: 0 % (ref 0–2)
LDLC SERPL CALC-MCNC: 74 MG/DL (ref 0–100)
LYMPHOCYTES # BLD AUTO: 1.03 THOUSANDS/ΜL (ref 0.6–4.47)
LYMPHOCYTES NFR BLD AUTO: 13 % (ref 14–44)
MCH RBC QN AUTO: 30.8 PG (ref 26.8–34.3)
MCHC RBC AUTO-ENTMCNC: 32.7 G/DL (ref 31.4–37.4)
MCV RBC AUTO: 95 FL (ref 82–98)
MICROALBUMIN UR-MCNC: 50.8 MG/L (ref 0–20)
MICROALBUMIN/CREAT 24H UR: 32 MG/G CREATININE (ref 0–30)
MONOCYTES # BLD AUTO: 0.55 THOUSAND/ΜL (ref 0.17–1.22)
MONOCYTES NFR BLD AUTO: 7 % (ref 4–12)
NEUTROPHILS # BLD AUTO: 6.07 THOUSANDS/ΜL (ref 1.85–7.62)
NEUTS SEG NFR BLD AUTO: 78 % (ref 43–75)
NRBC BLD AUTO-RTO: 0 /100 WBCS
PLATELET # BLD AUTO: 217 THOUSANDS/UL (ref 149–390)
PMV BLD AUTO: 11.9 FL (ref 8.9–12.7)
POTASSIUM SERPL-SCNC: 3.5 MMOL/L (ref 3.5–5.3)
PROT SERPL-MCNC: 7 G/DL (ref 6.4–8.2)
RBC # BLD AUTO: 4.15 MILLION/UL (ref 3.81–5.12)
SODIUM SERPL-SCNC: 139 MMOL/L (ref 136–145)
TRIGL SERPL-MCNC: 70 MG/DL
TSH SERPL DL<=0.05 MIU/L-ACNC: 0.52 UIU/ML (ref 0.36–3.74)
WBC # BLD AUTO: 7.75 THOUSAND/UL (ref 4.31–10.16)

## 2019-07-25 PROCEDURE — 80053 COMPREHEN METABOLIC PANEL: CPT

## 2019-07-25 PROCEDURE — 36415 COLL VENOUS BLD VENIPUNCTURE: CPT

## 2019-07-25 PROCEDURE — 82570 ASSAY OF URINE CREATININE: CPT

## 2019-07-25 PROCEDURE — 83036 HEMOGLOBIN GLYCOSYLATED A1C: CPT

## 2019-07-25 PROCEDURE — 82043 UR ALBUMIN QUANTITATIVE: CPT

## 2019-07-25 PROCEDURE — 80061 LIPID PANEL: CPT

## 2019-07-25 PROCEDURE — 84443 ASSAY THYROID STIM HORMONE: CPT

## 2019-07-25 PROCEDURE — 85025 COMPLETE CBC W/AUTO DIFF WBC: CPT

## 2019-07-29 ENCOUNTER — OFFICE VISIT (OUTPATIENT)
Dept: FAMILY MEDICINE CLINIC | Facility: CLINIC | Age: 84
End: 2019-07-29
Payer: MEDICARE

## 2019-07-29 VITALS
SYSTOLIC BLOOD PRESSURE: 122 MMHG | BODY MASS INDEX: 28.1 KG/M2 | WEIGHT: 164.6 LBS | HEART RATE: 62 BPM | HEIGHT: 64 IN | DIASTOLIC BLOOD PRESSURE: 64 MMHG

## 2019-07-29 DIAGNOSIS — I10 ESSENTIAL HYPERTENSION: ICD-10-CM

## 2019-07-29 DIAGNOSIS — R80.9 TYPE 2 DIABETES MELLITUS WITH MICROALBUMINURIA, WITHOUT LONG-TERM CURRENT USE OF INSULIN (HCC): ICD-10-CM

## 2019-07-29 DIAGNOSIS — E78.2 MIXED HYPERLIPIDEMIA: ICD-10-CM

## 2019-07-29 DIAGNOSIS — R80.9 MICROALBUMINURIA: ICD-10-CM

## 2019-07-29 DIAGNOSIS — E11.29 TYPE 2 DIABETES MELLITUS WITH MICROALBUMINURIA, WITHOUT LONG-TERM CURRENT USE OF INSULIN (HCC): ICD-10-CM

## 2019-07-29 DIAGNOSIS — L03.116 CELLULITIS OF LEFT LOWER EXTREMITY: Primary | ICD-10-CM

## 2019-07-29 DIAGNOSIS — I48.91 ATRIAL FIBRILLATION, UNSPECIFIED TYPE (HCC): ICD-10-CM

## 2019-07-29 DIAGNOSIS — E55.9 VITAMIN D DEFICIENCY: ICD-10-CM

## 2019-07-29 DIAGNOSIS — S51.019A SKIN TEAR OF ELBOW WITHOUT COMPLICATION, UNSPECIFIED LATERALITY, INITIAL ENCOUNTER: ICD-10-CM

## 2019-07-29 PROCEDURE — 99214 OFFICE O/P EST MOD 30 MIN: CPT | Performed by: PHYSICIAN ASSISTANT

## 2019-07-29 RX ORDER — CEPHALEXIN 500 MG/1
500 CAPSULE ORAL EVERY 8 HOURS SCHEDULED
Qty: 30 CAPSULE | Refills: 0 | Status: SHIPPED | OUTPATIENT
Start: 2019-07-29 | End: 2019-08-08

## 2019-07-29 NOTE — PROGRESS NOTES
Assessment and Plan:  Patient Instructions   1  Skin tear of the left anterior tibial region with localized cellulitis-recommend starting Keflex 500 mg 3 times daily for 10 days  Encourage patient to continue bandaging once daily as needed  2  Type 2 diabetes-presently stable with hemoglobin A1c of 5 9  Continue metformin therapy  Continue follow-up with Ophthalmology and podiatry  3  Micro albuminuria-stable with microalbumin of 50  Patient is currently on losartan therapy and with good glucose control  4  Hyperlipidemia-stable on  pravastatin  Cholesterol numbers were reviewed  No medication changes  5  Hypertension-presently stable with losartan, hydrochlorothiazide, and atenolol  6  Atrial fibrillation-stable on warfarin therapy long-term  Diagnoses and all orders for this visit:    Cellulitis of left lower extremity  -     cephalexin (KEFLEX) 500 mg capsule; Take 1 capsule (500 mg total) by mouth every 8 (eight) hours for 10 days    Type 2 diabetes mellitus with microalbuminuria, without long-term current use of insulin (HCC)  -     CBC and differential; Future  -     Comprehensive metabolic panel; Future  -     Hemoglobin A1C; Future  -     Lipid Panel with Direct LDL reflex; Future  -     Microalbumin / creatinine urine ratio; Future  -     TSH, 3rd generation; Future  -     Vitamin D 25 hydroxy; Future    Essential hypertension  -     CBC and differential; Future  -     Comprehensive metabolic panel; Future  -     Hemoglobin A1C; Future  -     Lipid Panel with Direct LDL reflex; Future  -     Microalbumin / creatinine urine ratio; Future  -     TSH, 3rd generation; Future  -     Vitamin D 25 hydroxy; Future    Microalbuminuria    Atrial fibrillation, unspecified type (HCC)  -     CBC and differential; Future  -     Comprehensive metabolic panel; Future  -     Hemoglobin A1C; Future  -     Lipid Panel with Direct LDL reflex; Future  -     Microalbumin / creatinine urine ratio;  Future  - TSH, 3rd generation; Future  -     Vitamin D 25 hydroxy; Future    Mixed hyperlipidemia  -     CBC and differential; Future  -     Comprehensive metabolic panel; Future  -     Hemoglobin A1C; Future  -     Lipid Panel with Direct LDL reflex; Future  -     Microalbumin / creatinine urine ratio; Future  -     TSH, 3rd generation; Future  -     Vitamin D 25 hydroxy; Future    Vitamin D deficiency  -     CBC and differential; Future  -     Comprehensive metabolic panel; Future  -     Hemoglobin A1C; Future  -     Lipid Panel with Direct LDL reflex; Future  -     Microalbumin / creatinine urine ratio; Future  -     TSH, 3rd generation; Future  -     Vitamin D 25 hydroxy; Future    Skin tear of elbow without complication, unspecified laterality, initial encounter  -     CBC and differential; Future  -     Comprehensive metabolic panel; Future  -     Hemoglobin A1C; Future  -     Lipid Panel with Direct LDL reflex; Future  -     Microalbumin / creatinine urine ratio; Future  -     TSH, 3rd generation; Future  -     Vitamin D 25 hydroxy; Future              Subjective:      Patient ID: Murphy Larose is a 80 y o  female  CC:    Chief Complaint   Patient presents with    Hypertension     pt also has blood work to review    Abrasion     pt has a skin tear left shin since saturday night~cd       HPI:    HPI:  This is an 79-year-old female who presents to the office for evaluation of skin tear on her left anterior tibial area  This was sustained about 8 days ago when she was at her daughter's house and bumped her leg on the bed frame  She had significant bleeding and was seen the next morning in the urgent care setting  She has been using Neosporin and bandaging the area but she is concerned about some redness and discomfort that is extending from the wound  She has not had any purulence or discharge in it does seem to be scabbing over and healing    She is also here for follow-up of chronic health conditions and recent labs that were done  She does have a history of diabetes and continues on metformin therapy  Her blood sugars have been stable  She also continues statin therapy for her cholesterol  She has been feeling otherwise well  The following portions of the patient's history were reviewed and updated as appropriate: allergies, current medications, past family history, past medical history, past social history, past surgical history and problem list       Review of Systems   Constitutional: Negative for chills, fatigue and fever  HENT: Negative for congestion, ear pain and sinus pressure  Eyes: Negative for visual disturbance  Respiratory: Negative for cough, chest tightness and shortness of breath  Cardiovascular: Negative for chest pain and palpitations  Gastrointestinal: Negative for diarrhea, nausea and vomiting  Endocrine: Negative for polyuria  Genitourinary: Negative for dysuria and frequency  Musculoskeletal: Negative for arthralgias and myalgias  Skin: Positive for wound  Negative for pallor and rash  Neurological: Negative for dizziness, weakness, light-headedness, numbness and headaches  Psychiatric/Behavioral: Negative for agitation, behavioral problems and sleep disturbance  All other systems reviewed and are negative  Data to review:       Objective:    Vitals:    07/29/19 1323   BP: 122/64   BP Location: Left arm   Patient Position: Sitting   Cuff Size: Standard   Pulse: 62   Weight: 74 7 kg (164 lb 9 6 oz)   Height: 5' 4" (1 626 m)        Physical Exam   Constitutional: She is oriented to person, place, and time  She appears well-developed and well-nourished  No distress  HENT:   Head: Normocephalic and atraumatic  Right Ear: External ear normal    Left Ear: External ear normal    Nose: Nose normal    Mouth/Throat: Oropharynx is clear and moist  No oropharyngeal exudate  Eyes: Pupils are equal, round, and reactive to light   Conjunctivae and EOM are normal    Neck: Normal range of motion  Neck supple  No tracheal deviation present  No thyromegaly present  Cardiovascular: Normal rate, regular rhythm and normal heart sounds  Exam reveals no friction rub  No murmur heard  Pulmonary/Chest: Effort normal and breath sounds normal  No respiratory distress  She has no wheezes  She has no rales  Abdominal: Soft  Bowel sounds are normal  She exhibits no distension  There is no tenderness  There is no rebound and no guarding  Musculoskeletal: Normal range of motion  She exhibits no edema or tenderness  Lymphadenopathy:     She has no cervical adenopathy  Neurological: She is alert and oriented to person, place, and time  No cranial nerve deficit  Coordination normal    Skin: Skin is warm and dry  No rash noted  There is erythema  Skin tear other left anterior tibial region which is approximately 6 cm in length  Patient does have surrounding erythema which extends about 1 cm proximal and 1 cm distal to the wound but expands probably 5 cm lateral in each direction from the wound  There is some tenderness to palpation  There is no warmth or purulence present  Psychiatric: She has a normal mood and affect  Her behavior is normal  Thought content normal    Nursing note and vitals reviewed  BMI Counseling: Body mass index is 28 25 kg/m²  Discussed the patient's BMI with her  The BMI is above average  BMI counseling and education was provided to the patient  Nutrition recommendations include reducing portion sizes

## 2019-07-29 NOTE — PATIENT INSTRUCTIONS
1   Skin tear of the left anterior tibial region with localized cellulitis-recommend starting Keflex 500 mg 3 times daily for 10 days  Encourage patient to continue bandaging once daily as needed  2  Type 2 diabetes-presently stable with hemoglobin A1c of 5 9  Continue metformin therapy  Continue follow-up with Ophthalmology and podiatry  3  Micro albuminuria-stable with microalbumin of 50  Patient is currently on losartan therapy and with good glucose control  4  Hyperlipidemia-stable on  pravastatin  Cholesterol numbers were reviewed  No medication changes  5  Hypertension-presently stable with losartan, hydrochlorothiazide, and atenolol  6  Atrial fibrillation-stable on warfarin therapy long-term

## 2019-08-20 ENCOUNTER — ANTICOAG VISIT (OUTPATIENT)
Dept: CARDIOLOGY CLINIC | Facility: CLINIC | Age: 84
End: 2019-08-20

## 2019-08-20 ENCOUNTER — APPOINTMENT (OUTPATIENT)
Dept: LAB | Facility: CLINIC | Age: 84
End: 2019-08-20
Payer: MEDICARE

## 2019-08-20 ENCOUNTER — TRANSCRIBE ORDERS (OUTPATIENT)
Dept: LAB | Facility: CLINIC | Age: 84
End: 2019-08-20

## 2019-08-20 DIAGNOSIS — Z79.891 LONG-TERM CURRENT USE OF OPIATE ANALGESIC: ICD-10-CM

## 2019-08-20 DIAGNOSIS — I48.91 ATRIAL FIBRILLATION, UNSPECIFIED TYPE (HCC): ICD-10-CM

## 2019-08-20 DIAGNOSIS — Z95.2 HISTORY OF MITRAL VALVE REPLACEMENT: ICD-10-CM

## 2019-08-20 NOTE — PROGRESS NOTES
8/20/19, tc to pt, will continue current dose, inr due 4 weeks  Pt completed antibiotic for cellulitis   bharathi

## 2019-08-21 ENCOUNTER — OFFICE VISIT (OUTPATIENT)
Dept: CARDIOLOGY CLINIC | Facility: CLINIC | Age: 84
End: 2019-08-21
Payer: MEDICARE

## 2019-08-21 VITALS
HEIGHT: 64 IN | SYSTOLIC BLOOD PRESSURE: 106 MMHG | DIASTOLIC BLOOD PRESSURE: 64 MMHG | HEART RATE: 75 BPM | RESPIRATION RATE: 19 BRPM | OXYGEN SATURATION: 97 % | BODY MASS INDEX: 27.14 KG/M2 | WEIGHT: 159 LBS

## 2019-08-21 DIAGNOSIS — Z95.2 HISTORY OF MITRAL VALVE REPLACEMENT: ICD-10-CM

## 2019-08-21 DIAGNOSIS — E78.2 MIXED HYPERLIPIDEMIA: ICD-10-CM

## 2019-08-21 DIAGNOSIS — I48.91 ATRIAL FIBRILLATION, UNSPECIFIED TYPE (HCC): Primary | ICD-10-CM

## 2019-08-21 PROCEDURE — 99214 OFFICE O/P EST MOD 30 MIN: CPT | Performed by: INTERNAL MEDICINE

## 2019-08-21 PROCEDURE — 93000 ELECTROCARDIOGRAM COMPLETE: CPT | Performed by: INTERNAL MEDICINE

## 2019-08-21 NOTE — PROGRESS NOTES
Cardiology Follow Up        Aaron Garcia      1934      [id number]      Discussion/Summary:    1  History of mechanical mitral valve replacement, 1997, prior history of bioprosthesis in 1986 which later failed   2  Permanent atrial fibrillation  3  Benign essential hypertension  4  Trace lower extremity edema, stable  5  Dyslipidemia  6  Benign essential hypertension    · Normal cardiac examination, no significant cardiac murmur, continue warfarin anticoagulation  Antibiotic prophylaxis prior to any sent over, had Lovenox bridging at the time of warfarin interruption is required  · Atrial fibrillation is adequately rate controlled, continue atenolol  · Continue warfarin anticoagulation, prior levels over past month have been therapeutic  · Blood pressure well controlled, continue losartan/HCTZ  · Continue pravastatin for dyslipidemia, prior lipid panel reviewed from 07/25/2019, well controlled    Follow-up in 6 months      Interval History: This is a very pleasant 27-year-old female with a 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 prolapse, who underwent mitral valve replacement with a bioprosthesis  This failed about 10 years later and in 1997 she received a [place] mechanical mitral valve prosthesis  She has a history of permanent atrial fibrillation discovered on Holter monitoring in 2009, was followed Dr Li Cavazos at [place] for many years then switched over to our office  She has a 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, possibly residual surgical material     [place] presents today for follow-up with no complaints  Several weeks ago she bumped her left leg against the bed at her daughter's house resulting in a laceration  She denies any fevers or chills, and laceration is healing well  She denies chest pain, shortness of breath, lower extremity edema, orthopnea, dizziness    She has not had any major falls since her last visit  Vitals:  Vitals:    08/21/19 1009   BP: 106/64   BP Location: Right arm   Patient Position: Sitting   Cuff Size: Standard   Pulse: 75   Resp: 19   SpO2: 97%   Weight: 72 1 kg (159 lb)   Height: 5' 4" (1 626 m)         Past Medical History:   Diagnosis Date    Arthritis     Benign polyp of large intestine     Chronic pain disorder     bilateral back - last assessed 12/27/17    Diabetes mellitus ([facility] [address]     Endocarditis     Hypertension     Hypertension     Spinal stenosis     Stroke syndrome 1985     Social History     Socioeconomic History    Marital status:       Spouse name: Not on file    Number of children: 2    Years of education: Not on file    Highest education level: Not on file   Occupational History    Occupation: Retired   Social Needs    Financial resource strain: Not on file    Food insecurity:     Worry: Not on file     Inability: Not on file   [employer] needs:     Medical: Not on file     Non-medical: Not on file   Tobacco Use    Smoking status: Never Smoker    Smokeless tobacco: Never Used   Substance and Sexual Activity    Alcohol use: No    Drug use: No    Sexual activity: Not on file   Lifestyle    Physical activity:     Days per week: Not on file     Minutes per session: Not on file    Stress: Not on file   Relationships    Social connections:     Talks on phone: Not on file     Gets together: Not on file     Attends Baptism service: Not on file     Active member of club or organization: Not on file     Attends meetings of clubs or organizations: Not on file     Relationship status: Not on file    Intimate partner violence:     Fear of current or ex partner: Not on file     Emotionally abused: Not on file     Physically abused: Not on file     Forced sexual activity: Not on file   Other Topics Concern    Not on file   Social History Narrative    Not on file      Family History   Problem Relation Age of Onset    Clotting disorder Mother         disorder of blood and blood forming organs    Heart attack Father     Cancer Maternal Grandfather         doesnt know age   [facility] Heart disease Paternal Grandfather     Diabetes Family      Past Surgical History:   Procedure Laterality Date    BACK SURGERY      CARPAL TUNNEL RELEASE Left     CATARACT EXTRACTION, BILATERAL  2011    CHOLECYSTECTOMY      FOOT SURGERY Right     GALLBLADDER SURGERY      LUMBAR LAMINECTOMY Right 11/08/2012    L3-4 min  invasive far lateral microdiscectomy for decompression of nerve root, transforaminal epidural steroid injection; operating room microscope for microdissection    MITRAL VALVE REPLACEMENT      biolical valve failed [address] [facility] [address]  [facility] N/A 3/17/2017    Procedure: REPAIR HERNIA UMBILICAL;  Surgeon: Rogerio Lang DO;  [facility];  Service:     VALVE REPLACEMENT         Current Outpatient Medications:     atenolol (TENORMIN) 50 mg tablet, TAKE 1 TABLET EVERY DAY, Disp: 90 tablet, Rfl: 3    gabapentin (NEURONTIN) 300 mg capsule, Take 2 capsules (600 mg total) by mouth 3 (three) times a day for 90 days 6 tablets daily, Disp: 540 capsule, Rfl: 0    losartan-hydrochlorothiazide (HYZAAR) 100-12 5 MG per tablet, TAKE 1 TABLET EVERY DAY, Disp: 90 tablet, Rfl: 3    metFORMIN (GLUCOPHAGE-XR) 500 mg 24 hr tablet, TAKE 1 TABLET EVERY DAY, Disp: 90 tablet, Rfl: 3    pravastatin (PRAVACHOL) 40 mg tablet, TAKE 1 TABLET EVERY DAY, Disp: 90 tablet, Rfl: 3    traMADol (ULTRAM) 50 mg tablet, Take 1 tablet (50 mg total) by mouth daily as needed for moderate pain, Disp: 30 tablet, Rfl: 1    warfarin (COUMADIN) 5 mg tablet, TAKE 1 TABLET (5 MG TOTAL) BY MOUTH DAILY, Disp: 90 tablet, Rfl: 3        Review of Systems:  Review of Systems   Constitutional: Negative for activity change, fever and unexpected weight change     HENT: Negative for facial swelling, nosebleeds and voice change  Respiratory: Negative for chest tightness, shortness of breath and wheezing  Cardiovascular: Negative for chest pain, palpitations and leg swelling  Gastrointestinal: Negative for abdominal distention  Genitourinary: Negative for hematuria  Musculoskeletal: Negative for arthralgias  Skin: Positive for wound  Negative for color change, pallor and rash  Neurological: Negative for dizziness, seizures and syncope  Psychiatric/Behavioral: Negative for agitation  Physical Exam:  Physical Exam   Constitutional: She is oriented to person, place, and time  She appears well-developed and well-nourished  HENT:   Head: Normocephalic and atraumatic  Eyes: EOM are normal    Neck: Normal range of motion  Neck supple  Cardiovascular: Normal rate, regular rhythm, normal heart sounds and intact distal pulses  Pulmonary/Chest: Effort normal and breath sounds normal    Abdominal: Soft  Musculoskeletal: Normal range of motion  Neurological: She is alert and oriented to person, place, and time  Skin: Skin is warm and dry  Healing laceration noted on the lateral pretibial aspect of left leg, no warmth, no drainage, mild erythema   Psychiatric: She has a normal mood and affect  Her behavior is normal  Judgment and thought content normal    Vitals reviewed  This note was completed in part utilizing M-Modal Flu[employer] Direct Software  Grammatical errors, random word insertions, spelling mistakes, and incomplete sentences can be an occasional consequence of this system secondary to software limitations, ambient noise, and hardware issues  If you have any questions or concerns about the content, text, or information contained within the body of this dictation, please contact the provider for clarification

## 2019-09-02 DIAGNOSIS — E78.2 MIXED HYPERLIPIDEMIA: ICD-10-CM

## 2019-09-02 DIAGNOSIS — I48.91 ATRIAL FIBRILLATION, UNSPECIFIED TYPE (HCC): ICD-10-CM

## 2019-09-02 RX ORDER — WARFARIN SODIUM 5 MG/1
TABLET ORAL
Qty: 90 TABLET | Refills: 3 | Status: SHIPPED | OUTPATIENT
Start: 2019-09-02 | End: 2020-06-01

## 2019-09-02 RX ORDER — PRAVASTATIN SODIUM 40 MG
TABLET ORAL
Qty: 90 TABLET | Refills: 3 | Status: SHIPPED | OUTPATIENT
Start: 2019-09-02 | End: 2020-06-01

## 2019-09-17 ENCOUNTER — LAB (OUTPATIENT)
Dept: LAB | Facility: CLINIC | Age: 84
End: 2019-09-17
Payer: MEDICARE

## 2019-09-17 ENCOUNTER — TRANSCRIBE ORDERS (OUTPATIENT)
Dept: LAB | Facility: CLINIC | Age: 84
End: 2019-09-17

## 2019-09-17 DIAGNOSIS — I48.91 ATRIAL FIBRILLATION, UNSPECIFIED TYPE (HCC): Primary | ICD-10-CM

## 2019-09-17 LAB
INR PPP: 3.59 (ref 0.84–1.19)
PROTHROMBIN TIME: 35.3 SECONDS (ref 11.6–14.5)

## 2019-09-17 PROCEDURE — 85610 PROTHROMBIN TIME: CPT

## 2019-09-17 PROCEDURE — 36415 COLL VENOUS BLD VENIPUNCTURE: CPT

## 2019-09-18 ENCOUNTER — ANTICOAG VISIT (OUTPATIENT)
Dept: CARDIOLOGY CLINIC | Facility: CLINIC | Age: 84
End: 2019-09-18

## 2019-09-18 DIAGNOSIS — Z95.2 HISTORY OF MITRAL VALVE REPLACEMENT: ICD-10-CM

## 2019-09-18 DIAGNOSIS — Z79.891 LONG-TERM CURRENT USE OF OPIATE ANALGESIC: ICD-10-CM

## 2019-09-18 DIAGNOSIS — I48.91 ATRIAL FIBRILLATION, UNSPECIFIED TYPE (HCC): ICD-10-CM

## 2019-09-18 NOTE — PROGRESS NOTES
9/18/19, tc to pt, will take 2 5 mg today in place of 5 mg , then resume previous dose, inr due 2 weeks   bharathi

## 2019-09-30 ENCOUNTER — APPOINTMENT (OUTPATIENT)
Dept: LAB | Facility: MEDICAL CENTER | Age: 84
End: 2019-09-30
Payer: MEDICARE

## 2019-09-30 ENCOUNTER — OFFICE VISIT (OUTPATIENT)
Dept: PAIN MEDICINE | Facility: MEDICAL CENTER | Age: 84
End: 2019-09-30
Payer: MEDICARE

## 2019-09-30 VITALS
RESPIRATION RATE: 16 BRPM | WEIGHT: 162.8 LBS | SYSTOLIC BLOOD PRESSURE: 126 MMHG | BODY MASS INDEX: 27.79 KG/M2 | HEART RATE: 54 BPM | DIASTOLIC BLOOD PRESSURE: 68 MMHG | HEIGHT: 64 IN

## 2019-09-30 DIAGNOSIS — G89.29 CHRONIC BILATERAL LOW BACK PAIN WITH BILATERAL SCIATICA: ICD-10-CM

## 2019-09-30 DIAGNOSIS — G89.4 PAIN SYNDROME, CHRONIC: ICD-10-CM

## 2019-09-30 DIAGNOSIS — I48.91 ATRIAL FIBRILLATION, UNSPECIFIED TYPE (HCC): ICD-10-CM

## 2019-09-30 DIAGNOSIS — M54.42 CHRONIC BILATERAL LOW BACK PAIN WITH BILATERAL SCIATICA: ICD-10-CM

## 2019-09-30 DIAGNOSIS — M54.41 CHRONIC BILATERAL LOW BACK PAIN WITH BILATERAL SCIATICA: ICD-10-CM

## 2019-09-30 PROCEDURE — 99214 OFFICE O/P EST MOD 30 MIN: CPT | Performed by: NURSE PRACTITIONER

## 2019-09-30 RX ORDER — TRAMADOL HYDROCHLORIDE 50 MG/1
50 TABLET ORAL DAILY PRN
Qty: 30 TABLET | Refills: 0 | Status: SHIPPED | OUTPATIENT
Start: 2019-09-30 | End: 2020-09-09 | Stop reason: ALTCHOICE

## 2019-09-30 NOTE — PROGRESS NOTES
Assessment  1  Pain syndrome, chronic    2  Chronic bilateral low back pain with bilateral sciatica        Plan  Continue with gabapentin as prescribed  The patient does not require a refill today  Increase Tylenol Arthritis to 3 times daily  Continue with tramadol only on an as-needed basis  I will provide her with a refill  Follow-up in 12 weeks for medication refill      [place] Prescription Drug Monitoring Program report was reviewed and was appropriate         My impressions and treatment recommendations were discussed in detail with the patient who verbalized understanding and had no further questions  Discharge instructions were provided  I personally saw and examined the patient and I agree with the above discussed plan of care  No orders of the defined types were placed in this encounter  New Medications Ordered This Visit   Medications    traMADol (ULTRAM) 50 mg tablet     Sig: Take 1 tablet (50 mg total) by mouth daily as needed for moderate pain     Dispense:  30 tablet     Refill:  0       History of Present Illness    Ayana Keyes is a 80 y o  female presents for follow-up related to her chronic low back pain  Today she rates her pain 8/10 this is constant in nature most bothersome in the morning  She describes the pain as throbbing, and shooting  Patient reports her pain symptoms have been a little bit worse lately especially her shoulder pains she thinks it may be related to the change in the weather  Patient uses tramadol only on an as-needed basis  She usually only takes this medication if she know she is going to be going somewhere  She also uses gabapentin  She has been taking Tylenol arthritis 2 tablets in the morning  She does get 50% relief from her medication regimen without any side effects or issues      I have personally reviewed and/or updated the patient's past medical history, past surgical history, family history, social history, current medications, allergies, and vital signs today  Review of Systems   Respiratory: Negative for shortness of breath  Cardiovascular: Negative for chest pain  Gastrointestinal: Positive for constipation  Negative for diarrhea, nausea and vomiting  Musculoskeletal: Positive for back pain (lumbar), gait problem, joint swelling (feet) and neck stiffness (Shoulder pain)  Negative for arthralgias and myalgias  Skin: Negative for rash  Neurological: Negative for dizziness, seizures and weakness  All other systems reviewed and are negative  Patient Active Problem List   Diagnosis    Arthritis    Atrial fibrillation (HCC)    Chronic congestive heart failure (HCC)    Chronic lumbar radiculopathy    Coagulation defect (HCC)    Hemorrhoids    History of mitral valve replacement    Hyperlipidemia    Hypertension    Myofascial pain syndrome    Osteopenia    Chronic pain syndrome    Sacroiliitis (HCC)    Type 2 diabetes mellitus ([address] )    Vitamin D deficiency    Long-term current use of opiate analgesic    Microalbuminuria       Past Medical History:   Diagnosis Date    Arthritis     Benign polyp of large intestine     Chronic pain disorder     bilateral back - last assessed 12/27/17    Diabetes mellitus ([address] )     Endocarditis     Hypertension     Hypertension     Spinal stenosis     Stroke syndrome 1985       Past Surgical History:   Procedure Laterality Date    BACK SURGERY      CARPAL TUNNEL RELEASE Left     CATARACT EXTRACTION, BILATERAL  2011    CHOLECYSTECTOMY      FOOT SURGERY Right     GALLBLADDER SURGERY      LUMBAR LAMINECTOMY Right 11/08/2012    L3-4 min   invasive far lateral microdiscectomy for decompression of nerve root, transforaminal epidural steroid injection; operating room microscope for microdissection    MITRAL VALVE REPLACEMENT      biolical valve failed [address] [facility] [place] [address] N/A 3/17/2017    Procedure: REPAIR HERNIA UMBILICAL;  Surgeon: Leo Crowe DO;  [facility];  Service:     VALVE REPLACEMENT         Family History   Problem Relation Age of Onset    Clotting disorder Mother         disorder of blood and blood forming organs    Heart attack Father     Cancer Maternal Grandfather         doesnt know age   [facility] Heart disease Paternal Grandfather     Diabetes Family        Social History     Occupational History    Occupation: Retired   Tobacco Use    Smoking status: Never Smoker    Smokeless tobacco: Never Used   Substance and Sexual Activity    Alcohol use: No    Drug use: No    Sexual activity: Not on file       Current Outpatient Medications on File Prior to Visit   Medication Sig    atenolol (TENORMIN) 50 mg tablet TAKE 1 TABLET EVERY DAY    gabapentin (NEURONTIN) 300 mg capsule Take 2 capsules (600 mg total) by mouth 3 (three) times a day for 90 days 6 tablets daily    losartan-hydrochlorothiazide (HYZAAR) 100-12 5 MG per tablet TAKE 1 TABLET EVERY DAY    metFORMIN (GLUCOPHAGE-XR) 500 mg 24 hr tablet TAKE 1 TABLET EVERY DAY    pravastatin (PRAVACHOL) 40 mg tablet TAKE 1 TABLET EVERY DAY    warfarin (COUMADIN) 5 mg tablet TAKE 1 TABLET (5 MG TOTAL) DAILY    [DISCONTINUED] traMADol (ULTRAM) 50 mg tablet Take 1 tablet (50 mg total) by mouth daily as needed for moderate pain (Patient not taking: Reported on 9/30/2019)     No current facility-administered medications on file prior to visit  Allergies   Allergen Reactions    Lisinopril      cough       Physical Exam    /68   Pulse (!) 54   Resp 16   Ht 5' 4" (1 626 m)   Wt 73 8 kg (162 lb 12 8 oz)   BMI 27 94 kg/m²     Constitutional: normal, well developed, well nourished, alert, in no distress and non-toxic and no overt pain behavior    Eyes: anicteric  HEENT: grossly intact  Neck: supple, symmetric, trachea midline and no masses   Pulmonary:even and unlabored  Cardiovascular:No edema or pitting edema present  Skin:Normal without rashes or lesions and well hydrated  Psychiatric:Mood and affect appropriate  Neurologic:Cranial Nerves II-XII grossly intact  Musculoskeletal:ambulates with cane    Imaging

## 2019-10-01 ENCOUNTER — ANTICOAG VISIT (OUTPATIENT)
Dept: CARDIOLOGY CLINIC | Facility: CLINIC | Age: 84
End: 2019-10-01

## 2019-10-01 DIAGNOSIS — Z79.891 LONG-TERM CURRENT USE OF OPIATE ANALGESIC: ICD-10-CM

## 2019-10-01 DIAGNOSIS — Z95.2 HISTORY OF MITRAL VALVE REPLACEMENT: ICD-10-CM

## 2019-10-01 NOTE — PROGRESS NOTES
10/1/19, tc to pt, denies any bleeding  Will hold x 1 days, then decrease dose, 2 5 mg m/w/f, 5 mg other days of the week, inr due 1  Week   bharathi

## 2019-10-08 DIAGNOSIS — E11.9 TYPE 2 DIABETES MELLITUS WITHOUT COMPLICATION, WITHOUT LONG-TERM CURRENT USE OF INSULIN (HCC): ICD-10-CM

## 2019-10-08 RX ORDER — METFORMIN HYDROCHLORIDE 500 MG/1
TABLET, EXTENDED RELEASE ORAL
Qty: 90 TABLET | Refills: 0 | Status: SHIPPED | OUTPATIENT
Start: 2019-10-08 | End: 2019-12-11 | Stop reason: SDUPTHER

## 2019-10-09 ENCOUNTER — APPOINTMENT (OUTPATIENT)
Dept: LAB | Facility: CLINIC | Age: 84
End: 2019-10-09
Payer: MEDICARE

## 2019-10-09 ENCOUNTER — ANTICOAG VISIT (OUTPATIENT)
Dept: CARDIOLOGY CLINIC | Facility: CLINIC | Age: 84
End: 2019-10-09

## 2019-10-09 DIAGNOSIS — I48.91 ATRIAL FIBRILLATION, UNSPECIFIED TYPE (HCC): ICD-10-CM

## 2019-10-09 DIAGNOSIS — Z79.891 LONG-TERM CURRENT USE OF OPIATE ANALGESIC: ICD-10-CM

## 2019-10-09 DIAGNOSIS — Z95.2 HISTORY OF MITRAL VALVE REPLACEMENT: ICD-10-CM

## 2019-10-09 LAB
INR PPP: 3.69 (ref 0.84–1.19)
PROTHROMBIN TIME: 36.1 SECONDS (ref 11.6–14.5)

## 2019-10-09 PROCEDURE — 85610 PROTHROMBIN TIME: CPT

## 2019-10-09 PROCEDURE — 36415 COLL VENOUS BLD VENIPUNCTURE: CPT

## 2019-10-09 NOTE — PROGRESS NOTES
10/9/19, tc to pt, denies any changes in health, medication or bleeding  Will decrease dose, 2 5 mg m/w/f/sat, 5 mg other days of the week, inr due 2 weeks   bharathi

## 2019-10-23 ENCOUNTER — APPOINTMENT (OUTPATIENT)
Dept: LAB | Facility: CLINIC | Age: 84
End: 2019-10-23
Payer: MEDICARE

## 2019-10-24 ENCOUNTER — ANTICOAG VISIT (OUTPATIENT)
Dept: CARDIOLOGY CLINIC | Facility: CLINIC | Age: 84
End: 2019-10-24

## 2019-10-24 DIAGNOSIS — Z79.891 LONG-TERM CURRENT USE OF OPIATE ANALGESIC: ICD-10-CM

## 2019-10-24 DIAGNOSIS — I48.91 ATRIAL FIBRILLATION, UNSPECIFIED TYPE (HCC): ICD-10-CM

## 2019-10-24 DIAGNOSIS — Z95.2 HISTORY OF MITRAL VALVE REPLACEMENT: ICD-10-CM

## 2019-11-07 ENCOUNTER — APPOINTMENT (OUTPATIENT)
Dept: LAB | Facility: CLINIC | Age: 84
End: 2019-11-07
Payer: MEDICARE

## 2019-11-07 ENCOUNTER — ANTICOAG VISIT (OUTPATIENT)
Dept: CARDIOLOGY CLINIC | Facility: CLINIC | Age: 84
End: 2019-11-07

## 2019-11-07 DIAGNOSIS — Z79.891 LONG-TERM CURRENT USE OF OPIATE ANALGESIC: ICD-10-CM

## 2019-11-07 DIAGNOSIS — Z95.2 HISTORY OF MITRAL VALVE REPLACEMENT: ICD-10-CM

## 2019-11-07 DIAGNOSIS — I48.91 ATRIAL FIBRILLATION, UNSPECIFIED TYPE (HCC): ICD-10-CM

## 2019-11-07 NOTE — PROGRESS NOTES
11/7/19, tc to pt, left message on answering machine, continue current dose, inr due 2 weeks   bharathi

## 2019-11-11 DIAGNOSIS — I10 HYPERTENSION, UNSPECIFIED TYPE: ICD-10-CM

## 2019-11-11 RX ORDER — ATENOLOL 50 MG/1
TABLET ORAL
Qty: 90 TABLET | Refills: 0 | Status: SHIPPED | OUTPATIENT
Start: 2019-11-11 | End: 2020-01-15

## 2019-11-13 LAB
LEFT EYE DIABETIC RETINOPATHY: NORMAL
RIGHT EYE DIABETIC RETINOPATHY: NORMAL

## 2019-11-20 ENCOUNTER — APPOINTMENT (OUTPATIENT)
Dept: LAB | Facility: CLINIC | Age: 84
End: 2019-11-20
Payer: MEDICARE

## 2019-11-21 ENCOUNTER — ANTICOAG VISIT (OUTPATIENT)
Dept: CARDIOLOGY CLINIC | Facility: CLINIC | Age: 84
End: 2019-11-21

## 2019-11-21 DIAGNOSIS — Z79.891 LONG-TERM CURRENT USE OF OPIATE ANALGESIC: ICD-10-CM

## 2019-11-21 DIAGNOSIS — Z95.2 HISTORY OF MITRAL VALVE REPLACEMENT: ICD-10-CM

## 2019-11-21 DIAGNOSIS — I48.91 ATRIAL FIBRILLATION, UNSPECIFIED TYPE (HCC): ICD-10-CM

## 2019-12-11 DIAGNOSIS — E11.9 TYPE 2 DIABETES MELLITUS WITHOUT COMPLICATION, WITHOUT LONG-TERM CURRENT USE OF INSULIN (HCC): ICD-10-CM

## 2019-12-11 RX ORDER — METFORMIN HYDROCHLORIDE 500 MG/1
TABLET, EXTENDED RELEASE ORAL
Qty: 90 TABLET | Refills: 0 | Status: SHIPPED | OUTPATIENT
Start: 2019-12-11 | End: 2020-02-17

## 2019-12-12 ENCOUNTER — APPOINTMENT (OUTPATIENT)
Dept: LAB | Facility: CLINIC | Age: 84
End: 2019-12-12
Payer: MEDICARE

## 2019-12-13 ENCOUNTER — ANTICOAG VISIT (OUTPATIENT)
Dept: CARDIOLOGY CLINIC | Facility: CLINIC | Age: 84
End: 2019-12-13

## 2019-12-13 DIAGNOSIS — Z95.2 HISTORY OF MITRAL VALVE REPLACEMENT: ICD-10-CM

## 2019-12-13 DIAGNOSIS — I48.91 ATRIAL FIBRILLATION, UNSPECIFIED TYPE (HCC): ICD-10-CM

## 2019-12-13 DIAGNOSIS — Z79.891 LONG-TERM CURRENT USE OF OPIATE ANALGESIC: ICD-10-CM

## 2019-12-30 ENCOUNTER — OFFICE VISIT (OUTPATIENT)
Dept: PAIN MEDICINE | Facility: MEDICAL CENTER | Age: 84
End: 2019-12-30
Payer: MEDICARE

## 2019-12-30 ENCOUNTER — TELEPHONE (OUTPATIENT)
Dept: PAIN MEDICINE | Facility: MEDICAL CENTER | Age: 84
End: 2019-12-30

## 2019-12-30 VITALS
HEART RATE: 72 BPM | DIASTOLIC BLOOD PRESSURE: 70 MMHG | WEIGHT: 160 LBS | BODY MASS INDEX: 27.31 KG/M2 | SYSTOLIC BLOOD PRESSURE: 128 MMHG | HEIGHT: 64 IN | RESPIRATION RATE: 14 BRPM

## 2019-12-30 DIAGNOSIS — M54.16 CHRONIC LUMBAR RADICULOPATHY: Primary | ICD-10-CM

## 2019-12-30 DIAGNOSIS — M79.18 MYOFASCIAL PAIN SYNDROME: ICD-10-CM

## 2019-12-30 DIAGNOSIS — G89.4 CHRONIC PAIN SYNDROME: ICD-10-CM

## 2019-12-30 PROCEDURE — 99213 OFFICE O/P EST LOW 20 MIN: CPT | Performed by: NURSE PRACTITIONER

## 2019-12-30 NOTE — TELEPHONE ENCOUNTER
Pt called to report that she received paperwork for todays visit  With her paperwork she noticed she also received a receipt for a 50$ co-pay under a patient named Elijah Mediate   was made aware and pt was advised to discard receipt

## 2019-12-30 NOTE — PROGRESS NOTES
Assessment  1  Chronic lumbar radiculopathy    2  Myofascial pain syndrome    3  Chronic pain syndrome        Plan  Continue with tramadol and gabapentin she does not require any refills today  Continue with over-the-counter Tylenol as needed  Continue with over-the-counter Lidocaine creams  Patient will follow up in 12 weeks      There are risks associated with opioid medications, including dependence, addiction and tolerance  The patient understands and agrees to use these medications only as prescribed  Potential side effects of the medications include, but are not limited to, constipation, drowsiness, addiction, impaired judgment and risk of fatal overdose if not taken as prescribed  The patient was warned against driving while taking sedation medications  Sharing medications is a felony  At this point in time, the patient is showing no signs of addiction, abuse, diversion or suicidal ideation  [facility] Prescription Drug Monitoring Program report was reviewed and was appropriate         My impressions and treatment recommendations were discussed in detail with the patient who verbalized understanding and had no further questions  Discharge instructions were provided  I personally saw and examined the patient and I agree with the above discussed plan of care  No orders of the defined types were placed in this encounter  No orders of the defined types were placed in this encounter  Tramadol last taken 12/25 am          History of Present Illness    Sharon Diaz is a 80 y o  female presents for follow-up related to her chronic low back pain  Today she reports that she is doing a little worse and rates her pain 7/10  She does have intermittent pain which is most bothersome in the morning  She describes his pain as pressure-like, and shooting  She is also having pain in her shoulders bilaterally  Patient attributes her increasing pain to the damp in cold weather    She takes gabapentin 600 mg 3 times a day, she uses Tylenol arthritis and she also uses tramadol on an as-needed basis 50 mg daily  She tells me that the last time she took this medication was on December 25, 2019  She generally only takes the medication if she is going to be very active or away for the day  Patient has had her shoulders evaluated in the past by Orthopedic surgery  I did offer her an appointment with Dr Dana Pérez for new area of pain however she is not interested at this time  I have personally reviewed and/or updated the patient's past medical history, past surgical history, family history, social history, current medications, allergies, and vital signs today  Review of Systems   Gastrointestinal: Positive for constipation  Musculoskeletal: Positive for back pain (B/L shoulders and right lumbar ), gait problem, joint swelling (feet) and myalgias         Patient Active Problem List   Diagnosis    Arthritis    Atrial fibrillation (HCC)    Chronic congestive heart failure (HCC)    Chronic lumbar radiculopathy    Coagulation defect ([address] )    Hemorrhoids    History of mitral valve replacement    Hyperlipidemia    Hypertension    Myofascial pain syndrome    Osteopenia    Chronic pain syndrome    Sacroiliitis (HCC)    Type 2 diabetes mellitus ([address] )    Vitamin D deficiency    Long-term current use of opiate analgesic    Microalbuminuria       Past Medical History:   Diagnosis Date    Arthritis     Benign polyp of large intestine     Chronic pain disorder     bilateral back - last assessed 12/27/17    Diabetes mellitus ([address] )     Endocarditis     Hypertension     Hypertension     Spinal stenosis     Stroke syndrome 1985       Past Surgical History:   Procedure Laterality Date    BACK SURGERY      CARPAL TUNNEL RELEASE Left     CATARACT EXTRACTION, BILATERAL  2011    CHOLECYSTECTOMY      FOOT SURGERY Right     GALLBLADDER SURGERY      LUMBAR LAMINECTOMY Right 11/08/2012 L3-4 min  invasive far lateral microdiscectomy for decompression of nerve root, transforaminal epidural steroid injection; operating room microscope for microdissection    MITRAL VALVE REPLACEMENT      biolical valve failed [address] [facility] [address]  [facility] N/A 3/17/2017    Procedure: REPAIR HERNIA UMBILICAL;  Surgeon: Rocky Field DO;  [facility];  Service:     VALVE REPLACEMENT         Family History   Problem Relation Age of Onset    Clotting disorder Mother         disorder of blood and blood forming organs    Heart attack Father     Cancer Maternal Grandfather         doesnt know age   [facility] Heart disease Paternal Grandfather     Diabetes Family        Social History     Occupational History    Occupation: Retired   Tobacco Use    Smoking status: Never Smoker    Smokeless tobacco: Never Used   Substance and Sexual Activity    Alcohol use: No    Drug use: No    Sexual activity: Not on file       Current Outpatient Medications on File Prior to Visit   Medication Sig    atenolol (TENORMIN) 50 mg tablet TAKE 1 TABLET EVERY DAY    gabapentin (NEURONTIN) 300 mg capsule Take 2 capsules (600 mg total) by mouth 3 (three) times a day for 90 days 6 tablets daily    losartan-hydrochlorothiazide (HYZAAR) 100-12 5 MG per tablet TAKE 1 TABLET EVERY DAY    metFORMIN (GLUCOPHAGE-XR) 500 mg 24 hr tablet TAKE 1 TABLET EVERY DAY    pravastatin (PRAVACHOL) 40 mg tablet TAKE 1 TABLET EVERY DAY    traMADol (ULTRAM) 50 mg tablet Take 1 tablet (50 mg total) by mouth daily as needed for moderate pain    warfarin (COUMADIN) 5 mg tablet TAKE 1 TABLET (5 MG TOTAL) DAILY     No current facility-administered medications on file prior to visit          Allergies   Allergen Reactions    Lisinopril      cough       Physical Exam    /70   Pulse 72   Resp 14   Ht 5' 4" (1 626 m)   Wt 72 6 kg (160 lb)   BMI 27 46 kg/m²     Constitutional: normal, well developed, well nourished, alert, in no distress and non-toxic and no overt pain behavior    Eyes: anicteric  HEENT: grossly intact  Neck: supple, symmetric, trachea midline and no masses   Pulmonary:even and unlabored  Cardiovascular:No edema or pitting edema present  Skin:Normal without rashes or lesions and well hydrated  Psychiatric:Mood and affect appropriate  Neurologic:Cranial Nerves II-XII grossly intact  Musculoskeletal:ambulates with cane    Imaging

## 2020-01-06 ENCOUNTER — TRANSCRIBE ORDERS (OUTPATIENT)
Dept: ADMINISTRATIVE | Facility: HOSPITAL | Age: 85
End: 2020-01-06

## 2020-01-06 DIAGNOSIS — Z12.31 VISIT FOR SCREENING MAMMOGRAM: Primary | ICD-10-CM

## 2020-01-09 ENCOUNTER — ANTICOAG VISIT (OUTPATIENT)
Dept: CARDIOLOGY CLINIC | Facility: CLINIC | Age: 85
End: 2020-01-09

## 2020-01-09 ENCOUNTER — APPOINTMENT (OUTPATIENT)
Dept: LAB | Facility: CLINIC | Age: 85
End: 2020-01-09
Payer: MEDICARE

## 2020-01-09 DIAGNOSIS — I48.91 ATRIAL FIBRILLATION, UNSPECIFIED TYPE (HCC): ICD-10-CM

## 2020-01-09 DIAGNOSIS — Z95.2 HISTORY OF MITRAL VALVE REPLACEMENT: ICD-10-CM

## 2020-01-09 DIAGNOSIS — Z79.891 LONG-TERM CURRENT USE OF OPIATE ANALGESIC: ICD-10-CM

## 2020-01-15 DIAGNOSIS — I10 HYPERTENSION, UNSPECIFIED TYPE: ICD-10-CM

## 2020-01-15 RX ORDER — ATENOLOL 50 MG/1
TABLET ORAL
Qty: 90 TABLET | Refills: 0 | Status: SHIPPED | OUTPATIENT
Start: 2020-01-15 | End: 2020-06-15

## 2020-01-22 DIAGNOSIS — I10 ESSENTIAL HYPERTENSION: ICD-10-CM

## 2020-01-23 ENCOUNTER — APPOINTMENT (OUTPATIENT)
Dept: LAB | Facility: CLINIC | Age: 85
End: 2020-01-23
Payer: MEDICARE

## 2020-01-23 ENCOUNTER — ANTICOAG VISIT (OUTPATIENT)
Dept: CARDIOLOGY CLINIC | Facility: CLINIC | Age: 85
End: 2020-01-23

## 2020-01-23 DIAGNOSIS — Z79.891 LONG-TERM CURRENT USE OF OPIATE ANALGESIC: ICD-10-CM

## 2020-01-23 DIAGNOSIS — Z95.2 HISTORY OF MITRAL VALVE REPLACEMENT: ICD-10-CM

## 2020-01-23 DIAGNOSIS — I48.91 ATRIAL FIBRILLATION, UNSPECIFIED TYPE (HCC): ICD-10-CM

## 2020-01-23 RX ORDER — LOSARTAN POTASSIUM AND HYDROCHLOROTHIAZIDE 12.5; 1 MG/1; MG/1
TABLET ORAL
Qty: 90 TABLET | Refills: 0 | Status: SHIPPED | OUTPATIENT
Start: 2020-01-23 | End: 2020-03-28

## 2020-01-29 ENCOUNTER — APPOINTMENT (OUTPATIENT)
Dept: LAB | Facility: CLINIC | Age: 85
End: 2020-01-29
Payer: MEDICARE

## 2020-01-29 DIAGNOSIS — R80.9 TYPE 2 DIABETES MELLITUS WITH MICROALBUMINURIA, WITHOUT LONG-TERM CURRENT USE OF INSULIN (HCC): ICD-10-CM

## 2020-01-29 DIAGNOSIS — E11.29 TYPE 2 DIABETES MELLITUS WITH MICROALBUMINURIA, WITHOUT LONG-TERM CURRENT USE OF INSULIN (HCC): ICD-10-CM

## 2020-01-29 DIAGNOSIS — I10 ESSENTIAL HYPERTENSION: ICD-10-CM

## 2020-01-29 DIAGNOSIS — S51.019A SKIN TEAR OF ELBOW WITHOUT COMPLICATION, UNSPECIFIED LATERALITY, INITIAL ENCOUNTER: ICD-10-CM

## 2020-01-29 DIAGNOSIS — I48.91 ATRIAL FIBRILLATION, UNSPECIFIED TYPE (HCC): ICD-10-CM

## 2020-01-29 DIAGNOSIS — E78.2 MIXED HYPERLIPIDEMIA: ICD-10-CM

## 2020-01-29 DIAGNOSIS — E55.9 VITAMIN D DEFICIENCY: ICD-10-CM

## 2020-01-29 LAB
25(OH)D3 SERPL-MCNC: 26.4 NG/ML (ref 30–100)
ALBUMIN SERPL BCP-MCNC: 3.4 G/DL (ref 3.5–5)
ALP SERPL-CCNC: 48 U/L (ref 46–116)
ALT SERPL W P-5'-P-CCNC: 26 U/L (ref 12–78)
ANION GAP SERPL CALCULATED.3IONS-SCNC: 6 MMOL/L (ref 4–13)
AST SERPL W P-5'-P-CCNC: 16 U/L (ref 5–45)
BASOPHILS # BLD AUTO: 0.03 THOUSANDS/ΜL (ref 0–0.1)
BASOPHILS NFR BLD AUTO: 0 % (ref 0–1)
BILIRUB SERPL-MCNC: 0.58 MG/DL (ref 0.2–1)
BUN SERPL-MCNC: 23 MG/DL (ref 5–25)
CALCIUM SERPL-MCNC: 9.4 MG/DL (ref 8.3–10.1)
CHLORIDE SERPL-SCNC: 107 MMOL/L (ref 100–108)
CHOLEST SERPL-MCNC: 142 MG/DL (ref 50–200)
CO2 SERPL-SCNC: 31 MMOL/L (ref 21–32)
CREAT SERPL-MCNC: 0.7 MG/DL (ref 0.6–1.3)
CREAT UR-MCNC: 175 MG/DL
EOSINOPHIL # BLD AUTO: 0.08 THOUSAND/ΜL (ref 0–0.61)
EOSINOPHIL NFR BLD AUTO: 1 % (ref 0–6)
ERYTHROCYTE [DISTWIDTH] IN BLOOD BY AUTOMATED COUNT: 14 % (ref 11.6–15.1)
EST. AVERAGE GLUCOSE BLD GHB EST-MCNC: 128 MG/DL
GFR SERPL CREATININE-BSD FRML MDRD: 79 ML/MIN/1.73SQ M
GLUCOSE P FAST SERPL-MCNC: 124 MG/DL (ref 65–99)
HBA1C MFR BLD: 6.1 % (ref 4.2–6.3)
HCT VFR BLD AUTO: 40.1 % (ref 34.8–46.1)
HDLC SERPL-MCNC: 52 MG/DL
HGB BLD-MCNC: 12.8 G/DL (ref 11.5–15.4)
IMM GRANULOCYTES # BLD AUTO: 0.02 THOUSAND/UL (ref 0–0.2)
IMM GRANULOCYTES NFR BLD AUTO: 0 % (ref 0–2)
LDLC SERPL CALC-MCNC: 72 MG/DL (ref 0–100)
LYMPHOCYTES # BLD AUTO: 1.43 THOUSANDS/ΜL (ref 0.6–4.47)
LYMPHOCYTES NFR BLD AUTO: 16 % (ref 14–44)
MCH RBC QN AUTO: 29.9 PG (ref 26.8–34.3)
MCHC RBC AUTO-ENTMCNC: 31.9 G/DL (ref 31.4–37.4)
MCV RBC AUTO: 94 FL (ref 82–98)
MICROALBUMIN UR-MCNC: 83.7 MG/L (ref 0–20)
MICROALBUMIN/CREAT 24H UR: 48 MG/G CREATININE (ref 0–30)
MONOCYTES # BLD AUTO: 0.66 THOUSAND/ΜL (ref 0.17–1.22)
MONOCYTES NFR BLD AUTO: 7 % (ref 4–12)
NEUTROPHILS # BLD AUTO: 6.81 THOUSANDS/ΜL (ref 1.85–7.62)
NEUTS SEG NFR BLD AUTO: 76 % (ref 43–75)
NRBC BLD AUTO-RTO: 0 /100 WBCS
PLATELET # BLD AUTO: 226 THOUSANDS/UL (ref 149–390)
PMV BLD AUTO: 11.1 FL (ref 8.9–12.7)
POTASSIUM SERPL-SCNC: 3.5 MMOL/L (ref 3.5–5.3)
PROT SERPL-MCNC: 6.9 G/DL (ref 6.4–8.2)
RBC # BLD AUTO: 4.28 MILLION/UL (ref 3.81–5.12)
SODIUM SERPL-SCNC: 144 MMOL/L (ref 136–145)
TRIGL SERPL-MCNC: 90 MG/DL
TSH SERPL DL<=0.05 MIU/L-ACNC: 0.71 UIU/ML (ref 0.36–3.74)
WBC # BLD AUTO: 9.03 THOUSAND/UL (ref 4.31–10.16)

## 2020-01-29 PROCEDURE — 80061 LIPID PANEL: CPT

## 2020-01-29 PROCEDURE — 82043 UR ALBUMIN QUANTITATIVE: CPT

## 2020-01-29 PROCEDURE — 36415 COLL VENOUS BLD VENIPUNCTURE: CPT

## 2020-01-29 PROCEDURE — 80053 COMPREHEN METABOLIC PANEL: CPT

## 2020-01-29 PROCEDURE — 84443 ASSAY THYROID STIM HORMONE: CPT

## 2020-01-29 PROCEDURE — 83036 HEMOGLOBIN GLYCOSYLATED A1C: CPT

## 2020-01-29 PROCEDURE — 82570 ASSAY OF URINE CREATININE: CPT

## 2020-01-29 PROCEDURE — 82306 VITAMIN D 25 HYDROXY: CPT

## 2020-01-29 PROCEDURE — 85025 COMPLETE CBC W/AUTO DIFF WBC: CPT

## 2020-02-05 NOTE — PROGRESS NOTES
Assessment and Plan:     Problem List Items Addressed This Visit     None           Preventive health issues were discussed with patient, and age appropriate screening tests were ordered as noted in patient's After Visit Summary  Personalized health advice and appropriate referrals for health education or preventive services given if needed, as noted in patient's After Visit Summary  History of Present Illness:     Patient presents for Medicare Annual Wellness visit    Patient Care Team:  Tiffanie Zurita PA-C as PCP - General  PHILIP Horton PA-C Daryl Josie Goldsmith, MD (Inactive)  MD Meño Valenzuela DO Clarine Keens, MD     Problem List:     Patient Active Problem List   Diagnosis    Arthritis    Atrial fibrillation [facility])    Chronic congestive heart failure (HCC)    Chronic lumbar radiculopathy    Coagulation defect ([address]    Hemorrhoids    History of mitral valve replacement    Hyperlipidemia    Hypertension    Myofascial pain syndrome    Osteopenia    Chronic pain syndrome    Sacroiliitis ([address]    Type 2 diabetes mellitus ([facility] )    Vitamin D deficiency    Long-term current use of opiate analgesic    Microalbuminuria      Past Medical and Surgical History:     Past Medical History:   Diagnosis Date    Arthritis     Benign polyp of large intestine     Chronic pain disorder     bilateral back - last assessed 12/27/17    Diabetes mellitus ([address] )     Endocarditis     Hypertension     Hypertension     Spinal stenosis     Stroke syndrome 1985     Past Surgical History:   Procedure Laterality Date    BACK SURGERY      CARPAL TUNNEL RELEASE Left     CATARACT EXTRACTION, BILATERAL  2011    CHOLECYSTECTOMY      FOOT SURGERY Right     GALLBLADDER SURGERY      LUMBAR LAMINECTOMY Right 11/08/2012    L3-4 min   invasive far lateral microdiscectomy for decompression of nerve root, transforaminal epidural steroid injection; operating room microscope for microdissection    MITRAL VALVE REPLACEMENT      biolical valve failed [address]  [place] [address]  [facility] N/A 3/17/2017    Procedure: REPAIR HERNIA UMBILICAL;  Surgeon: Juliocesar Saldana DO;  [facility];  Service:     VALVE REPLACEMENT        Family History:     Family History   Problem Relation Age of Onset    Clotting disorder Mother         disorder of blood and blood forming organs    Heart attack Father     Cancer Maternal Grandfather         doesnt know age   [facility] Heart disease Paternal Grandfather     Diabetes Family       Social History:     Social History     Socioeconomic History    Marital status:       Spouse name: Not on file    Number of children: 2    Years of education: Not on file    Highest education level: Not on file   Occupational History    Occupation: Retired   Social Needs    Financial resource strain: Not on file    Food insecurity:     Worry: Not on file     Inability: Not on file   [employer] needs:     Medical: Not on file     Non-medical: Not on file   Tobacco Use    Smoking status: Never Smoker    Smokeless tobacco: Never Used   Substance and Sexual Activity    Alcohol use: No    Drug use: No    Sexual activity: Not on file   Lifestyle    Physical activity:     Days per week: Not on file     Minutes per session: Not on file    Stress: Not on file   Relationships    Social connections:     Talks on phone: Not on file     Gets together: Not on file     Attends Mandaen service: Not on file     Active member of club or organization: Not on file     Attends meetings of clubs or organizations: Not on file     Relationship status: Not on file    Intimate partner violence:     Fear of current or ex partner: Not on file     Emotionally abused: Not on file     Physically abused: Not on file     Forced sexual activity: Not on file   Other Topics Concern    Not on file   Social History Narrative    Not on file       Medications and Allergies:     Current Outpatient Medications   Medication Sig Dispense Refill    atenolol (TENORMIN) 50 mg tablet TAKE 1 TABLET EVERY DAY 90 tablet 0    gabapentin (NEURONTIN) 300 mg capsule Take 2 capsules (600 mg total) by mouth 3 (three) times a day for 90 days 6 tablets daily 540 capsule 0    losartan-hydrochlorothiazide (HYZAAR) 100-12 5 MG per tablet TAKE 1 TABLET EVERY DAY 90 tablet 0    metFORMIN (GLUCOPHAGE-XR) 500 mg 24 hr tablet TAKE 1 TABLET EVERY DAY 90 tablet 0    pravastatin (PRAVACHOL) 40 mg tablet TAKE 1 TABLET EVERY DAY 90 tablet 3    traMADol (ULTRAM) 50 mg tablet Take 1 tablet (50 mg total) by mouth daily as needed for moderate pain 30 tablet 0    warfarin (COUMADIN) 5 mg tablet TAKE 1 TABLET (5 MG TOTAL) DAILY 90 tablet 3     No current facility-administered medications for this visit  Allergies   Allergen Reactions    Lisinopril      cough      Immunizations:     Immunization History   Administered Date(s) Administered    Influenza Split High Dose Preservative Free IM 10/07/2013, 09/21/2014, 09/24/2015, 09/20/2016, 10/25/2017    Influenza TIV (IM) 11/15/2010, 10/02/2012    Influenza, high dose seasonal 0 5 mL 11/12/2018    Pneumococcal Conjugate 13-Valent 06/19/2017    Pneumococcal Polysaccharide PPV23 02/07/2014    Tdap 07/22/2019      Health Maintenance: There are no preventive care reminders to display for this patient  Topic Date Due    Hepatitis B Vaccine (1 of 3 - Risk 3-dose series) 04/08/1953    Influenza Vaccine  07/01/2019      Medicare Health Risk Assessment:     There were no vitals taken for this visit  Manuel Brito is here for her Subsequent Wellness visit  Health Risk Assessment:   Patient rates overall health as fair  Patient feels that their physical health rating is same  Eyesight was rated as slightly worse  Hearing was rated as same   Patient feels that their emotional and mental health rating is same  Pain experienced in the last 7 days has been none  Patient states that she has experienced no weight loss or gain in last 6 months  Depression Screening:   PHQ-2 Score: 0      Fall Risk Screening: In the past year, patient has experienced: history of falling in past year    Number of falls: 1  Injured during fall?: Yes    Feels unsteady when standing or walking?: No    Worried about falling?: Yes      Urinary Incontinence Screening:   Patient has leaked urine accidently in the last six months  Home Safety:  Patient does not have trouble with stairs inside or outside of their home  Patient has working smoke alarms and has working carbon monoxide detector  Home safety hazards include: none  Nutrition:   Current diet is No Added Salt  Medications:   Patient is currently taking over-the-counter supplements  OTC medications include: see medication list  Patient is able to manage medications  Activities of Daily Living (ADLs)/Instrumental Activities of Daily Living (IADLs):   Walk and transfer into and out of bed and chair?: Yes  Dress and groom yourself?: Yes    Bathe or shower yourself?: Yes    Feed yourself?  Yes  Do your laundry/housekeeping?: Yes  Manage your money, pay your bills and track your expenses?: Yes  Make your own meals?: Yes    Do your own shopping?: Yes    Previous Hospitalizations:   Any hospitalizations or ED visits within the last 12 months?: No      Advance Care Planning:   Living will: No    Five wishes given: Yes      Cognitive Screening:   Provider or family/friend/caregiver concerned regarding cognition?: No    PREVENTIVE SCREENINGS      Cardiovascular Screening:    General: Screening Not Indicated and History Lipid Disorder      Diabetes Screening:     General: Screening Not Indicated and History Diabetes      Colorectal Cancer Screening:     General: Screening Not Indicated      Breast Cancer Screening:     General: Screening Current      Cervical Cancer Screening:    General: Screening Not Indicated      Osteoporosis Screening:    General: Screening Not Indicated      Abdominal Aortic Aneurysm (AAA) Screening:        General: Screening Not Indicated      Lung Cancer Screening:     General: Screening Not Indicated      Hepatitis C Screening:    General: Screening Not Indicated      Sesar Mcneill PA-C

## 2020-02-05 NOTE — PROGRESS NOTES
Assessment and Plan:  Patient Instructions     1  Type 2 diabetes -presently stable with hemoglobin A1c of 6 2 on metformin therapy  Diabetic foot exam was performed today  2  Hypertension-presently stable with atenolol, losartan, hydrochlorothiazide  3   Hyperlipidemia -presently stable on statin therapy  Blood work was reviewed in detail with the patient  4  Rectal bleeding- this has been long-term and associated with hemorrhoids previously  Would recommend referral  Back to Dr Joshua Amezcua  In the meantime she will be given lactulose twice daily for constipation  5   Health care maintenance-annual Medicare wellness visit completed today    See separate note      Problem List Items Addressed This Visit        Endocrine    Type 2 diabetes mellitus ([facility] [address] ) - Primary    Relevant Orders    CBC and differential    Comprehensive metabolic panel    Hemoglobin A1C    Lipid Panel with Direct LDL reflex    Microalbumin / creatinine urine ratio    TSH, 3rd generation    Vitamin D 25 hydroxy       Cardiovascular and Mediastinum    Atrial fibrillation (HCC)    Relevant Orders    CBC and differential    Comprehensive metabolic panel    Hemoglobin A1C    Lipid Panel with Direct LDL reflex    Microalbumin / creatinine urine ratio    TSH, 3rd generation    Vitamin D 25 hydroxy    Chronic congestive heart failure (HCC)    Relevant Orders    CBC and differential    Comprehensive metabolic panel    Hemoglobin A1C    Lipid Panel with Direct LDL reflex    Microalbumin / creatinine urine ratio    TSH, 3rd generation    Vitamin D 25 hydroxy    Hypertension    Relevant Orders    CBC and differential    Comprehensive metabolic panel    Hemoglobin A1C    Lipid Panel with Direct LDL reflex    Microalbumin / creatinine urine ratio    TSH, 3rd generation    Vitamin D 25 hydroxy       Musculoskeletal and Integument    Sacroiliitis (HCC)    Relevant Orders    CBC and differential    Comprehensive metabolic panel    Hemoglobin A1C    Lipid Panel with Direct LDL reflex    Microalbumin / creatinine urine ratio    TSH, 3rd generation    Vitamin D 25 hydroxy       Other    Hyperlipidemia    Relevant Orders    CBC and differential    Comprehensive metabolic panel    Hemoglobin A1C    Lipid Panel with Direct LDL reflex    Microalbumin / creatinine urine ratio    TSH, 3rd generation    Vitamin D 25 hydroxy    Vitamin D deficiency    Relevant Orders    CBC and differential    Comprehensive metabolic panel    Hemoglobin A1C    Lipid Panel with Direct LDL reflex    Microalbumin / creatinine urine ratio    TSH, 3rd generation    Vitamin D 25 hydroxy    Microalbuminuria    Relevant Orders    CBC and differential    Comprehensive metabolic panel    Hemoglobin A1C    Lipid Panel with Direct LDL reflex    Microalbumin / creatinine urine ratio    TSH, 3rd generation    Vitamin D 25 hydroxy      Other Visit Diagnoses     Health care maintenance        Rectal bleeding        Relevant Medications    lactulose 20 g/30 mL    Other Relevant Orders    Ambulatory referral to Colorectal Surgery                 Diagnoses and all orders for this visit:    Type 2 diabetes mellitus with microalbuminuria, without long-term current use of insulin (HCC)  -     CBC and differential; Future  -     Comprehensive metabolic panel; Future  -     Hemoglobin A1C; Future  -     Lipid Panel with Direct LDL reflex; Future  -     Microalbumin / creatinine urine ratio; Future  -     TSH, 3rd generation; Future  -     Vitamin D 25 hydroxy; Future    Chronic congestive heart failure, unspecified heart failure type (HCC)  -     CBC and differential; Future  -     Comprehensive metabolic panel; Future  -     Hemoglobin A1C; Future  -     Lipid Panel with Direct LDL reflex; Future  -     Microalbumin / creatinine urine ratio; Future  -     TSH, 3rd generation; Future  -     Vitamin D 25 hydroxy;  Future    Essential hypertension  -     CBC and differential; Future  -     Comprehensive metabolic panel; Future  -     Hemoglobin A1C; Future  -     Lipid Panel with Direct LDL reflex; Future  -     Microalbumin / creatinine urine ratio; Future  -     TSH, 3rd generation; Future  -     Vitamin D 25 hydroxy; Future    Sacroiliitis (HCC)  -     CBC and differential; Future  -     Comprehensive metabolic panel; Future  -     Hemoglobin A1C; Future  -     Lipid Panel with Direct LDL reflex; Future  -     Microalbumin / creatinine urine ratio; Future  -     TSH, 3rd generation; Future  -     Vitamin D 25 hydroxy; Future    Microalbuminuria  -     CBC and differential; Future  -     Comprehensive metabolic panel; Future  -     Hemoglobin A1C; Future  -     Lipid Panel with Direct LDL reflex; Future  -     Microalbumin / creatinine urine ratio; Future  -     TSH, 3rd generation; Future  -     Vitamin D 25 hydroxy; Future    Vitamin D deficiency  -     CBC and differential; Future  -     Comprehensive metabolic panel; Future  -     Hemoglobin A1C; Future  -     Lipid Panel with Direct LDL reflex; Future  -     Microalbumin / creatinine urine ratio; Future  -     TSH, 3rd generation; Future  -     Vitamin D 25 hydroxy; Future    Mixed hyperlipidemia  -     CBC and differential; Future  -     Comprehensive metabolic panel; Future  -     Hemoglobin A1C; Future  -     Lipid Panel with Direct LDL reflex; Future  -     Microalbumin / creatinine urine ratio; Future  -     TSH, 3rd generation; Future  -     Vitamin D 25 hydroxy; Future    Atrial fibrillation, unspecified type (HCC)  -     CBC and differential; Future  -     Comprehensive metabolic panel; Future  -     Hemoglobin A1C; Future  -     Lipid Panel with Direct LDL reflex; Future  -     Microalbumin / creatinine urine ratio; Future  -     TSH, 3rd generation; Future  -     Vitamin D 25 hydroxy; Future    Health care maintenance    Rectal bleeding  -     Ambulatory referral to Colorectal Surgery; Future  -     lactulose 20 g/30 mL;  Take 15 mL (10 g total) by mouth 2 (two) times a day              Subjective:      Patient ID: Dwight Hickey is a 80 y o  female  CC:    Chief Complaint   Patient presents with    Follow-up    Rectal Bleeding     Pt states she would like to discuss this issue that has been ongoing for "awhile"     Constipation    Medicare Wellness Visit       HPI:     HPI:  This is an 42-year-old female who presents to the office for follow-up of chronic health conditions including type 2 diabetes, hypertension, and hyperlipidemia  She is also here for annual Medicare wellness visit today and complains of some rectal bleeding  Rectal bleeding has been going on for several weeks if not months  She feels that this is related to hemorrhoids and has been bright red blood  She has previously seen Dr Magdiel Tam for these and is wondering if it is time to go back  She does feel that it may be contributed to in part from her constipation  She feels her constipation may be in part from her tramadol however she did try cutting back on the medication and it did not seem to help alleviate her symptoms  She has been using stool softeners which have not really helps in softening the stool  She still has some pain and feels that the bowel movements are hard  Her diabetes has been well controlled with metformin  She has had recent labs which she is here to discuss  Her blood pressure has been stable on atenolol, losartan, and hydrochlorothiazide  She does have a history of mechanical mitral valve and continues with lifelong Coumadin therapy  The following portions of the patient's history were reviewed and updated as appropriate: allergies, current medications, past family history, past medical history, past social history, past surgical history and problem list       Review of Systems   Constitutional: Negative for chills, fatigue and fever  HENT: Negative for congestion, ear pain and sinus pressure  Eyes: Negative for visual disturbance     Respiratory: Negative for cough, chest tightness and shortness of breath  Cardiovascular: Negative for chest pain and palpitations  Gastrointestinal: Negative for diarrhea, nausea and vomiting  Endocrine: Negative for polyuria  Genitourinary: Negative for dysuria and frequency  Bright red blood per rectum after bowel movements  Musculoskeletal: Negative for arthralgias and myalgias  Skin: Negative for pallor and rash  Neurological: Negative for dizziness, weakness, light-headedness, numbness and headaches  Psychiatric/Behavioral: Negative for agitation, behavioral problems and sleep disturbance  All other systems reviewed and are negative  Data to review:       Objective:    Vitals:    02/06/20 1056   BP: 122/62   BP Location: Left arm   Patient Position: Sitting   Cuff Size: Adult   Pulse: 60   Resp: 14   Temp: (!) 97 3 °F (36 3 °C)   TempSrc: Tympanic   Weight: 73 6 kg (162 lb 3 2 oz)   Height: 5' 4" (1 626 m)        Physical Exam   Constitutional: She is oriented to person, place, and time  She appears well-developed and well-nourished  No distress  HENT:   Head: Normocephalic and atraumatic  Right Ear: External ear normal    Left Ear: External ear normal    Nose: Nose normal    Mouth/Throat: Oropharynx is clear and moist  No oropharyngeal exudate  Eyes: Pupils are equal, round, and reactive to light  Conjunctivae and EOM are normal    Neck: Normal range of motion  Neck supple  No tracheal deviation present  No thyromegaly present  Cardiovascular: Normal rate, regular rhythm and normal heart sounds  Exam reveals no friction rub  No murmur heard  Pulses:       Dorsalis pedis pulses are 2+ on the right side, and 2+ on the left side  Posterior tibial pulses are 2+ on the right side, and 2+ on the left side  Pulmonary/Chest: Effort normal and breath sounds normal  No respiratory distress  She has no wheezes  She has no rales  Abdominal: Soft   Bowel sounds are normal  She exhibits no distension  There is no tenderness  There is no rebound and no guarding  Musculoskeletal: Normal range of motion  She exhibits edema  She exhibits no tenderness  Trace bilateral lower extremity edema to the mid tibia  Feet:   Right Foot:   Skin Integrity: Negative for ulcer, skin breakdown, erythema, warmth, callus or dry skin  Left Foot:   Skin Integrity: Negative for ulcer, skin breakdown, erythema, warmth, callus or dry skin  Lymphadenopathy:     She has no cervical adenopathy  Neurological: She is alert and oriented to person, place, and time  No cranial nerve deficit  Coordination normal    Skin: Skin is warm and dry  No rash noted  No erythema  Psychiatric: She has a normal mood and affect  Her behavior is normal  Thought content normal    Nursing note and vitals reviewed  BMI Counseling: Body mass index is 27 84 kg/m²  The BMI is above normal  Nutrition recommendations include decreasing portion sizes  Exercise recommendations include exercising 3-5 times per week  Diabetic Foot Exam    Patient's shoes and socks removed  Right Foot/Ankle   Right Foot Inspection  Skin Exam: skin normal and skin intact no dry skin, no warmth, no callus, no erythema, no maceration, no abnormal color, no pre-ulcer, no ulcer and no callus                          Toe Exam: ROM and strength within normal limits  Sensory   Vibration: intact  Proprioception: intact   Monofilament testing: intact  Vascular  Capillary refills: < 3 seconds  The right DP pulse is 2+  The right PT pulse is 2+     Right Toe  - Comprehensive Exam  Ecchymosis: none  Arch: normal  Hammertoes: absent  Claw Toes: absent  Swelling: none   Tenderness: none         Left Foot/Ankle  Left Foot Inspection  Skin Exam: skin normal and skin intactno dry skin, no warmth, no erythema, no maceration, normal color, no pre-ulcer, no ulcer and no callus                         Toe Exam: ROM and strength within normal limits Sensory   Vibration: intact  Proprioception: intact  Monofilament: intact  Vascular  Capillary refills: < 3 seconds  The left DP pulse is 2+  The left PT pulse is 2+  Left Toe  - Comprehensive Exam  Ecchymosis: none  Arch: normal  Hammertoes: absent  Claw toes: absent  Swelling: none   Tenderness: none       Assign Risk Category:  No deformity present;  No loss of protective sensation;        Risk: 0

## 2020-02-06 ENCOUNTER — OFFICE VISIT (OUTPATIENT)
Dept: FAMILY MEDICINE CLINIC | Facility: CLINIC | Age: 85
End: 2020-02-06
Payer: MEDICARE

## 2020-02-06 VITALS
DIASTOLIC BLOOD PRESSURE: 62 MMHG | WEIGHT: 162.2 LBS | TEMPERATURE: 97.3 F | SYSTOLIC BLOOD PRESSURE: 122 MMHG | HEART RATE: 60 BPM | HEIGHT: 64 IN | RESPIRATION RATE: 14 BRPM | BODY MASS INDEX: 27.69 KG/M2

## 2020-02-06 DIAGNOSIS — R80.9 MICROALBUMINURIA: ICD-10-CM

## 2020-02-06 DIAGNOSIS — E11.29 TYPE 2 DIABETES MELLITUS WITH MICROALBUMINURIA, WITHOUT LONG-TERM CURRENT USE OF INSULIN (HCC): Primary | ICD-10-CM

## 2020-02-06 DIAGNOSIS — K62.5 RECTAL BLEEDING: ICD-10-CM

## 2020-02-06 DIAGNOSIS — E55.9 VITAMIN D DEFICIENCY: ICD-10-CM

## 2020-02-06 DIAGNOSIS — I10 ESSENTIAL HYPERTENSION: ICD-10-CM

## 2020-02-06 DIAGNOSIS — I50.9 CHRONIC CONGESTIVE HEART FAILURE, UNSPECIFIED HEART FAILURE TYPE (HCC): ICD-10-CM

## 2020-02-06 DIAGNOSIS — Z00.00 HEALTH CARE MAINTENANCE: ICD-10-CM

## 2020-02-06 DIAGNOSIS — I48.91 ATRIAL FIBRILLATION, UNSPECIFIED TYPE (HCC): ICD-10-CM

## 2020-02-06 DIAGNOSIS — R80.9 TYPE 2 DIABETES MELLITUS WITH MICROALBUMINURIA, WITHOUT LONG-TERM CURRENT USE OF INSULIN (HCC): Primary | ICD-10-CM

## 2020-02-06 DIAGNOSIS — E78.2 MIXED HYPERLIPIDEMIA: ICD-10-CM

## 2020-02-06 DIAGNOSIS — M46.1 SACROILIITIS (HCC): ICD-10-CM

## 2020-02-06 PROCEDURE — 1160F RVW MEDS BY RX/DR IN RCRD: CPT | Performed by: PHYSICIAN ASSISTANT

## 2020-02-06 PROCEDURE — 1123F ACP DISCUSS/DSCN MKR DOCD: CPT | Performed by: PHYSICIAN ASSISTANT

## 2020-02-06 PROCEDURE — 3044F HG A1C LEVEL LT 7.0%: CPT | Performed by: PHYSICIAN ASSISTANT

## 2020-02-06 PROCEDURE — 4040F PNEUMOC VAC/ADMIN/RCVD: CPT | Performed by: PHYSICIAN ASSISTANT

## 2020-02-06 PROCEDURE — 3060F POS MICROALBUMINURIA REV: CPT | Performed by: PHYSICIAN ASSISTANT

## 2020-02-06 PROCEDURE — 1036F TOBACCO NON-USER: CPT | Performed by: PHYSICIAN ASSISTANT

## 2020-02-06 PROCEDURE — 99214 OFFICE O/P EST MOD 30 MIN: CPT | Performed by: PHYSICIAN ASSISTANT

## 2020-02-06 PROCEDURE — 3008F BODY MASS INDEX DOCD: CPT | Performed by: PHYSICIAN ASSISTANT

## 2020-02-06 PROCEDURE — 3066F NEPHROPATHY DOC TX: CPT | Performed by: PHYSICIAN ASSISTANT

## 2020-02-06 PROCEDURE — 3078F DIAST BP <80 MM HG: CPT | Performed by: PHYSICIAN ASSISTANT

## 2020-02-06 PROCEDURE — 3074F SYST BP LT 130 MM HG: CPT | Performed by: PHYSICIAN ASSISTANT

## 2020-02-06 PROCEDURE — 1170F FXNL STATUS ASSESSED: CPT | Performed by: PHYSICIAN ASSISTANT

## 2020-02-06 PROCEDURE — G0439 PPPS, SUBSEQ VISIT: HCPCS | Performed by: PHYSICIAN ASSISTANT

## 2020-02-06 PROCEDURE — 2022F DILAT RTA XM EVC RTNOPTHY: CPT | Performed by: PHYSICIAN ASSISTANT

## 2020-02-06 PROCEDURE — 1125F AMNT PAIN NOTED PAIN PRSNT: CPT | Performed by: PHYSICIAN ASSISTANT

## 2020-02-06 RX ORDER — LACTULOSE 20 G/30ML
10 SOLUTION ORAL 2 TIMES DAILY
Qty: 480 ML | Refills: 0 | Status: SHIPPED | OUTPATIENT
Start: 2020-02-06 | End: 2020-09-09 | Stop reason: ALTCHOICE

## 2020-02-06 NOTE — PATIENT INSTRUCTIONS
1  Type 2 diabetes -presently stable with hemoglobin A1c of 6 2 on metformin therapy  Diabetic foot exam was performed today  2  Hypertension-presently stable with atenolol, losartan, hydrochlorothiazide  3   Hyperlipidemia -presently stable on statin therapy  Blood work was reviewed in detail with the patient  4  Rectal bleeding- this has been long-term and associated with hemorrhoids previously  Would recommend referral  Back to Dr Radha Marmolejo  In the meantime she will be given lactulose twice daily for constipation  5   Health care maintenance-annual Medicare wellness visit completed today    See separate note

## 2020-02-12 ENCOUNTER — ANTICOAG VISIT (OUTPATIENT)
Dept: CARDIOLOGY CLINIC | Facility: CLINIC | Age: 85
End: 2020-02-12

## 2020-02-12 ENCOUNTER — APPOINTMENT (OUTPATIENT)
Dept: LAB | Facility: CLINIC | Age: 85
End: 2020-02-12
Payer: MEDICARE

## 2020-02-12 DIAGNOSIS — Z95.2 HISTORY OF MITRAL VALVE REPLACEMENT: ICD-10-CM

## 2020-02-12 DIAGNOSIS — Z79.891 LONG-TERM CURRENT USE OF OPIATE ANALGESIC: ICD-10-CM

## 2020-02-12 NOTE — PROGRESS NOTES
Pt called same dose recheck in 2 weeks  Pt started Laxtulose from PCP almost complete with rx BID it is for constipation  She will continue same dose coumadin and recheck in 2 weeks

## 2020-02-17 DIAGNOSIS — E11.9 TYPE 2 DIABETES MELLITUS WITHOUT COMPLICATION, WITHOUT LONG-TERM CURRENT USE OF INSULIN (HCC): ICD-10-CM

## 2020-02-17 RX ORDER — METFORMIN HYDROCHLORIDE 500 MG/1
TABLET, EXTENDED RELEASE ORAL
Qty: 90 TABLET | Refills: 0 | Status: SHIPPED | OUTPATIENT
Start: 2020-02-17 | End: 2020-04-21

## 2020-02-19 ENCOUNTER — APPOINTMENT (OUTPATIENT)
Dept: LAB | Facility: CLINIC | Age: 85
End: 2020-02-19
Payer: MEDICARE

## 2020-02-19 ENCOUNTER — ANTICOAG VISIT (OUTPATIENT)
Dept: CARDIOLOGY CLINIC | Facility: CLINIC | Age: 85
End: 2020-02-19

## 2020-02-19 DIAGNOSIS — Z79.891 LONG-TERM CURRENT USE OF OPIATE ANALGESIC: ICD-10-CM

## 2020-02-19 DIAGNOSIS — Z95.2 HISTORY OF MITRAL VALVE REPLACEMENT: ICD-10-CM

## 2020-03-03 NOTE — PROGRESS NOTES
Detail Level: Zone REPORT NAME: Patient Visit Summary Report   VISIT DATE: 2/10/2017  VISIT TIME: 4:09 PM EST  PATIENT NAME: Elkin Cody  MEDICAL RECORD NUMBER: [id number]  SOCIAL SECURITY NUMBER:   YOB: 1934  AGE: 80  REFERRING PHYSICIAN: Bandar Ellison  SUPERVISING CLINICIAN: [address] CARE PROFESSIONAL: Camila Nixon  PATIENT HOME ADDRESS: [address]  PATIENT HOME PHONE: [phone]  DIAGNOSIS 1: Mitral Valve Replacement / 424 0  DIAGNOSIS 2:   DIAGNOSIS 3:   DIAGNOSIS 4:   INR RANGE: 2 5 - 3 5  INR GOAL: 3  TREATMENT START DATE:   TREATMENT END DATE:   NEXT VISIT: 2/14/2017  [facility]    VISIT RESULTS   ENCOUNTER NUMBER:   TEST LOCATION: [facility]  TEST TYPE: Outside Lab (Venipuncture)  VISIT TYPE: Phone Consult  CURRENT INR: 2 87 PROTIME: 29 5  SPECIMEN COL AND RPT DATE: 2/10/2017 4:09  PM EST    VITAL SIGNS  PULSE:  B/P:  WEIGHT:  HEIGHT:  TEMP:     CURRENT ANTICOAGULANT DOSING SCHEDULE  DOSE SIZE: 5mg    ANTICOAGULANT TYPE: WARFARIN  DOSING REGIMEN  Sun       Mon Tues Wed Thurs Fri       Sat  Total/Wk  5         5         0         0         0         2 5       2 5       15    PATIENT MEDICATION INSTRUCTION: Yes  PATIENT NUTRITIONAL COUNSELING: No  PATIENT BRUISING INSTRUCTION: No      LAST EDUCATION DATE:       PREVIOUS VISIT INFORMATION  VISITDATE   INR Goal  INR   Sun     Mon Tues Wed Thurs Fri  Sat     Total/wk  2/10/2017   3         2 87  5       5       0       0       0       2 5  2 5     15  2/8/2017    3         5 59  0       0       0       HOLD    HOLD    0  0       0  1/25/2017   3         2 26  5       5       5       5       5       5  5       35  1/17/2017   3         3 63  5       5       2 5     5       5       5  5       32 5    ADDITIONAL PREVIOUS VISIT INFORMATION  VISITDATE   PRIMARY RX               DOSE      CrCl  2/10/2017   WARFARIN                 5mg                 2/8/2017    WARFARIN 5mg                 1/25/2017   WARFARIN                 5mg                 1/17/2017   WARFARIN                 5mg                     OTHER CURRENT MEDICATIONS: WARFARIN, WARFARIN      PROGRESS NOTES:     PATIENT INSTRUCTIONS: 2/10/17, tc pt, per rp, 2 5 mg x 2 f/sat, then 5 mg  sun/mon, rech tues, 2/14  bharathi  TEST LOCATION: [facility]    Electronically signed by:  Yamila Robert on 2/10/2017 at 4:09 PM EST Detail Level: Detailed

## 2020-03-04 ENCOUNTER — OFFICE VISIT (OUTPATIENT)
Dept: CARDIOLOGY CLINIC | Facility: CLINIC | Age: 85
End: 2020-03-04
Payer: MEDICARE

## 2020-03-04 VITALS
HEART RATE: 58 BPM | SYSTOLIC BLOOD PRESSURE: 128 MMHG | WEIGHT: 158.8 LBS | BODY MASS INDEX: 27.26 KG/M2 | DIASTOLIC BLOOD PRESSURE: 68 MMHG

## 2020-03-04 DIAGNOSIS — Z95.2 H/O MITRAL VALVE REPLACEMENT WITH MECHANICAL VALVE: ICD-10-CM

## 2020-03-04 DIAGNOSIS — I10 BENIGN ESSENTIAL HTN: ICD-10-CM

## 2020-03-04 DIAGNOSIS — I05.9 MITRAL VALVE DISEASE: ICD-10-CM

## 2020-03-04 DIAGNOSIS — I48.19 OTHER PERSISTENT ATRIAL FIBRILLATION (HCC): Primary | ICD-10-CM

## 2020-03-04 DIAGNOSIS — E78.5 DYSLIPIDEMIA: ICD-10-CM

## 2020-03-04 PROCEDURE — 3044F HG A1C LEVEL LT 7.0%: CPT | Performed by: INTERNAL MEDICINE

## 2020-03-04 PROCEDURE — 3078F DIAST BP <80 MM HG: CPT | Performed by: INTERNAL MEDICINE

## 2020-03-04 PROCEDURE — 3060F POS MICROALBUMINURIA REV: CPT | Performed by: INTERNAL MEDICINE

## 2020-03-04 PROCEDURE — 3074F SYST BP LT 130 MM HG: CPT | Performed by: INTERNAL MEDICINE

## 2020-03-04 PROCEDURE — 1160F RVW MEDS BY RX/DR IN RCRD: CPT | Performed by: INTERNAL MEDICINE

## 2020-03-04 PROCEDURE — 1170F FXNL STATUS ASSESSED: CPT | Performed by: INTERNAL MEDICINE

## 2020-03-04 PROCEDURE — 99214 OFFICE O/P EST MOD 30 MIN: CPT | Performed by: INTERNAL MEDICINE

## 2020-03-04 PROCEDURE — 2022F DILAT RTA XM EVC RTNOPTHY: CPT | Performed by: INTERNAL MEDICINE

## 2020-03-04 PROCEDURE — 3066F NEPHROPATHY DOC TX: CPT | Performed by: INTERNAL MEDICINE

## 2020-03-04 PROCEDURE — 1036F TOBACCO NON-USER: CPT | Performed by: INTERNAL MEDICINE

## 2020-03-04 PROCEDURE — 4040F PNEUMOC VAC/ADMIN/RCVD: CPT | Performed by: INTERNAL MEDICINE

## 2020-03-04 PROCEDURE — 93000 ELECTROCARDIOGRAM COMPLETE: CPT | Performed by: INTERNAL MEDICINE

## 2020-03-04 NOTE — PROGRESS NOTES
Cardiology Follow Up        Terri Richard      1934      [id number]      Discussion/Summary:    1  History of mechanical mitral valve replacement, 1997, prior history of bioprosthesis in 1986 which later failed   2  Permanent atrial fibrillation  3  Benign essential hypertension  4  Trace lower extremity edema, stable  5  Dyslipidemia  6  Benign essential hypertension    · No significant murmur on examination, S1 click noted without systolic or diastolic murmur  Continue warfarin anticoagulation  Most recent INR was therapeutic  Antibiotic prophylaxis prior to any dental work with Lovenox bridging if any will warfarin interruption has been recommended  · Atrial fibrillation adequately rate controlled, continue atenolol and warfarin anticoagulation  · Blood pressure controlled, continue losartan//12 5 mg daily  · Prior lipid panel reviewed 01/2020, controlled with LDL cholesterol 72 mg/dL, continue pravastatin 40 mg daily      Follow-up in 6 months      Interval History:      This is a very pleasant 70-year-old female with a 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 prolapse, who underwent mitral valve replacement with a bioprosthesis   This failed about 10 years later and in 1997 she received a [place] mechanical mitral valve prosthesis  Misti Mendez has a history of permanent atrial fibrillation discovered on Holter monitoring in 2009, was followed Dr Мария Ayala at [place] for many years then switched over to our office  Misti Mendez has a 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, possibly residual surgical material     Prior echocardiogram 01/2018 revealed ejection fraction 60% with normally functioning mechanical mitral prosthesis, as well as a known ill-defined mobile echodensity at the base of the left and non coronary cusps which may be remnant surgical material     She presents today for follow-up with no complaints other than chronic back pain  She denies exertional chest pain, shortness of breath, lower extremity edema, palpitations, dizziness  She denies abnormal bleeding or heavy bruising while on warfarin and has had no falls  Vitals:  Vitals:    03/04/20 1014   BP: 128/68   BP Location: Right arm   Patient Position: Sitting   Cuff Size: Large   Pulse: 58   Weight: 72 kg (158 lb 12 8 oz)         Past Medical History:   Diagnosis Date    Arthritis     Benign polyp of large intestine     Chronic pain disorder     bilateral back - last assessed 12/27/17    [facility]     Endocarditis     Hypertension     Hypertension     Spinal stenosis     Stroke syndrome 1985     Social History     Socioeconomic History    Marital status:       Spouse name: Not on file    Number of children: 2    Years of education: Not on file    Highest education level: Not on file   Occupational History    Occupation: Retired   Social Needs    Financial resource strain: Not on file    Food insecurity:     Worry: Not on file     Inability: Not on file   [employer] needs:     Medical: Not on file     Non-medical: Not on file   Tobacco Use    Smoking status: Never Smoker    Smokeless tobacco: Never Used   Substance and Sexual Activity    Alcohol use: No    Drug use: No    Sexual activity: Not on file   Lifestyle    Physical activity:     Days per week: Not on file     Minutes per session: Not on file    Stress: Not on file   Relationships    Social connections:     Talks on phone: Not on file     Gets together: Not on file     Attends Gnosticism service: Not on file     Active member of club or organization: Not on file     Attends meetings of clubs or organizations: Not on file     Relationship status: Not on file    Intimate partner violence:     Fear of current or ex partner: Not on file     Emotionally abused: Not on file     Physically abused: Not on file     Forced sexual activity: Not on file   Other Topics Concern    Not on file   Social History Narrative    Not on file      Family History   Problem Relation Age of Onset    Clotting disorder Mother         disorder of blood and blood forming organs    Heart attack Father     Cancer Maternal Grandfather         doesnt know age   [place] Heart disease Paternal Grandfather     Diabetes Family      Past Surgical History:   Procedure Laterality Date    BACK SURGERY      CARPAL TUNNEL RELEASE Left     CATARACT EXTRACTION, BILATERAL  2011    CHOLECYSTECTOMY      FOOT SURGERY Right     GALLBLADDER SURGERY      LUMBAR LAMINECTOMY Right 11/08/2012    L3-4 min   invasive far lateral microdiscectomy for decompression of nerve root, transforaminal epidural steroid injection; operating room microscope for microdissection    MITRAL VALVE REPLACEMENT      biolical valve failed [address] [place] N/A 3/17/2017    Procedure: REPAIR HERNIA UMBILICAL;  Surgeon: Divya Dwyer DO;  Location: [facility];  Service:     VALVE REPLACEMENT         Current Outpatient Medications:     atenolol (TENORMIN) 50 mg tablet, TAKE 1 TABLET EVERY DAY, Disp: 90 tablet, Rfl: 0    gabapentin (NEURONTIN) 300 mg capsule, Take 2 capsules (600 mg total) by mouth 3 (three) times a day for 90 days 6 tablets daily, Disp: 540 capsule, Rfl: 0    lactulose 20 g/30 mL, Take 15 mL (10 g total) by mouth 2 (two) times a day, Disp: 480 mL, Rfl: 0    losartan-hydrochlorothiazide (HYZAAR) 100-12 5 MG per tablet, TAKE 1 TABLET EVERY DAY, Disp: 90 tablet, Rfl: 0    metFORMIN (GLUCOPHAGE-XR) 500 mg 24 hr tablet, TAKE 1 TABLET EVERY DAY, Disp: 90 tablet, Rfl: 0    pravastatin (PRAVACHOL) 40 mg tablet, TAKE 1 TABLET EVERY DAY, Disp: 90 tablet, Rfl: 3    warfarin (COUMADIN) 5 mg tablet, TAKE 1 TABLET (5 MG TOTAL) DAILY, Disp: 90 tablet, Rfl: 3    traMADol (ULTRAM) 50 mg tablet, Take 1 tablet (50 mg total) by mouth daily as needed for moderate pain (Patient not taking: Reported on 2/6/2020), Disp: 30 tablet, Rfl: 0        Review of Systems:  Review of Systems   Constitutional: Negative for activity change, fever and unexpected weight change  HENT: Negative for facial swelling, nosebleeds and voice change  Respiratory: Negative for chest tightness, shortness of breath and wheezing  Cardiovascular: Negative for chest pain, palpitations and leg swelling  Gastrointestinal: Negative for abdominal distention  Genitourinary: Negative for hematuria  Musculoskeletal: Negative for arthralgias  Skin: Negative for color change, pallor, rash and wound  Neurological: Negative for dizziness, seizures and syncope  Psychiatric/Behavioral: Negative for agitation  Physical Exam:  Physical Exam   Constitutional: She is oriented to person, place, and time  She appears well-developed and well-nourished  HENT:   Head: Normocephalic and atraumatic  Eyes: EOM are normal    Neck: Normal range of motion  Neck supple  Cardiovascular: Normal rate, normal heart sounds and intact distal pulses  Irregularly irregular rhythm   Pulmonary/Chest: Effort normal and breath sounds normal    Abdominal: Soft  Musculoskeletal: Normal range of motion  Neurological: She is alert and oriented to person, place, and time  Skin: Skin is warm and dry  Psychiatric: She has a normal mood and affect  Her behavior is normal  Judgment and thought content normal    Vitals reviewed  This note was completed in part utilizing M-Modal Fluency Direct Software  Grammatical errors, random word insertions, spelling mistakes, and incomplete sentences can be an occasional consequence of this system secondary to software limitations, ambient noise, and hardware issues  If you have any questions or concerns about the content, text, or information contained within the body of this dictation, please contact the provider for clarification

## 2020-03-10 ENCOUNTER — APPOINTMENT (OUTPATIENT)
Dept: LAB | Facility: CLINIC | Age: 85
End: 2020-03-10
Payer: MEDICARE

## 2020-03-10 ENCOUNTER — ANTICOAG VISIT (OUTPATIENT)
Dept: CARDIOLOGY CLINIC | Facility: CLINIC | Age: 85
End: 2020-03-10

## 2020-03-10 DIAGNOSIS — Z79.891 LONG-TERM CURRENT USE OF OPIATE ANALGESIC: ICD-10-CM

## 2020-03-10 DIAGNOSIS — Z95.2 HISTORY OF MITRAL VALVE REPLACEMENT: ICD-10-CM

## 2020-03-27 DIAGNOSIS — I10 ESSENTIAL HYPERTENSION: ICD-10-CM

## 2020-03-28 RX ORDER — LOSARTAN POTASSIUM AND HYDROCHLOROTHIAZIDE 12.5; 1 MG/1; MG/1
TABLET ORAL
Qty: 90 TABLET | Refills: 0 | Status: SHIPPED | OUTPATIENT
Start: 2020-03-28 | End: 2020-05-26

## 2020-04-07 ENCOUNTER — TRANSCRIBE ORDERS (OUTPATIENT)
Dept: LAB | Facility: HOSPITAL | Age: 85
End: 2020-04-07

## 2020-04-07 ENCOUNTER — TELEPHONE (OUTPATIENT)
Dept: LAB | Facility: HOSPITAL | Age: 85
End: 2020-04-07

## 2020-04-07 DIAGNOSIS — I48.91 ATRIAL FIBRILLATION, UNSPECIFIED TYPE (HCC): Primary | ICD-10-CM

## 2020-04-07 NOTE — PROGRESS NOTES
Phone call from patient, will call to [facility] lab now to arrange for home phlebotomy

## 2020-04-08 ENCOUNTER — APPOINTMENT (OUTPATIENT)
Dept: LAB | Facility: HOSPITAL | Age: 85
End: 2020-04-08
Attending: INTERNAL MEDICINE
Payer: MEDICARE

## 2020-04-08 ENCOUNTER — ANTICOAG VISIT (OUTPATIENT)
Dept: CARDIOLOGY CLINIC | Facility: CLINIC | Age: 85
End: 2020-04-08

## 2020-04-08 DIAGNOSIS — Z79.891 LONG-TERM CURRENT USE OF OPIATE ANALGESIC: ICD-10-CM

## 2020-04-08 DIAGNOSIS — Z95.2 HISTORY OF MITRAL VALVE REPLACEMENT: ICD-10-CM

## 2020-04-08 DIAGNOSIS — I48.91 ATRIAL FIBRILLATION, UNSPECIFIED TYPE (HCC): ICD-10-CM

## 2020-04-08 LAB — VENIPUNCTURE: NORMAL

## 2020-04-20 DIAGNOSIS — E11.9 TYPE 2 DIABETES MELLITUS WITHOUT COMPLICATION, WITHOUT LONG-TERM CURRENT USE OF INSULIN (HCC): ICD-10-CM

## 2020-04-21 RX ORDER — METFORMIN HYDROCHLORIDE 500 MG/1
TABLET, EXTENDED RELEASE ORAL
Qty: 90 TABLET | Refills: 0 | Status: SHIPPED | OUTPATIENT
Start: 2020-04-21 | End: 2020-06-18

## 2020-05-01 ENCOUNTER — TELEPHONE (OUTPATIENT)
Dept: LAB | Facility: HOSPITAL | Age: 85
End: 2020-05-01

## 2020-05-05 ENCOUNTER — APPOINTMENT (OUTPATIENT)
Dept: LAB | Facility: HOSPITAL | Age: 85
End: 2020-05-05
Payer: MEDICARE

## 2020-05-05 ENCOUNTER — ANTICOAG VISIT (OUTPATIENT)
Dept: CARDIOLOGY CLINIC | Facility: CLINIC | Age: 85
End: 2020-05-05

## 2020-05-05 DIAGNOSIS — Z95.2 HISTORY OF MITRAL VALVE REPLACEMENT: ICD-10-CM

## 2020-05-05 DIAGNOSIS — Z79.891 LONG-TERM CURRENT USE OF OPIATE ANALGESIC: ICD-10-CM

## 2020-05-22 DIAGNOSIS — I10 ESSENTIAL HYPERTENSION: ICD-10-CM

## 2020-05-26 ENCOUNTER — TELEPHONE (OUTPATIENT)
Dept: PAIN MEDICINE | Facility: MEDICAL CENTER | Age: 85
End: 2020-05-26

## 2020-05-26 RX ORDER — LOSARTAN POTASSIUM AND HYDROCHLOROTHIAZIDE 12.5; 1 MG/1; MG/1
TABLET ORAL
Qty: 90 TABLET | Refills: 0 | Status: SHIPPED | OUTPATIENT
Start: 2020-05-26 | End: 2020-08-02

## 2020-06-01 DIAGNOSIS — I48.91 ATRIAL FIBRILLATION, UNSPECIFIED TYPE (HCC): ICD-10-CM

## 2020-06-01 DIAGNOSIS — E78.2 MIXED HYPERLIPIDEMIA: ICD-10-CM

## 2020-06-01 RX ORDER — WARFARIN SODIUM 5 MG/1
TABLET ORAL
Qty: 90 TABLET | Refills: 3 | Status: SHIPPED | OUTPATIENT
Start: 2020-06-01

## 2020-06-01 RX ORDER — PRAVASTATIN SODIUM 40 MG
TABLET ORAL
Qty: 90 TABLET | Refills: 3 | Status: SHIPPED | OUTPATIENT
Start: 2020-06-01

## 2020-06-03 ENCOUNTER — ANTICOAG VISIT (OUTPATIENT)
Dept: CARDIOLOGY CLINIC | Facility: CLINIC | Age: 85
End: 2020-06-03

## 2020-06-03 ENCOUNTER — APPOINTMENT (OUTPATIENT)
Dept: LAB | Facility: CLINIC | Age: 85
End: 2020-06-03
Payer: MEDICARE

## 2020-06-03 DIAGNOSIS — Z95.2 HISTORY OF MITRAL VALVE REPLACEMENT: ICD-10-CM

## 2020-06-03 DIAGNOSIS — Z79.891 LONG-TERM CURRENT USE OF OPIATE ANALGESIC: ICD-10-CM

## 2020-06-12 DIAGNOSIS — I10 HYPERTENSION, UNSPECIFIED TYPE: ICD-10-CM

## 2020-06-15 RX ORDER — ATENOLOL 50 MG/1
TABLET ORAL
Qty: 90 TABLET | Refills: 0 | Status: SHIPPED | OUTPATIENT
Start: 2020-06-15 | End: 2020-08-05

## 2020-06-18 DIAGNOSIS — E11.9 TYPE 2 DIABETES MELLITUS WITHOUT COMPLICATION, WITHOUT LONG-TERM CURRENT USE OF INSULIN (HCC): ICD-10-CM

## 2020-06-18 RX ORDER — METFORMIN HYDROCHLORIDE 500 MG/1
TABLET, EXTENDED RELEASE ORAL
Qty: 90 TABLET | Refills: 0 | Status: SHIPPED | OUTPATIENT
Start: 2020-06-18 | End: 2020-08-27

## 2020-06-24 ENCOUNTER — APPOINTMENT (OUTPATIENT)
Dept: LAB | Facility: CLINIC | Age: 85
End: 2020-06-24
Payer: MEDICARE

## 2020-06-25 ENCOUNTER — ANTICOAG VISIT (OUTPATIENT)
Dept: CARDIOLOGY CLINIC | Facility: CLINIC | Age: 85
End: 2020-06-25

## 2020-06-25 DIAGNOSIS — Z95.2 HISTORY OF MITRAL VALVE REPLACEMENT: ICD-10-CM

## 2020-06-25 DIAGNOSIS — Z79.891 LONG-TERM CURRENT USE OF OPIATE ANALGESIC: ICD-10-CM

## 2020-06-25 DIAGNOSIS — I48.91 ATRIAL FIBRILLATION, UNSPECIFIED TYPE (HCC): ICD-10-CM

## 2020-07-01 ENCOUNTER — APPOINTMENT (OUTPATIENT)
Dept: LAB | Facility: CLINIC | Age: 85
End: 2020-07-01
Payer: COMMERCIAL

## 2020-07-02 ENCOUNTER — ANTICOAG VISIT (OUTPATIENT)
Dept: CARDIOLOGY CLINIC | Facility: CLINIC | Age: 85
End: 2020-07-02

## 2020-07-02 DIAGNOSIS — I48.91 ATRIAL FIBRILLATION, UNSPECIFIED TYPE (HCC): ICD-10-CM

## 2020-07-02 DIAGNOSIS — Z95.2 HISTORY OF MITRAL VALVE REPLACEMENT: ICD-10-CM

## 2020-07-02 DIAGNOSIS — Z79.891 LONG-TERM CURRENT USE OF OPIATE ANALGESIC: ICD-10-CM

## 2020-07-02 NOTE — PROGRESS NOTES
Tc to pt, denies any bleeding, will decrease dose, 5 mg sun/tues, fri, 2 5 mg other days of the week, inr due 1 week

## 2020-07-06 ENCOUNTER — HOSPITAL ENCOUNTER (OUTPATIENT)
Dept: MAMMOGRAPHY | Facility: MEDICAL CENTER | Age: 85
Discharge: HOME | End: 2020-07-06
Payer: COMMERCIAL

## 2020-07-06 ENCOUNTER — OFFICE VISIT (OUTPATIENT)
Dept: PAIN MEDICINE | Facility: MEDICAL CENTER | Age: 85
End: 2020-07-06
Payer: COMMERCIAL

## 2020-07-06 VITALS
WEIGHT: 154 LBS | BODY MASS INDEX: 26.29 KG/M2 | RESPIRATION RATE: 16 BRPM | SYSTOLIC BLOOD PRESSURE: 126 MMHG | DIASTOLIC BLOOD PRESSURE: 75 MMHG | HEART RATE: 73 BPM | TEMPERATURE: 99.2 F | HEIGHT: 64 IN

## 2020-07-06 DIAGNOSIS — M46.1 SACROILIITIS (HCC): ICD-10-CM

## 2020-07-06 DIAGNOSIS — G89.4 CHRONIC PAIN SYNDROME: ICD-10-CM

## 2020-07-06 DIAGNOSIS — Z12.31 VISIT FOR SCREENING MAMMOGRAM: ICD-10-CM

## 2020-07-06 DIAGNOSIS — M54.16 CHRONIC LUMBAR RADICULOPATHY: Primary | ICD-10-CM

## 2020-07-06 DIAGNOSIS — M79.18 MYOFASCIAL PAIN SYNDROME: ICD-10-CM

## 2020-07-06 PROCEDURE — 3074F SYST BP LT 130 MM HG: CPT | Performed by: NURSE PRACTITIONER

## 2020-07-06 PROCEDURE — 4040F PNEUMOC VAC/ADMIN/RCVD: CPT | Performed by: NURSE PRACTITIONER

## 2020-07-06 PROCEDURE — 3060F POS MICROALBUMINURIA REV: CPT | Performed by: NURSE PRACTITIONER

## 2020-07-06 PROCEDURE — 3008F BODY MASS INDEX DOCD: CPT | Performed by: NURSE PRACTITIONER

## 2020-07-06 PROCEDURE — 77067 SCR MAMMO BI INCL CAD: CPT

## 2020-07-06 PROCEDURE — 2022F DILAT RTA XM EVC RTNOPTHY: CPT | Performed by: NURSE PRACTITIONER

## 2020-07-06 PROCEDURE — 3078F DIAST BP <80 MM HG: CPT | Performed by: NURSE PRACTITIONER

## 2020-07-06 PROCEDURE — 1160F RVW MEDS BY RX/DR IN RCRD: CPT | Performed by: NURSE PRACTITIONER

## 2020-07-06 PROCEDURE — 3066F NEPHROPATHY DOC TX: CPT | Performed by: NURSE PRACTITIONER

## 2020-07-06 PROCEDURE — 1036F TOBACCO NON-USER: CPT | Performed by: NURSE PRACTITIONER

## 2020-07-06 PROCEDURE — 3044F HG A1C LEVEL LT 7.0%: CPT | Performed by: NURSE PRACTITIONER

## 2020-07-06 PROCEDURE — 77063 BREAST TOMOSYNTHESIS BI: CPT

## 2020-07-06 PROCEDURE — 99213 OFFICE O/P EST LOW 20 MIN: CPT | Performed by: NURSE PRACTITIONER

## 2020-07-06 RX ORDER — GABAPENTIN 300 MG/1
600 CAPSULE ORAL 3 TIMES DAILY
Qty: 540 CAPSULE | Refills: 0 | Status: SHIPPED | OUTPATIENT
Start: 2020-07-06 | End: 2020-12-02

## 2020-07-06 NOTE — PROGRESS NOTES
Assessment  1  Chronic lumbar radiculopathy    2  Myofascial pain syndrome    3  Sacroiliitis ([address] )    4  Chronic pain syndrome        Plan  Continue with gabapentin this was refilled today  Continue with Tylenol  Patient has not been using tramadol because she has not been really going anywhere in she generally only uses the medication if she know she is going to be out for the day  She does not require refill  Follow-up in 3 months for medication refills  My impressions and treatment recommendations were discussed in detail with the patient who verbalized understanding and had no further questions  Discharge instructions were provided  I personally saw and examined the patient and I agree with the above discussed plan of care  No orders of the defined types were placed in this encounter  New Medications Ordered This Visit   Medications    gabapentin (NEURONTIN) 300 mg capsule     Sig: Take 2 capsules (600 mg total) by mouth 3 (three) times a day 6 tablets daily     Dispense:  540 capsule     Refill:  0       History of Present Illness    Simi Bhakta is a 80 y o  female presents for follow-up related to her chronic low back pain  She rates her pain 8/10 this is constant most bothersome the morning  She describes the pain as cramping, pressure-like, and shooting  Patient has been using gabapentin 600 mg 3 times daily she also uses Tylenol arthritis as needed  Patient does have Tramadol she only uses this 1 tablet daily as needed  She tells me she has been taking a very long time because she has not been going anywhere due to COVID-19  She was also notes constipation as a side effect from the medication she went to a specialist and was put on lactulose she takes that every other day  I have personally reviewed and/or updated the patient's past medical history, past surgical history, family history, social history, current medications, allergies, and vital signs today  Review of Systems   Respiratory: Negative for shortness of breath  Cardiovascular: Negative for chest pain  Gastrointestinal: Positive for constipation  Negative for diarrhea, nausea and vomiting  Musculoskeletal: Positive for back pain, gait problem, joint swelling (feet) and myalgias  Negative for arthralgias  Arms and shoulders   Skin: Negative for rash  Neurological: Negative for dizziness, seizures and weakness  All other systems reviewed and are negative  Patient Active Problem List   Diagnosis    Arthritis    Atrial fibrillation (HCC)    Chronic congestive heart failure (HCC)    Chronic lumbar radiculopathy    Coagulation defect (HCC)    Hemorrhoids    History of mitral valve replacement    Hyperlipidemia    Hypertension    Myofascial pain syndrome    Osteopenia    Chronic pain syndrome    Sacroiliitis (HCC)    Type 2 diabetes mellitus ([facility] )    Vitamin D deficiency    Long-term current use of opiate analgesic    Microalbuminuria       Past Medical History:   Diagnosis Date    Arthritis     Benign polyp of large intestine     Chronic pain disorder     bilateral back - last assessed 12/27/17    Diabetes mellitus ([facility] )     Endocarditis     Hx of skin cancer, basal cell     Hypertension     Hypertension     Joint pain     Low back pain     Spinal stenosis     Stroke syndrome 1985       Past Surgical History:   Procedure Laterality Date    BACK SURGERY      CARPAL TUNNEL RELEASE Left     CATARACT EXTRACTION, BILATERAL  2011    CHOLECYSTECTOMY      FOOT SURGERY Right     GALLBLADDER SURGERY      LAMINECTOMY      LUMBAR LAMINECTOMY Right 11/08/2012    L3-4 min   invasive far lateral microdiscectomy for decompression of nerve root, transforaminal epidural steroid injection; operating room microscope for microdissection    MITRAL VALVE REPLACEMENT      biolical valve failed [address] [place] [address] UMBILICAL HERNIA REPAIR N/A 3/17/2017    Procedure: REPAIR HERNIA UMBILICAL;  Surgeon: Anais Azar DO;  [facility];  Service:     VALVE REPLACEMENT         Family History   Problem Relation Age of Onset    Clotting disorder Mother         disorder of blood and blood forming organs    Heart attack Father     Cancer Maternal Grandfather         doesnt know age   [de-identified] Heart disease Paternal Grandfather     Diabetes Family        Social History     Occupational History    Occupation: Retired   Tobacco Use    Smoking status: Never Smoker    Smokeless tobacco: Never Used   Substance and Sexual Activity    Alcohol use: No    Drug use: No    Sexual activity: Not on file       Current Outpatient Medications on File Prior to Visit   Medication Sig    atenolol (TENORMIN) 50 mg tablet TAKE 1 TABLET EVERY DAY    losartan-hydrochlorothiazide (HYZAAR) 100-12 5 MG per tablet TAKE 1 TABLET EVERY DAY    metFORMIN (GLUCOPHAGE-XR) 500 mg 24 hr tablet TAKE 1 TABLET EVERY DAY    pravastatin (PRAVACHOL) 40 mg tablet TAKE 1 TABLET EVERY DAY    warfarin (COUMADIN) 5 mg tablet TAKE 1 TABLET (5 MG TOTAL) DAILY    [DISCONTINUED] gabapentin (NEURONTIN) 300 mg capsule Take 2 capsules (600 mg total) by mouth 3 (three) times a day for 90 days 6 tablets daily    lactulose 20 g/30 mL Take 15 mL (10 g total) by mouth 2 (two) times a day (Patient not taking: Reported on 7/6/2020)    traMADol (ULTRAM) 50 mg tablet Take 1 tablet (50 mg total) by mouth daily as needed for moderate pain (Patient not taking: Reported on 2/6/2020)     No current facility-administered medications on file prior to visit          Allergies   Allergen Reactions    Lisinopril      cough       Physical Exam    /75   Pulse 73   Temp 99 2 °F (37 3 °C) (Temporal)   Resp 16   Ht 5' 4" (1 626 m)   Wt 69 9 kg (154 lb)   BMI 26 43 kg/m²     Constitutional: normal, well developed, well nourished, alert, in no distress and non-toxic and no overt pain behavior    Eyes: anicteric  HEENT: grossly intact  Neck: supple, symmetric, trachea midline and no masses   Pulmonary:even and unlabored  Cardiovascular:No edema or pitting edema present  Skin:Normal without rashes or lesions and well hydrated  Psychiatric:Mood and affect appropriate  Neurologic:Cranial Nerves II-XII grossly intact  Musculoskeletal:ambulates with cane    Imaging

## 2020-07-09 ENCOUNTER — APPOINTMENT (OUTPATIENT)
Dept: LAB | Facility: CLINIC | Age: 85
End: 2020-07-09
Payer: COMMERCIAL

## 2020-07-10 ENCOUNTER — ANTICOAG VISIT (OUTPATIENT)
Dept: CARDIOLOGY CLINIC | Facility: CLINIC | Age: 85
End: 2020-07-10

## 2020-07-10 DIAGNOSIS — Z79.891 LONG-TERM CURRENT USE OF OPIATE ANALGESIC: ICD-10-CM

## 2020-07-10 DIAGNOSIS — Z95.2 HISTORY OF MITRAL VALVE REPLACEMENT: ICD-10-CM

## 2020-07-10 DIAGNOSIS — I48.91 ATRIAL FIBRILLATION, UNSPECIFIED TYPE (HCC): ICD-10-CM

## 2020-07-23 ENCOUNTER — APPOINTMENT (OUTPATIENT)
Dept: LAB | Facility: CLINIC | Age: 85
End: 2020-07-23
Payer: COMMERCIAL

## 2020-07-23 DIAGNOSIS — E78.2 MIXED HYPERLIPIDEMIA: ICD-10-CM

## 2020-07-23 DIAGNOSIS — I10 ESSENTIAL HYPERTENSION: ICD-10-CM

## 2020-07-23 DIAGNOSIS — R80.9 TYPE 2 DIABETES MELLITUS WITH MICROALBUMINURIA, WITHOUT LONG-TERM CURRENT USE OF INSULIN (HCC): ICD-10-CM

## 2020-07-23 DIAGNOSIS — E55.9 VITAMIN D DEFICIENCY: ICD-10-CM

## 2020-07-23 DIAGNOSIS — R80.9 MICROALBUMINURIA: ICD-10-CM

## 2020-07-23 DIAGNOSIS — I50.9 CHRONIC CONGESTIVE HEART FAILURE, UNSPECIFIED HEART FAILURE TYPE (HCC): ICD-10-CM

## 2020-07-23 DIAGNOSIS — M46.1 SACROILIITIS (HCC): ICD-10-CM

## 2020-07-23 DIAGNOSIS — E11.29 TYPE 2 DIABETES MELLITUS WITH MICROALBUMINURIA, WITHOUT LONG-TERM CURRENT USE OF INSULIN (HCC): ICD-10-CM

## 2020-07-23 DIAGNOSIS — I48.91 ATRIAL FIBRILLATION, UNSPECIFIED TYPE (HCC): ICD-10-CM

## 2020-07-23 LAB
25(OH)D3 SERPL-MCNC: 33 NG/ML (ref 30–100)
ALBUMIN SERPL BCP-MCNC: 3.4 G/DL (ref 3.5–5)
ALP SERPL-CCNC: 48 U/L (ref 46–116)
ALT SERPL W P-5'-P-CCNC: 23 U/L (ref 12–78)
ANION GAP SERPL CALCULATED.3IONS-SCNC: 5 MMOL/L (ref 4–13)
AST SERPL W P-5'-P-CCNC: 20 U/L (ref 5–45)
BASOPHILS # BLD AUTO: 0.05 THOUSANDS/ΜL (ref 0–0.1)
BASOPHILS NFR BLD AUTO: 1 % (ref 0–1)
BILIRUB SERPL-MCNC: 0.78 MG/DL (ref 0.2–1)
BUN SERPL-MCNC: 31 MG/DL (ref 5–25)
CALCIUM SERPL-MCNC: 9.4 MG/DL (ref 8.3–10.1)
CHLORIDE SERPL-SCNC: 106 MMOL/L (ref 100–108)
CHOLEST SERPL-MCNC: 154 MG/DL (ref 50–200)
CO2 SERPL-SCNC: 30 MMOL/L (ref 21–32)
CREAT SERPL-MCNC: 0.82 MG/DL (ref 0.6–1.3)
CREAT UR-MCNC: 86.4 MG/DL
EOSINOPHIL # BLD AUTO: 0.1 THOUSAND/ΜL (ref 0–0.61)
EOSINOPHIL NFR BLD AUTO: 1 % (ref 0–6)
ERYTHROCYTE [DISTWIDTH] IN BLOOD BY AUTOMATED COUNT: 13.8 % (ref 11.6–15.1)
EST. AVERAGE GLUCOSE BLD GHB EST-MCNC: 117 MG/DL
GFR SERPL CREATININE-BSD FRML MDRD: 65 ML/MIN/1.73SQ M
GLUCOSE P FAST SERPL-MCNC: 112 MG/DL (ref 65–99)
HBA1C MFR BLD: 5.7 %
HCT VFR BLD AUTO: 41.9 % (ref 34.8–46.1)
HDLC SERPL-MCNC: 55 MG/DL
HGB BLD-MCNC: 13.2 G/DL (ref 11.5–15.4)
IMM GRANULOCYTES # BLD AUTO: 0.03 THOUSAND/UL (ref 0–0.2)
IMM GRANULOCYTES NFR BLD AUTO: 0 % (ref 0–2)
LDLC SERPL CALC-MCNC: 86 MG/DL (ref 0–100)
LYMPHOCYTES # BLD AUTO: 1.35 THOUSANDS/ΜL (ref 0.6–4.47)
LYMPHOCYTES NFR BLD AUTO: 17 % (ref 14–44)
MCH RBC QN AUTO: 30.2 PG (ref 26.8–34.3)
MCHC RBC AUTO-ENTMCNC: 31.5 G/DL (ref 31.4–37.4)
MCV RBC AUTO: 96 FL (ref 82–98)
MICROALBUMIN UR-MCNC: 11 MG/L (ref 0–20)
MICROALBUMIN/CREAT 24H UR: 13 MG/G CREATININE (ref 0–30)
MONOCYTES # BLD AUTO: 0.63 THOUSAND/ΜL (ref 0.17–1.22)
MONOCYTES NFR BLD AUTO: 8 % (ref 4–12)
NEUTROPHILS # BLD AUTO: 5.82 THOUSANDS/ΜL (ref 1.85–7.62)
NEUTS SEG NFR BLD AUTO: 73 % (ref 43–75)
NRBC BLD AUTO-RTO: 0 /100 WBCS
PLATELET # BLD AUTO: 279 THOUSANDS/UL (ref 149–390)
PMV BLD AUTO: 11.4 FL (ref 8.9–12.7)
POTASSIUM SERPL-SCNC: 4 MMOL/L (ref 3.5–5.3)
PROT SERPL-MCNC: 7.1 G/DL (ref 6.4–8.2)
RBC # BLD AUTO: 4.37 MILLION/UL (ref 3.81–5.12)
SODIUM SERPL-SCNC: 141 MMOL/L (ref 136–145)
TRIGL SERPL-MCNC: 65 MG/DL
TSH SERPL DL<=0.05 MIU/L-ACNC: 0.69 UIU/ML (ref 0.36–3.74)
WBC # BLD AUTO: 7.98 THOUSAND/UL (ref 4.31–10.16)

## 2020-07-23 PROCEDURE — 80053 COMPREHEN METABOLIC PANEL: CPT

## 2020-07-23 PROCEDURE — 85025 COMPLETE CBC W/AUTO DIFF WBC: CPT

## 2020-07-23 PROCEDURE — 83036 HEMOGLOBIN GLYCOSYLATED A1C: CPT

## 2020-07-23 PROCEDURE — 82306 VITAMIN D 25 HYDROXY: CPT

## 2020-07-23 PROCEDURE — 84443 ASSAY THYROID STIM HORMONE: CPT

## 2020-07-23 PROCEDURE — 82570 ASSAY OF URINE CREATININE: CPT

## 2020-07-23 PROCEDURE — 80061 LIPID PANEL: CPT

## 2020-07-23 PROCEDURE — 82043 UR ALBUMIN QUANTITATIVE: CPT

## 2020-07-24 ENCOUNTER — ANTICOAG VISIT (OUTPATIENT)
Dept: CARDIOLOGY CLINIC | Facility: CLINIC | Age: 85
End: 2020-07-24

## 2020-07-24 DIAGNOSIS — Z95.2 HISTORY OF MITRAL VALVE REPLACEMENT: ICD-10-CM

## 2020-07-24 DIAGNOSIS — Z79.891 LONG-TERM CURRENT USE OF OPIATE ANALGESIC: ICD-10-CM

## 2020-07-24 DIAGNOSIS — I48.91 ATRIAL FIBRILLATION, UNSPECIFIED TYPE (HCC): ICD-10-CM

## 2020-07-31 DIAGNOSIS — I10 ESSENTIAL HYPERTENSION: ICD-10-CM

## 2020-08-02 RX ORDER — LOSARTAN POTASSIUM AND HYDROCHLOROTHIAZIDE 12.5; 1 MG/1; MG/1
TABLET ORAL
Qty: 90 TABLET | Refills: 0 | Status: SHIPPED | OUTPATIENT
Start: 2020-08-02 | End: 2020-09-28

## 2020-08-05 DIAGNOSIS — I10 HYPERTENSION, UNSPECIFIED TYPE: ICD-10-CM

## 2020-08-05 RX ORDER — ATENOLOL 50 MG/1
TABLET ORAL
Qty: 90 TABLET | Refills: 0 | Status: SHIPPED | OUTPATIENT
Start: 2020-08-05 | End: 2020-10-01

## 2020-08-20 ENCOUNTER — APPOINTMENT (OUTPATIENT)
Dept: LAB | Facility: CLINIC | Age: 85
End: 2020-08-20
Payer: COMMERCIAL

## 2020-08-21 ENCOUNTER — ANTICOAG VISIT (OUTPATIENT)
Dept: CARDIOLOGY CLINIC | Facility: CLINIC | Age: 85
End: 2020-08-21

## 2020-08-21 DIAGNOSIS — I48.91 ATRIAL FIBRILLATION, UNSPECIFIED TYPE (HCC): ICD-10-CM

## 2020-08-21 DIAGNOSIS — Z79.891 LONG-TERM CURRENT USE OF OPIATE ANALGESIC: ICD-10-CM

## 2020-08-21 DIAGNOSIS — Z95.2 HISTORY OF MITRAL VALVE REPLACEMENT: ICD-10-CM

## 2020-08-21 NOTE — PROGRESS NOTES
Tc to pt, tried calling patient, unable to reach due to static on line , then call disconnects  Called to cell phone, lm on voice mail to call office regarding inr  Also called to daughter, left message on voice mail to continue current dose, inr due 2 weeks  Pt to call to verify message received

## 2020-08-24 NOTE — PROGRESS NOTES
Phone call from patient, reviewed instructions, reports she did not hear instructions    Will continue current dose, inr due 9/3  bharathi

## 2020-08-26 DIAGNOSIS — E11.9 TYPE 2 DIABETES MELLITUS WITHOUT COMPLICATION, WITHOUT LONG-TERM CURRENT USE OF INSULIN (HCC): ICD-10-CM

## 2020-08-27 RX ORDER — METFORMIN HYDROCHLORIDE 500 MG/1
TABLET, EXTENDED RELEASE ORAL
Qty: 90 TABLET | Refills: 0 | Status: SHIPPED | OUTPATIENT
Start: 2020-08-27 | End: 2020-11-11

## 2020-09-03 ENCOUNTER — ANTICOAG VISIT (OUTPATIENT)
Dept: CARDIOLOGY CLINIC | Facility: CLINIC | Age: 85
End: 2020-09-03

## 2020-09-03 ENCOUNTER — APPOINTMENT (OUTPATIENT)
Dept: LAB | Facility: CLINIC | Age: 85
End: 2020-09-03
Payer: COMMERCIAL

## 2020-09-03 DIAGNOSIS — I48.91 ATRIAL FIBRILLATION, UNSPECIFIED TYPE (HCC): ICD-10-CM

## 2020-09-03 DIAGNOSIS — Z79.891 LONG-TERM CURRENT USE OF OPIATE ANALGESIC: ICD-10-CM

## 2020-09-03 DIAGNOSIS — Z95.2 HISTORY OF MITRAL VALVE REPLACEMENT: ICD-10-CM

## 2020-09-03 NOTE — PROGRESS NOTES
Tc to pt, will hold x 1 today, then decrease dose, 5 mg sun/wed, 2 5 mg other days of the week, inr due 2 weeks  Denies any medication changes

## 2020-09-09 ENCOUNTER — OFFICE VISIT (OUTPATIENT)
Dept: FAMILY MEDICINE CLINIC | Facility: CLINIC | Age: 85
End: 2020-09-09
Payer: COMMERCIAL

## 2020-09-09 VITALS
WEIGHT: 151.8 LBS | TEMPERATURE: 98.7 F | HEIGHT: 64 IN | BODY MASS INDEX: 25.92 KG/M2 | DIASTOLIC BLOOD PRESSURE: 72 MMHG | HEART RATE: 70 BPM | RESPIRATION RATE: 16 BRPM | SYSTOLIC BLOOD PRESSURE: 116 MMHG

## 2020-09-09 DIAGNOSIS — I10 ESSENTIAL HYPERTENSION: ICD-10-CM

## 2020-09-09 DIAGNOSIS — E11.29 TYPE 2 DIABETES MELLITUS WITH MICROALBUMINURIA, WITHOUT LONG-TERM CURRENT USE OF INSULIN (HCC): Primary | ICD-10-CM

## 2020-09-09 DIAGNOSIS — M46.1 SACROILIITIS (HCC): ICD-10-CM

## 2020-09-09 DIAGNOSIS — Z23 ENCOUNTER FOR IMMUNIZATION: ICD-10-CM

## 2020-09-09 DIAGNOSIS — I50.9 CHRONIC CONGESTIVE HEART FAILURE, UNSPECIFIED HEART FAILURE TYPE (HCC): ICD-10-CM

## 2020-09-09 DIAGNOSIS — R80.9 TYPE 2 DIABETES MELLITUS WITH MICROALBUMINURIA, WITHOUT LONG-TERM CURRENT USE OF INSULIN (HCC): Primary | ICD-10-CM

## 2020-09-09 DIAGNOSIS — R80.9 MICROALBUMINURIA: ICD-10-CM

## 2020-09-09 DIAGNOSIS — E78.2 MIXED HYPERLIPIDEMIA: ICD-10-CM

## 2020-09-09 DIAGNOSIS — I48.91 ATRIAL FIBRILLATION, UNSPECIFIED TYPE (HCC): ICD-10-CM

## 2020-09-09 PROCEDURE — 3725F SCREEN DEPRESSION PERFORMED: CPT | Performed by: PHYSICIAN ASSISTANT

## 2020-09-09 PROCEDURE — 99214 OFFICE O/P EST MOD 30 MIN: CPT | Performed by: PHYSICIAN ASSISTANT

## 2020-09-09 PROCEDURE — 90662 IIV NO PRSV INCREASED AG IM: CPT | Performed by: PHYSICIAN ASSISTANT

## 2020-09-09 PROCEDURE — G0008 ADMIN INFLUENZA VIRUS VAC: HCPCS | Performed by: PHYSICIAN ASSISTANT

## 2020-09-09 NOTE — PROGRESS NOTES
Assessment and Plan:  Patient Instructions   Assessment/plan:  1  Congestive heart failure-stable with angiotensin receptor blocker  2  Atrial fibrillation-presently rate controlled with atenolol and anticoagulated on Coumadin  3  Type 2 diabetes with micro albuminuria stable on metformin  4  Micro albuminuria- stable with Arb  5  Hypertension-stable with Hyzaar and atenolol  6  Hyperlipidemia-stable on pravastatin 40 mg daily  7  Sacroiliitis-stable, continues follow-up with pain management as necessary  8  Healthcare maintenance-flu vaccine given today          Problem List Items Addressed This Visit        Endocrine    Type 2 diabetes mellitus ([facility] [address] ) - Primary    Relevant Orders    CBC and differential    Comprehensive metabolic panel    Hemoglobin A1C    Lipid Panel with Direct LDL reflex    TSH, 3rd generation    Microalbumin / creatinine urine ratio       Cardiovascular and Mediastinum    Atrial fibrillation (HCC)    Relevant Orders    CBC and differential    Comprehensive metabolic panel    Hemoglobin A1C    Lipid Panel with Direct LDL reflex    TSH, 3rd generation    Microalbumin / creatinine urine ratio    Chronic congestive heart failure (HCC)    Relevant Orders    CBC and differential    Comprehensive metabolic panel    Hemoglobin A1C    Lipid Panel with Direct LDL reflex    TSH, 3rd generation    Microalbumin / creatinine urine ratio    Hypertension    Relevant Orders    CBC and differential    Comprehensive metabolic panel    Hemoglobin A1C    Lipid Panel with Direct LDL reflex    TSH, 3rd generation    Microalbumin / creatinine urine ratio       Musculoskeletal and Integument    Sacroiliitis (HCC)    Relevant Orders    CBC and differential    Comprehensive metabolic panel    Hemoglobin A1C    Lipid Panel with Direct LDL reflex    TSH, 3rd generation    Microalbumin / creatinine urine ratio       Other    Hyperlipidemia    Relevant Orders    CBC and differential    Comprehensive metabolic panel Hemoglobin A1C    Lipid Panel with Direct LDL reflex    TSH, 3rd generation    Microalbumin / creatinine urine ratio    Microalbuminuria    Relevant Orders    CBC and differential    Comprehensive metabolic panel    Hemoglobin A1C    Lipid Panel with Direct LDL reflex    TSH, 3rd generation    Microalbumin / creatinine urine ratio      Other Visit Diagnoses     Encounter for immunization        Relevant Orders    influenza vaccine, high-dose, PF 0 7 mL (FLUZONE HIGH-DOSE) (Completed)                 Diagnoses and all orders for this visit:    Type 2 diabetes mellitus with microalbuminuria, without long-term current use of insulin (HCC)  -     CBC and differential; Future  -     Comprehensive metabolic panel; Future  -     Hemoglobin A1C; Future  -     Lipid Panel with Direct LDL reflex; Future  -     TSH, 3rd generation; Future  -     Microalbumin / creatinine urine ratio; Future    Chronic congestive heart failure, unspecified heart failure type (HCC)  -     CBC and differential; Future  -     Comprehensive metabolic panel; Future  -     Hemoglobin A1C; Future  -     Lipid Panel with Direct LDL reflex; Future  -     TSH, 3rd generation; Future  -     Microalbumin / creatinine urine ratio; Future    Atrial fibrillation, unspecified type (HCC)  -     CBC and differential; Future  -     Comprehensive metabolic panel; Future  -     Hemoglobin A1C; Future  -     Lipid Panel with Direct LDL reflex; Future  -     TSH, 3rd generation; Future  -     Microalbumin / creatinine urine ratio; Future    Essential hypertension  -     CBC and differential; Future  -     Comprehensive metabolic panel; Future  -     Hemoglobin A1C; Future  -     Lipid Panel with Direct LDL reflex; Future  -     TSH, 3rd generation; Future  -     Microalbumin / creatinine urine ratio; Future    Microalbuminuria  -     CBC and differential; Future  -     Comprehensive metabolic panel;  Future  -     Hemoglobin A1C; Future  -     Lipid Panel with Direct LDL reflex; Future  -     TSH, 3rd generation; Future  -     Microalbumin / creatinine urine ratio; Future    Mixed hyperlipidemia  -     CBC and differential; Future  -     Comprehensive metabolic panel; Future  -     Hemoglobin A1C; Future  -     Lipid Panel with Direct LDL reflex; Future  -     TSH, 3rd generation; Future  -     Microalbumin / creatinine urine ratio; Future    Sacroiliitis (HCC)  -     CBC and differential; Future  -     Comprehensive metabolic panel; Future  -     Hemoglobin A1C; Future  -     Lipid Panel with Direct LDL reflex; Future  -     TSH, 3rd generation; Future  -     Microalbumin / creatinine urine ratio; Future    Encounter for immunization  -     influenza vaccine, high-dose, PF 0 7 mL (FLUZONE HIGH-DOSE)              Subjective:      Patient ID: Konrad Scott is a 80 y o  female  CC:    Chief Complaint   Patient presents with    Follow-up     6 month       HPI:    HPI:  This is an 60-year-old female who presents to the office for follow-up chronic health conditions and blood work about a month and half ago  She has a history congestive heart failure, atrial fibrillation, and type 2 diabetes with micro albuminuria  She is feeling well without any acute complaints today  Her diabetes has been well controlled with metformin  She does have a history of congestive heart failure and atrial fibrillation and continues to follow-up with Cardiology, Dr Nelson Paez  She also has a history of sacroiliitis and has been following with Dr Erika Gamboa regularly  She is currently maintained on gabapentin and stable  She is interested in getting flu vaccine today  The following portions of the patient's history were reviewed and updated as appropriate: allergies, current medications, past family history, past medical history, past social history, past surgical history and problem list       Review of Systems   Constitutional: Negative for chills, fatigue and fever     HENT: Negative for congestion, ear pain and sinus pressure  Eyes: Negative for visual disturbance  Respiratory: Negative for cough, chest tightness and shortness of breath  Cardiovascular: Negative for chest pain and palpitations  Gastrointestinal: Negative for diarrhea, nausea and vomiting  Endocrine: Negative for polyuria  Genitourinary: Negative for dysuria and frequency  Musculoskeletal: Negative for arthralgias and myalgias  Skin: Negative for pallor and rash  Neurological: Negative for dizziness, weakness, light-headedness, numbness and headaches  Psychiatric/Behavioral: Negative for agitation, behavioral problems and sleep disturbance  All other systems reviewed and are negative  Data to review:       Objective:    Vitals:    09/09/20 1257   BP: 116/72   BP Location: Left arm   Patient Position: Sitting   Cuff Size: Adult   Pulse: 70   Resp: 16   Temp: 98 7 °F (37 1 °C)   TempSrc: Tympanic   Weight: 68 9 kg (151 lb 12 8 oz)   Height: 5' 4" (1 626 m)        Physical Exam  Constitutional:       General: She is not in acute distress  Appearance: Normal appearance  HENT:      Head: Normocephalic and atraumatic  Right Ear: Tympanic membrane normal       Left Ear: Tympanic membrane normal       Nose: No congestion or rhinorrhea  Eyes:      Conjunctiva/sclera: Conjunctivae normal       Pupils: Pupils are equal, round, and reactive to light  Neck:      Musculoskeletal: Normal range of motion and neck supple  No muscular tenderness  Vascular: No carotid bruit  Cardiovascular:      Rate and Rhythm: Normal rate and regular rhythm  Pulses:           Dorsalis pedis pulses are 2+ on the right side and 2+ on the left side  Posterior tibial pulses are 2+ on the right side and 2+ on the left side  Heart sounds: No murmur  Pulmonary:      Effort: Pulmonary effort is normal  No respiratory distress  Breath sounds: Normal breath sounds     Abdominal:      Palpations: Abdomen is soft  Musculoskeletal: Normal range of motion  Feet:      Right foot:      Skin integrity: No ulcer, skin breakdown, erythema, warmth, callus or dry skin  Left foot:      Skin integrity: No ulcer, skin breakdown, erythema, warmth, callus or dry skin  Lymphadenopathy:      Cervical: No cervical adenopathy  Skin:     General: Skin is warm  Capillary Refill: Capillary refill takes less than 2 seconds  Neurological:      General: No focal deficit present  Mental Status: She is alert and oriented to person, place, and time  Psychiatric:         Mood and Affect: Mood normal              Falls Plan of Care: balance, strength, and gait training instructions were provided  Diabetic Foot Exam    Patient's shoes and socks removed  Right Foot/Ankle   Right Foot Inspection  Skin Exam: skin normal and skin intact no dry skin, no warmth, no callus, no erythema, no maceration, no abnormal color, no pre-ulcer, no ulcer and no callus                          Toe Exam: ROM and strength within normal limits  Sensory   Vibration: intact  Proprioception: intact   Monofilament testing: intact  Vascular  Capillary refills: < 3 seconds  The right DP pulse is 2+  The right PT pulse is 2+  Right Toe  - Comprehensive Exam  Ecchymosis: none  Arch: normal  Hammertoes: absent  Claw Toes: absent  Swelling: none   Tenderness: none         Left Foot/Ankle  Left Foot Inspection  Skin Exam: skin normal and skin intactno dry skin, no warmth, no erythema, no maceration, normal color, no pre-ulcer, no ulcer and no callus                         Toe Exam: ROM and strength within normal limits                   Sensory   Vibration: intact  Proprioception: intact  Monofilament: intact  Vascular  Capillary refills: < 3 seconds  The left DP pulse is 2+  The left PT pulse is 2+     Left Toe  - Comprehensive Exam  Ecchymosis: none  Arch: normal  Hammertoes: absent  Claw toes: absent  Swelling: none   Tenderness: none Assign Risk Category:  No deformity present;  No loss of protective sensation;        Risk: 0

## 2020-09-09 NOTE — PATIENT INSTRUCTIONS
Assessment/plan:  1  Congestive heart failure-stable with angiotensin receptor blocker  2  Atrial fibrillation-presently rate controlled with atenolol and anticoagulated on Coumadin  3  Type 2 diabetes with micro albuminuria stable on metformin  4  Micro albuminuria- stable with Arb  5  Hypertension-stable with Hyzaar and atenolol  6  Hyperlipidemia-stable on pravastatin 40 mg daily  7  Sacroiliitis-stable, continues follow-up with pain management as necessary  8  Healthcare maintenance-flu vaccine given today

## 2020-09-11 ENCOUNTER — TELEPHONE (OUTPATIENT)
Dept: FAMILY MEDICINE CLINIC | Facility: CLINIC | Age: 85
End: 2020-09-11

## 2020-09-11 DIAGNOSIS — I73.9 PERIPHERAL ARTERIAL DISEASE (HCC): Primary | ICD-10-CM

## 2020-09-11 NOTE — TELEPHONE ENCOUNTER
DURING HOME VISIT, [employer] NURSE REPORTS POSITIVE SCREEN FOR PAD FOR PATIENT  FOUND L LEG HAD MILD AND R LEG WAS MODERATE

## 2020-09-16 ENCOUNTER — OFFICE VISIT (OUTPATIENT)
Dept: CARDIOLOGY CLINIC | Facility: CLINIC | Age: 85
End: 2020-09-16
Payer: COMMERCIAL

## 2020-09-16 VITALS
SYSTOLIC BLOOD PRESSURE: 122 MMHG | HEIGHT: 64 IN | WEIGHT: 150.6 LBS | DIASTOLIC BLOOD PRESSURE: 62 MMHG | TEMPERATURE: 97.3 F | HEART RATE: 66 BPM | BODY MASS INDEX: 25.71 KG/M2

## 2020-09-16 DIAGNOSIS — K62.5 RECTAL BLEEDING: ICD-10-CM

## 2020-09-16 DIAGNOSIS — I48.11 LONGSTANDING PERSISTENT ATRIAL FIBRILLATION (HCC): Primary | ICD-10-CM

## 2020-09-16 DIAGNOSIS — E78.5 DYSLIPIDEMIA: ICD-10-CM

## 2020-09-16 DIAGNOSIS — I10 ESSENTIAL HYPERTENSION: ICD-10-CM

## 2020-09-16 PROCEDURE — 99214 OFFICE O/P EST MOD 30 MIN: CPT | Performed by: INTERNAL MEDICINE

## 2020-09-17 ENCOUNTER — ANTICOAG VISIT (OUTPATIENT)
Dept: CARDIOLOGY CLINIC | Facility: CLINIC | Age: 85
End: 2020-09-17

## 2020-09-17 ENCOUNTER — APPOINTMENT (OUTPATIENT)
Dept: LAB | Facility: CLINIC | Age: 85
End: 2020-09-17
Payer: COMMERCIAL

## 2020-09-17 DIAGNOSIS — Z95.2 HISTORY OF MITRAL VALVE REPLACEMENT: ICD-10-CM

## 2020-09-17 DIAGNOSIS — Z79.891 LONG-TERM CURRENT USE OF OPIATE ANALGESIC: ICD-10-CM

## 2020-09-17 DIAGNOSIS — I48.11 LONGSTANDING PERSISTENT ATRIAL FIBRILLATION (HCC): ICD-10-CM

## 2020-09-17 NOTE — PROGRESS NOTES
Tc to pt, will continue current dose, inr due 3 weeks  Pt reports she has ov with GI for hemorrhoidal bleeding

## 2020-09-22 ENCOUNTER — OFFICE VISIT (OUTPATIENT)
Dept: GASTROENTEROLOGY | Facility: MEDICAL CENTER | Age: 85
End: 2020-09-22
Payer: COMMERCIAL

## 2020-09-22 VITALS
TEMPERATURE: 97.7 F | BODY MASS INDEX: 24.75 KG/M2 | WEIGHT: 145 LBS | HEIGHT: 64 IN | SYSTOLIC BLOOD PRESSURE: 134 MMHG | DIASTOLIC BLOOD PRESSURE: 75 MMHG | HEART RATE: 56 BPM

## 2020-09-22 DIAGNOSIS — K62.5 RECTAL BLEEDING: ICD-10-CM

## 2020-09-22 PROCEDURE — 3078F DIAST BP <80 MM HG: CPT | Performed by: PHYSICIAN ASSISTANT

## 2020-09-22 PROCEDURE — 99204 OFFICE O/P NEW MOD 45 MIN: CPT | Performed by: PHYSICIAN ASSISTANT

## 2020-09-22 PROCEDURE — 3075F SYST BP GE 130 - 139MM HG: CPT | Performed by: PHYSICIAN ASSISTANT

## 2020-09-22 NOTE — PROGRESS NOTES
[facility] - Outpatient Follow-up Note  Radha Pedraza 80 y o  female MRN: [id number]  Encounter: [id number]          ASSESSMENT AND PLAN:      1  Rectal bleeding  2  Constipation: she admits to longstanding hx of constipation and rectal bleeding when she strains  She is using miralax 17g daily but does not feel a benefit  She is willing to try a prescription medication  Her last colonoscopy was about 2 years ago with Dr Mala Santos which was normal and she was told she didn't have to have another due to advanced age  We will given samples of amitiza 8 and 24mcg and she will let us know if this works for her so we can send a prescription to her pharmacy  If no improvement, recommend colonoscopy for further evaluation   - amitiza samples 8 and 24 mcg  -if no relief, plan for colonscopy  -f/u in 1 month    3  Dark stools: she admits to dark stools a few weeks ago that has now resolved  She does take coumadin for mechanical mitral valve  If this happens again, would recommend egd for further evaluation to rule out any source of upper gi bleeding  Fortunately her most recent cbc was wnl  -monitor stool  -if reoccurs, recommend EGD for further evaluation     ______________________________________________________________________    SUBJECTIVE:  Neel Choi is a pleasant 81 yo female with pmh arthritis, chronic back pain, DM, HTN, mechanical mitral valve on coumadin who is here for constipation  She admits to longstanding hx of constipation that originally started when she was started on narcotic pain medication for back pain  She was on tramadol and d/c this but her constipation continued  She has tried miralax without relief  She also admits to hematochezia when she strains to have a hard stool  She does report some dark stools a few weeks ago that has now resolved  She denies any nausea, vomiting, epigastric abdominal pain  She does have some lower abdominal cramping improved after a bm     She states her last colonoscopy was 2 years ago with Dr Rock Acosta that was normal and she was told she did not need to repeat due to her age  REVIEW OF SYSTEMS IS OTHERWISE NEGATIVE  Historical Information   Past Medical History:   Diagnosis Date    Arthritis     Benign polyp of large intestine     Chronic pain disorder     bilateral back - last assessed 12/27/17    Diabetes mellitus ([address] )     Endocarditis     Hx of skin cancer, basal cell     Hypertension     Hypertension     Joint pain     Low back pain     Spinal stenosis     Stroke syndrome 1985     Past Surgical History:   Procedure Laterality Date    BACK SURGERY      CARPAL TUNNEL RELEASE Left     CATARACT EXTRACTION, BILATERAL  2011    CHOLECYSTECTOMY      FOOT SURGERY Right     GALLBLADDER SURGERY      LAMINECTOMY      LUMBAR LAMINECTOMY Right 11/08/2012    L3-4 min   invasive far lateral microdiscectomy for decompression of nerve root, transforaminal epidural steroid injection; operating room microscope for microdissection    MITRAL VALVE REPLACEMENT      biolical valve failed [address]      UMBILICAL HERNIA REPAIR N/A 3/17/2017    Procedure: REPAIR HERNIA UMBILICAL;  Surgeon: Dionna Perez DO;  [facility];  Service:     VALVE REPLACEMENT       Social History   Social History     Substance and Sexual Activity   Alcohol Use No     Social History     Substance and Sexual Activity   Drug Use No     Social History     Tobacco Use   Smoking Status Never Smoker   Smokeless Tobacco Never Used     Family History   Problem Relation Age of Onset    Clotting disorder Mother         disorder of blood and blood forming organs    Heart attack Father     Cancer Maternal Grandfather         doesnt know age   [facility] Heart disease Paternal Grandfather     Diabetes Family        Meds/Allergies       Current Outpatient Medications:     atenolol (TENORMIN) 50 mg tablet   gabapentin (NEURONTIN) 300 mg capsule    losartan-hydrochlorothiazide (HYZAAR) 100-12 5 MG per tablet    metFORMIN (GLUCOPHAGE-XR) 500 mg 24 hr tablet    pravastatin (PRAVACHOL) 40 mg tablet    warfarin (COUMADIN) 5 mg tablet    Allergies   Allergen Reactions    Lisinopril      cough           Objective     Blood pressure 134/75, pulse 56, temperature 97 7 °F (36 5 °C), temperature source Tympanic, height 5' 4" (1 626 m), weight 65 8 kg (145 lb)  Body mass index is 24 89 kg/m²  PHYSICAL EXAM:      General Appearance:   Alert, cooperative, no distress   HEENT:   Normocephalic, atraumatic, anicteric      Neck:  Supple, symmetrical, trachea midline   Lungs:   Clear to auscultation bilaterally; no rales, rhonchi or wheezing; respirations unlabored    Heart[de-identified]   Regular rate and rhythm; no murmur, rub, or gallop  Abdomen:   Soft, non-tender, non-distended; normal bowel sounds; no masses, no organomegaly    Genitalia:   Deferred    Rectal:   Deferred    Extremities:  No cyanosis, clubbing or edema    Pulses:  2+ and symmetric    Skin:  No jaundice, rashes, or lesions    Lymph nodes:  No palpable cervical lymphadenopathy        Lab Results:   No visits with results within 1 Day(s) from this visit  Latest known visit with results is: Ancillary Orders on 09/04/2020   Component Date Value    Protime 09/17/2020 33 4*    INR 09/17/2020 3 32*         Radiology Results:   No results found

## 2020-09-28 DIAGNOSIS — I10 ESSENTIAL HYPERTENSION: ICD-10-CM

## 2020-09-28 RX ORDER — LOSARTAN POTASSIUM AND HYDROCHLOROTHIAZIDE 12.5; 1 MG/1; MG/1
TABLET ORAL
Qty: 90 TABLET | Refills: 0 | Status: SHIPPED | OUTPATIENT
Start: 2020-09-28 | End: 2020-12-07

## 2020-09-30 DIAGNOSIS — I10 HYPERTENSION, UNSPECIFIED TYPE: ICD-10-CM

## 2020-10-01 RX ORDER — ATENOLOL 50 MG/1
TABLET ORAL
Qty: 90 TABLET | Refills: 0 | Status: SHIPPED | OUTPATIENT
Start: 2020-10-01 | End: 2020-12-07

## 2020-10-02 PROBLEM — I73.9 PAD (PERIPHERAL ARTERY DISEASE) (HCC): Status: ACTIVE | Noted: 2020-10-02

## 2020-10-05 ENCOUNTER — CONSULT (OUTPATIENT)
Dept: VASCULAR SURGERY | Facility: CLINIC | Age: 85
End: 2020-10-05
Payer: COMMERCIAL

## 2020-10-05 VITALS
BODY MASS INDEX: 24.75 KG/M2 | DIASTOLIC BLOOD PRESSURE: 62 MMHG | HEART RATE: 65 BPM | TEMPERATURE: 98.9 F | WEIGHT: 145 LBS | SYSTOLIC BLOOD PRESSURE: 130 MMHG | HEIGHT: 64 IN | RESPIRATION RATE: 16 BRPM

## 2020-10-05 DIAGNOSIS — R80.9 TYPE 2 DIABETES MELLITUS WITH MICROALBUMINURIA, WITHOUT LONG-TERM CURRENT USE OF INSULIN (HCC): ICD-10-CM

## 2020-10-05 DIAGNOSIS — M46.1 SACROILIITIS (HCC): ICD-10-CM

## 2020-10-05 DIAGNOSIS — I73.9 PERIPHERAL ARTERIAL DISEASE (HCC): ICD-10-CM

## 2020-10-05 DIAGNOSIS — I10 ESSENTIAL HYPERTENSION: ICD-10-CM

## 2020-10-05 DIAGNOSIS — I48.11 LONGSTANDING PERSISTENT ATRIAL FIBRILLATION (HCC): ICD-10-CM

## 2020-10-05 DIAGNOSIS — E11.29 TYPE 2 DIABETES MELLITUS WITH MICROALBUMINURIA, WITHOUT LONG-TERM CURRENT USE OF INSULIN (HCC): ICD-10-CM

## 2020-10-05 DIAGNOSIS — E78.2 MIXED HYPERLIPIDEMIA: ICD-10-CM

## 2020-10-05 DIAGNOSIS — I73.9 PAD (PERIPHERAL ARTERY DISEASE) (HCC): Primary | ICD-10-CM

## 2020-10-05 PROCEDURE — 1160F RVW MEDS BY RX/DR IN RCRD: CPT | Performed by: PHYSICIAN ASSISTANT

## 2020-10-05 PROCEDURE — 99204 OFFICE O/P NEW MOD 45 MIN: CPT | Performed by: PHYSICIAN ASSISTANT

## 2020-10-05 PROCEDURE — 1036F TOBACCO NON-USER: CPT | Performed by: PHYSICIAN ASSISTANT

## 2020-10-08 ENCOUNTER — LAB (OUTPATIENT)
Dept: LAB | Facility: CLINIC | Age: 85
End: 2020-10-08
Payer: COMMERCIAL

## 2020-10-08 ENCOUNTER — ANTICOAG VISIT (OUTPATIENT)
Dept: CARDIOLOGY CLINIC | Facility: CLINIC | Age: 85
End: 2020-10-08

## 2020-10-08 ENCOUNTER — TRANSCRIBE ORDERS (OUTPATIENT)
Dept: LAB | Facility: CLINIC | Age: 85
End: 2020-10-08

## 2020-10-08 DIAGNOSIS — Z79.891 LONG-TERM CURRENT USE OF OPIATE ANALGESIC: ICD-10-CM

## 2020-10-08 DIAGNOSIS — Z95.2 HISTORY OF MITRAL VALVE REPLACEMENT: ICD-10-CM

## 2020-10-08 DIAGNOSIS — I48.11 LONGSTANDING PERSISTENT ATRIAL FIBRILLATION (HCC): ICD-10-CM

## 2020-10-29 ENCOUNTER — OFFICE VISIT (OUTPATIENT)
Dept: GASTROENTEROLOGY | Facility: MEDICAL CENTER | Age: 85
End: 2020-10-29
Payer: COMMERCIAL

## 2020-10-29 ENCOUNTER — TELEPHONE (OUTPATIENT)
Dept: GASTROENTEROLOGY | Facility: MEDICAL CENTER | Age: 85
End: 2020-10-29

## 2020-10-29 VITALS
HEART RATE: 60 BPM | HEIGHT: 64 IN | SYSTOLIC BLOOD PRESSURE: 128 MMHG | TEMPERATURE: 95.4 F | DIASTOLIC BLOOD PRESSURE: 84 MMHG | BODY MASS INDEX: 24.07 KG/M2 | WEIGHT: 141 LBS

## 2020-10-29 DIAGNOSIS — K92.1 MELENA: Primary | ICD-10-CM

## 2020-10-29 DIAGNOSIS — K92.1 HEMATOCHEZIA: ICD-10-CM

## 2020-10-29 PROCEDURE — 3079F DIAST BP 80-89 MM HG: CPT | Performed by: PHYSICIAN ASSISTANT

## 2020-10-29 PROCEDURE — 1160F RVW MEDS BY RX/DR IN RCRD: CPT | Performed by: PHYSICIAN ASSISTANT

## 2020-10-29 PROCEDURE — 3074F SYST BP LT 130 MM HG: CPT | Performed by: PHYSICIAN ASSISTANT

## 2020-10-29 PROCEDURE — 99214 OFFICE O/P EST MOD 30 MIN: CPT | Performed by: PHYSICIAN ASSISTANT

## 2020-10-29 RX ORDER — PANTOPRAZOLE SODIUM 20 MG/1
20 TABLET, DELAYED RELEASE ORAL DAILY
Qty: 30 TABLET | Refills: 2 | Status: SHIPPED | OUTPATIENT
Start: 2020-10-29

## 2020-10-30 ENCOUNTER — TELEPHONE (OUTPATIENT)
Dept: CARDIOLOGY CLINIC | Facility: CLINIC | Age: 85
End: 2020-10-30

## 2020-11-02 ENCOUNTER — TRANSCRIBE ORDERS (OUTPATIENT)
Dept: GASTROENTEROLOGY | Facility: CLINIC | Age: 85
End: 2020-11-02

## 2020-11-03 DIAGNOSIS — Z95.2 HISTORY OF MITRAL VALVE REPLACEMENT: Primary | ICD-10-CM

## 2020-11-04 ENCOUNTER — TELEPHONE (OUTPATIENT)
Dept: CARDIOLOGY CLINIC | Facility: CLINIC | Age: 85
End: 2020-11-04

## 2020-11-05 ENCOUNTER — TELEPHONE (OUTPATIENT)
Dept: CARDIOLOGY CLINIC | Facility: CLINIC | Age: 85
End: 2020-11-05

## 2020-11-05 ENCOUNTER — LAB (OUTPATIENT)
Dept: LAB | Facility: CLINIC | Age: 85
End: 2020-11-05
Payer: COMMERCIAL

## 2020-11-05 ENCOUNTER — ANTICOAG VISIT (OUTPATIENT)
Dept: CARDIOLOGY CLINIC | Facility: CLINIC | Age: 85
End: 2020-11-05

## 2020-11-05 DIAGNOSIS — Z79.891 LONG-TERM CURRENT USE OF OPIATE ANALGESIC: ICD-10-CM

## 2020-11-05 DIAGNOSIS — Z95.2 HISTORY OF MITRAL VALVE REPLACEMENT: ICD-10-CM

## 2020-11-05 DIAGNOSIS — I48.11 LONGSTANDING PERSISTENT ATRIAL FIBRILLATION (HCC): ICD-10-CM

## 2020-11-05 LAB
BASOPHILS # BLD AUTO: 0.05 THOUSANDS/ΜL (ref 0–0.1)
BASOPHILS NFR BLD AUTO: 1 % (ref 0–1)
EOSINOPHIL # BLD AUTO: 0.09 THOUSAND/ΜL (ref 0–0.61)
EOSINOPHIL NFR BLD AUTO: 1 % (ref 0–6)
ERYTHROCYTE [DISTWIDTH] IN BLOOD BY AUTOMATED COUNT: 13.3 % (ref 11.6–15.1)
HCT VFR BLD AUTO: 40.9 % (ref 34.8–46.1)
HGB BLD-MCNC: 12.9 G/DL (ref 11.5–15.4)
IMM GRANULOCYTES # BLD AUTO: 0.02 THOUSAND/UL (ref 0–0.2)
IMM GRANULOCYTES NFR BLD AUTO: 0 % (ref 0–2)
LYMPHOCYTES # BLD AUTO: 1.59 THOUSANDS/ΜL (ref 0.6–4.47)
LYMPHOCYTES NFR BLD AUTO: 21 % (ref 14–44)
MCH RBC QN AUTO: 29.9 PG (ref 26.8–34.3)
MCHC RBC AUTO-ENTMCNC: 31.5 G/DL (ref 31.4–37.4)
MCV RBC AUTO: 95 FL (ref 82–98)
MONOCYTES # BLD AUTO: 0.57 THOUSAND/ΜL (ref 0.17–1.22)
MONOCYTES NFR BLD AUTO: 7 % (ref 4–12)
NEUTROPHILS # BLD AUTO: 5.4 THOUSANDS/ΜL (ref 1.85–7.62)
NEUTS SEG NFR BLD AUTO: 70 % (ref 43–75)
NRBC BLD AUTO-RTO: 0 /100 WBCS
PLATELET # BLD AUTO: 282 THOUSANDS/UL (ref 149–390)
PMV BLD AUTO: 12 FL (ref 8.9–12.7)
RBC # BLD AUTO: 4.32 MILLION/UL (ref 3.81–5.12)
WBC # BLD AUTO: 7.72 THOUSAND/UL (ref 4.31–10.16)

## 2020-11-05 PROCEDURE — 85025 COMPLETE CBC W/AUTO DIFF WBC: CPT | Performed by: PHYSICIAN ASSISTANT

## 2020-11-09 ENCOUNTER — TRANSCRIBE ORDERS (OUTPATIENT)
Dept: LAB | Facility: CLINIC | Age: 85
End: 2020-11-09

## 2020-11-09 ENCOUNTER — LAB (OUTPATIENT)
Dept: LAB | Facility: CLINIC | Age: 85
End: 2020-11-09
Payer: COMMERCIAL

## 2020-11-09 ENCOUNTER — TELEPHONE (OUTPATIENT)
Dept: GASTROENTEROLOGY | Facility: MEDICAL CENTER | Age: 85
End: 2020-11-09

## 2020-11-09 ENCOUNTER — ANTICOAG VISIT (OUTPATIENT)
Dept: CARDIOLOGY CLINIC | Facility: CLINIC | Age: 85
End: 2020-11-09

## 2020-11-09 DIAGNOSIS — Z79.891 LONG-TERM CURRENT USE OF OPIATE ANALGESIC: ICD-10-CM

## 2020-11-09 DIAGNOSIS — I48.91 ATRIAL FIBRILLATION, UNSPECIFIED TYPE (HCC): Primary | ICD-10-CM

## 2020-11-09 DIAGNOSIS — I48.11 LONGSTANDING PERSISTENT ATRIAL FIBRILLATION (HCC): ICD-10-CM

## 2020-11-09 DIAGNOSIS — Z95.2 HISTORY OF MITRAL VALVE REPLACEMENT: ICD-10-CM

## 2020-11-10 DIAGNOSIS — E11.9 TYPE 2 DIABETES MELLITUS WITHOUT COMPLICATION, WITHOUT LONG-TERM CURRENT USE OF INSULIN (HCC): ICD-10-CM

## 2020-11-11 RX ORDER — METFORMIN HYDROCHLORIDE 500 MG/1
TABLET, EXTENDED RELEASE ORAL
Qty: 90 TABLET | Refills: 0 | Status: SHIPPED | OUTPATIENT
Start: 2020-11-11

## 2020-11-12 ENCOUNTER — ANESTHESIA EVENT (OUTPATIENT)
Dept: GASTROENTEROLOGY | Facility: HOSPITAL | Age: 85
End: 2020-11-12

## 2020-11-12 ENCOUNTER — ANESTHESIA (OUTPATIENT)
Dept: GASTROENTEROLOGY | Facility: HOSPITAL | Age: 85
End: 2020-11-12

## 2020-11-12 ENCOUNTER — HOSPITAL ENCOUNTER (OUTPATIENT)
Dept: GASTROENTEROLOGY | Facility: HOSPITAL | Age: 85
Setting detail: OUTPATIENT SURGERY
Discharge: HOME | End: 2020-11-12
Payer: COMMERCIAL

## 2020-11-12 VITALS
DIASTOLIC BLOOD PRESSURE: 55 MMHG | OXYGEN SATURATION: 97 % | SYSTOLIC BLOOD PRESSURE: 93 MMHG | RESPIRATION RATE: 22 BRPM | WEIGHT: 141 LBS | HEART RATE: 59 BPM | BODY MASS INDEX: 24.07 KG/M2 | HEIGHT: 64 IN | TEMPERATURE: 98.5 F

## 2020-11-12 VITALS — HEART RATE: 57 BPM

## 2020-11-12 DIAGNOSIS — K92.1 HEMATOCHEZIA: ICD-10-CM

## 2020-11-12 DIAGNOSIS — K92.1 MELENA: ICD-10-CM

## 2020-11-12 DIAGNOSIS — K63.9 COLONIC THICKENING: Primary | ICD-10-CM

## 2020-11-12 LAB — GLUCOSE SERPL-MCNC: 113 MG/DL (ref 65–140)

## 2020-11-12 PROCEDURE — 82948 REAGENT STRIP/BLOOD GLUCOSE: CPT

## 2020-11-12 PROCEDURE — 88305 TISSUE EXAM BY PATHOLOGIST: CPT | Performed by: PATHOLOGY

## 2020-11-12 PROCEDURE — 43239 EGD BIOPSY SINGLE/MULTIPLE: CPT | Performed by: INTERNAL MEDICINE

## 2020-11-12 PROCEDURE — 45380 COLONOSCOPY AND BIOPSY: CPT | Performed by: INTERNAL MEDICINE

## 2020-11-12 PROCEDURE — 45385 COLONOSCOPY W/LESION REMOVAL: CPT | Performed by: INTERNAL MEDICINE

## 2020-11-12 RX ORDER — PROPOFOL 10 MG/ML
INJECTION, EMULSION INTRAVENOUS AS NEEDED
Status: DISCONTINUED | OUTPATIENT
Start: 2020-11-12 | End: 2020-11-12

## 2020-11-12 RX ORDER — SODIUM CHLORIDE 9 MG/ML
125 INJECTION, SOLUTION INTRAVENOUS CONTINUOUS
Status: DISCONTINUED | OUTPATIENT
Start: 2020-11-12 | End: 2020-11-16 | Stop reason: HOSPADM

## 2020-11-12 RX ORDER — SODIUM CHLORIDE 9 MG/ML
125 INJECTION, SOLUTION INTRAVENOUS CONTINUOUS
Status: CANCELLED | OUTPATIENT
Start: 2020-11-12

## 2020-11-12 RX ORDER — EPHEDRINE SULFATE 50 MG/ML
INJECTION INTRAVENOUS AS NEEDED
Status: DISCONTINUED | OUTPATIENT
Start: 2020-11-12 | End: 2020-11-12

## 2020-11-12 RX ADMIN — PROPOFOL 20 MG: 10 INJECTION, EMULSION INTRAVENOUS at 08:37

## 2020-11-12 RX ADMIN — SODIUM CHLORIDE: 0.9 INJECTION, SOLUTION INTRAVENOUS at 09:04

## 2020-11-12 RX ADMIN — PROPOFOL 20 MG: 10 INJECTION, EMULSION INTRAVENOUS at 08:34

## 2020-11-12 RX ADMIN — PROPOFOL 20 MG: 10 INJECTION, EMULSION INTRAVENOUS at 08:32

## 2020-11-12 RX ADMIN — PROPOFOL 20 MG: 10 INJECTION, EMULSION INTRAVENOUS at 08:25

## 2020-11-12 RX ADMIN — SODIUM CHLORIDE 125 ML/HR: 0.9 INJECTION, SOLUTION INTRAVENOUS at 08:06

## 2020-11-12 RX ADMIN — EPHEDRINE SULFATE 10 MG: 50 INJECTION, SOLUTION INTRAVENOUS at 08:20

## 2020-11-12 RX ADMIN — PROPOFOL 120 MG: 10 INJECTION, EMULSION INTRAVENOUS at 08:14

## 2020-11-12 RX ADMIN — PROPOFOL 20 MG: 10 INJECTION, EMULSION INTRAVENOUS at 08:44

## 2020-11-12 RX ADMIN — PROPOFOL 20 MG: 10 INJECTION, EMULSION INTRAVENOUS at 08:52

## 2020-11-16 ENCOUNTER — ANTICOAG VISIT (OUTPATIENT)
Dept: CARDIOLOGY CLINIC | Facility: CLINIC | Age: 85
End: 2020-11-16

## 2020-11-16 ENCOUNTER — LAB (OUTPATIENT)
Dept: LAB | Facility: CLINIC | Age: 85
End: 2020-11-16
Payer: COMMERCIAL

## 2020-11-16 DIAGNOSIS — Z95.2 HISTORY OF MITRAL VALVE REPLACEMENT: ICD-10-CM

## 2020-11-16 DIAGNOSIS — Z79.891 LONG-TERM CURRENT USE OF OPIATE ANALGESIC: ICD-10-CM

## 2020-11-16 DIAGNOSIS — I48.11 LONGSTANDING PERSISTENT ATRIAL FIBRILLATION (HCC): ICD-10-CM

## 2020-11-18 ENCOUNTER — LAB (OUTPATIENT)
Dept: LAB | Facility: CLINIC | Age: 85
End: 2020-11-18
Payer: COMMERCIAL

## 2020-11-18 ENCOUNTER — ANTICOAG VISIT (OUTPATIENT)
Dept: CARDIOLOGY CLINIC | Facility: CLINIC | Age: 85
End: 2020-11-18

## 2020-11-18 DIAGNOSIS — I48.11 LONGSTANDING PERSISTENT ATRIAL FIBRILLATION (HCC): ICD-10-CM

## 2020-11-18 DIAGNOSIS — I48.91 ATRIAL FIBRILLATION, UNSPECIFIED TYPE (HCC): ICD-10-CM

## 2020-11-18 DIAGNOSIS — Z95.2 HISTORY OF MITRAL VALVE REPLACEMENT: ICD-10-CM

## 2020-11-18 DIAGNOSIS — Z79.891 LONG-TERM CURRENT USE OF OPIATE ANALGESIC: ICD-10-CM

## 2020-11-18 LAB
INR PPP: 3.07 (ref 0.84–1.19)
PROTHROMBIN TIME: 31.5 SECONDS (ref 11.6–14.5)

## 2020-11-18 PROCEDURE — 85610 PROTHROMBIN TIME: CPT

## 2020-11-18 PROCEDURE — 36415 COLL VENOUS BLD VENIPUNCTURE: CPT

## 2020-11-19 ENCOUNTER — HOSPITAL ENCOUNTER (OUTPATIENT)
Dept: CT IMAGING | Facility: HOSPITAL | Age: 85
Discharge: HOME | End: 2020-11-19
Payer: COMMERCIAL

## 2020-11-19 DIAGNOSIS — K63.9 COLONIC THICKENING: ICD-10-CM

## 2020-11-19 PROCEDURE — 74177 CT ABD & PELVIS W/CONTRAST: CPT

## 2020-11-19 RX ADMIN — IOHEXOL 100 ML: 350 INJECTION, SOLUTION INTRAVENOUS at 09:26

## 2020-11-23 ENCOUNTER — PREP FOR PROCEDURE (OUTPATIENT)
Dept: GASTROENTEROLOGY | Facility: MEDICAL CENTER | Age: 85
End: 2020-11-23

## 2020-11-23 ENCOUNTER — ANTICOAG VISIT (OUTPATIENT)
Dept: CARDIOLOGY CLINIC | Facility: CLINIC | Age: 85
End: 2020-11-23

## 2020-11-23 ENCOUNTER — LAB (OUTPATIENT)
Dept: LAB | Facility: CLINIC | Age: 85
End: 2020-11-23
Payer: COMMERCIAL

## 2020-11-23 DIAGNOSIS — Z95.2 HISTORY OF MITRAL VALVE REPLACEMENT: ICD-10-CM

## 2020-11-23 DIAGNOSIS — I48.11 LONGSTANDING PERSISTENT ATRIAL FIBRILLATION (HCC): ICD-10-CM

## 2020-11-23 DIAGNOSIS — Z79.891 LONG-TERM CURRENT USE OF OPIATE ANALGESIC: ICD-10-CM

## 2020-11-23 DIAGNOSIS — R19.00 ABDOMINAL MASS, UNSPECIFIED ABDOMINAL LOCATION: Primary | ICD-10-CM

## 2020-11-23 LAB
INR PPP: 3.26 (ref 0.84–1.19)
PROTHROMBIN TIME: 33 SECONDS (ref 11.6–14.5)

## 2020-11-23 PROCEDURE — 85610 PROTHROMBIN TIME: CPT

## 2020-11-23 PROCEDURE — 36415 COLL VENOUS BLD VENIPUNCTURE: CPT

## 2020-11-24 ENCOUNTER — TELEPHONE (OUTPATIENT)
Dept: GASTROENTEROLOGY | Facility: CLINIC | Age: 85
End: 2020-11-24

## 2020-11-24 ENCOUNTER — PATIENT OUTREACH (OUTPATIENT)
Dept: HEMATOLOGY ONCOLOGY | Facility: CLINIC | Age: 85
End: 2020-11-24

## 2020-11-24 DIAGNOSIS — K86.89 MASS OF PANCREAS: Primary | ICD-10-CM

## 2020-11-25 ENCOUNTER — PATIENT OUTREACH (OUTPATIENT)
Dept: HEMATOLOGY ONCOLOGY | Facility: CLINIC | Age: 85
End: 2020-11-25

## 2020-11-25 ENCOUNTER — TELEPHONE (OUTPATIENT)
Dept: CARDIOLOGY CLINIC | Facility: CLINIC | Age: 85
End: 2020-11-25

## 2020-11-25 ENCOUNTER — TELEPHONE (OUTPATIENT)
Dept: PALLIATIVE MEDICINE | Facility: CLINIC | Age: 85
End: 2020-11-25

## 2020-11-25 ENCOUNTER — DOCUMENTATION (OUTPATIENT)
Dept: CARDIOLOGY CLINIC | Facility: CLINIC | Age: 85
End: 2020-11-25

## 2020-11-25 DIAGNOSIS — K86.89 MASS OF PANCREAS: Primary | ICD-10-CM

## 2020-11-30 ENCOUNTER — HOSPITAL ENCOUNTER (OUTPATIENT)
Dept: CT IMAGING | Facility: HOSPITAL | Age: 85
Discharge: HOME | End: 2020-11-30
Attending: INTERNAL MEDICINE
Payer: COMMERCIAL

## 2020-11-30 ENCOUNTER — LAB (OUTPATIENT)
Dept: LAB | Facility: CLINIC | Age: 85
End: 2020-11-30
Payer: COMMERCIAL

## 2020-11-30 ENCOUNTER — ANTICOAG VISIT (OUTPATIENT)
Dept: CARDIOLOGY CLINIC | Facility: CLINIC | Age: 85
End: 2020-11-30

## 2020-11-30 DIAGNOSIS — K86.89 MASS OF PANCREAS: ICD-10-CM

## 2020-11-30 DIAGNOSIS — Z79.891 LONG-TERM CURRENT USE OF OPIATE ANALGESIC: ICD-10-CM

## 2020-11-30 DIAGNOSIS — Z95.2 HISTORY OF MITRAL VALVE REPLACEMENT: ICD-10-CM

## 2020-11-30 LAB
ALBUMIN SERPL BCP-MCNC: 3.5 G/DL (ref 3.5–5)
ALP SERPL-CCNC: 47 U/L (ref 46–116)
ALT SERPL W P-5'-P-CCNC: 20 U/L (ref 12–78)
ANION GAP SERPL CALCULATED.3IONS-SCNC: 7 MMOL/L (ref 4–13)
AST SERPL W P-5'-P-CCNC: 20 U/L (ref 5–45)
BASOPHILS # BLD AUTO: 0.06 THOUSANDS/ΜL (ref 0–0.1)
BASOPHILS NFR BLD AUTO: 1 % (ref 0–1)
BILIRUB SERPL-MCNC: 0.96 MG/DL (ref 0.2–1)
BUN SERPL-MCNC: 29 MG/DL (ref 5–25)
CALCIUM SERPL-MCNC: 10.2 MG/DL (ref 8.3–10.1)
CHLORIDE SERPL-SCNC: 100 MMOL/L (ref 100–108)
CO2 SERPL-SCNC: 32 MMOL/L (ref 21–32)
CREAT SERPL-MCNC: 1.28 MG/DL (ref 0.6–1.3)
EOSINOPHIL # BLD AUTO: 0.09 THOUSAND/ΜL (ref 0–0.61)
EOSINOPHIL NFR BLD AUTO: 1 % (ref 0–6)
ERYTHROCYTE [DISTWIDTH] IN BLOOD BY AUTOMATED COUNT: 13.5 % (ref 11.6–15.1)
GFR SERPL CREATININE-BSD FRML MDRD: 38 ML/MIN/1.73SQ M
GLUCOSE P FAST SERPL-MCNC: 105 MG/DL (ref 65–99)
HCT VFR BLD AUTO: 40 % (ref 34.8–46.1)
HGB BLD-MCNC: 12.5 G/DL (ref 11.5–15.4)
IMM GRANULOCYTES # BLD AUTO: 0.02 THOUSAND/UL (ref 0–0.2)
IMM GRANULOCYTES NFR BLD AUTO: 0 % (ref 0–2)
LYMPHOCYTES # BLD AUTO: 1.76 THOUSANDS/ΜL (ref 0.6–4.47)
LYMPHOCYTES NFR BLD AUTO: 21 % (ref 14–44)
MCH RBC QN AUTO: 29.7 PG (ref 26.8–34.3)
MCHC RBC AUTO-ENTMCNC: 31.3 G/DL (ref 31.4–37.4)
MCV RBC AUTO: 95 FL (ref 82–98)
MONOCYTES # BLD AUTO: 0.83 THOUSAND/ΜL (ref 0.17–1.22)
MONOCYTES NFR BLD AUTO: 10 % (ref 4–12)
NEUTROPHILS # BLD AUTO: 5.84 THOUSANDS/ΜL (ref 1.85–7.62)
NEUTS SEG NFR BLD AUTO: 67 % (ref 43–75)
NRBC BLD AUTO-RTO: 0 /100 WBCS
PLATELET # BLD AUTO: 306 THOUSANDS/UL (ref 149–390)
PMV BLD AUTO: 12.7 FL (ref 8.9–12.7)
POTASSIUM SERPL-SCNC: 3.4 MMOL/L (ref 3.5–5.3)
PROT SERPL-MCNC: 6.9 G/DL (ref 6.4–8.2)
RBC # BLD AUTO: 4.21 MILLION/UL (ref 3.81–5.12)
SODIUM SERPL-SCNC: 139 MMOL/L (ref 136–145)
WBC # BLD AUTO: 8.6 THOUSAND/UL (ref 4.31–10.16)

## 2020-11-30 PROCEDURE — 80053 COMPREHEN METABOLIC PANEL: CPT

## 2020-11-30 PROCEDURE — G1004 CDSM NDSC: HCPCS

## 2020-11-30 PROCEDURE — 71260 CT THORAX DX C+: CPT

## 2020-11-30 PROCEDURE — 85025 COMPLETE CBC W/AUTO DIFF WBC: CPT

## 2020-11-30 PROCEDURE — 86301 IMMUNOASSAY TUMOR CA 19-9: CPT

## 2020-11-30 RX ADMIN — IOHEXOL 85 ML: 350 INJECTION, SOLUTION INTRAVENOUS at 15:00

## 2020-12-01 ENCOUNTER — TELEPHONE (OUTPATIENT)
Dept: HEMATOLOGY ONCOLOGY | Facility: CLINIC | Age: 85
End: 2020-12-01

## 2020-12-01 LAB — CANCER AG19-9 SERPL-ACNC: ABNORMAL U/ML (ref 0–35)

## 2020-12-02 ENCOUNTER — CONSULT (OUTPATIENT)
Dept: HEMATOLOGY ONCOLOGY | Facility: CLINIC | Age: 85
End: 2020-12-02
Payer: COMMERCIAL

## 2020-12-02 VITALS
HEART RATE: 57 BPM | TEMPERATURE: 97.3 F | SYSTOLIC BLOOD PRESSURE: 102 MMHG | BODY MASS INDEX: 23.12 KG/M2 | DIASTOLIC BLOOD PRESSURE: 60 MMHG | WEIGHT: 135.4 LBS | HEIGHT: 64 IN | RESPIRATION RATE: 18 BRPM

## 2020-12-02 DIAGNOSIS — C25.9 PANCREATIC ADENOCARCINOMA (HCC): Primary | ICD-10-CM

## 2020-12-02 PROCEDURE — 1036F TOBACCO NON-USER: CPT | Performed by: INTERNAL MEDICINE

## 2020-12-02 PROCEDURE — 99205 OFFICE O/P NEW HI 60 MIN: CPT | Performed by: INTERNAL MEDICINE

## 2020-12-02 PROCEDURE — 3074F SYST BP LT 130 MM HG: CPT | Performed by: INTERNAL MEDICINE

## 2020-12-02 PROCEDURE — 3078F DIAST BP <80 MM HG: CPT | Performed by: INTERNAL MEDICINE

## 2020-12-02 RX ORDER — OXYCODONE AND ACETAMINOPHEN 2.5; 325 MG/1; MG/1
1 TABLET ORAL EVERY 4 HOURS PRN
Qty: 30 TABLET | Refills: 0 | Status: SHIPPED | OUTPATIENT
Start: 2020-12-02

## 2020-12-03 ENCOUNTER — TELEPHONE (OUTPATIENT)
Dept: GASTROENTEROLOGY | Facility: CLINIC | Age: 85
End: 2020-12-03

## 2020-12-04 DIAGNOSIS — C25.9 PANCREATIC ADENOCARCINOMA (HCC): Primary | ICD-10-CM

## 2020-12-07 DIAGNOSIS — I10 ESSENTIAL HYPERTENSION: ICD-10-CM

## 2020-12-07 DIAGNOSIS — I10 HYPERTENSION, UNSPECIFIED TYPE: ICD-10-CM

## 2020-12-07 RX ORDER — LOSARTAN POTASSIUM AND HYDROCHLOROTHIAZIDE 12.5; 1 MG/1; MG/1
TABLET ORAL
Qty: 90 TABLET | Refills: 0 | Status: SHIPPED | OUTPATIENT
Start: 2020-12-07

## 2020-12-07 RX ORDER — ATENOLOL 50 MG/1
TABLET ORAL
Qty: 90 TABLET | Refills: 0 | Status: SHIPPED | OUTPATIENT
Start: 2020-12-07

## 2021-01-18 ENCOUNTER — ANTICOAG VISIT (OUTPATIENT)
Dept: CARDIOLOGY CLINIC | Facility: CLINIC | Age: 86
End: 2021-01-18

## 2021-01-18 DIAGNOSIS — Z79.891 LONG-TERM CURRENT USE OF OPIATE ANALGESIC: ICD-10-CM

## 2021-01-18 DIAGNOSIS — I48.91 ATRIAL FIBRILLATION, UNSPECIFIED TYPE (HCC): ICD-10-CM

## 2021-01-18 DIAGNOSIS — Z95.2 HISTORY OF MITRAL VALVE REPLACEMENT: ICD-10-CM

## 2021-02-12 DIAGNOSIS — Z23 ENCOUNTER FOR IMMUNIZATION: ICD-10-CM

## 2024-02-06 NOTE — PROGRESS NOTES
1/29/18, tc to pt, denies any missed doses  Will take 7 5 mg x 1 today in place of 5 mg, then take 5 mg daily, inr due 2 weeks   bharathi 65.04

## (undated) DEVICE — SUT VICRYL 0 UR-6 27 IN J603H

## (undated) DEVICE — STRL UNIVERSAL MINOR GENERAL: Brand: CARDINAL HEALTH

## (undated) DEVICE — GLOVE SRG BIOGEL ORTHOPEDIC 8

## (undated) DEVICE — SCD SEQUENTIAL COMPRESSION COMFORT SLEEVE MEDIUM KNEE LENGTH: Brand: KENDALL SCD

## (undated) DEVICE — CHLORAPREP HI-LITE 26ML ORANGE

## (undated) DEVICE — PLUMEPEN PRO 10FT

## (undated) DEVICE — SUT MONOCRYL 4-0 PS-2 27 IN Y426H

## (undated) DEVICE — ADHESIVE SKN CLSR HISTOACRYL FLEX 0.5ML LF

## (undated) DEVICE — INTENDED FOR TISSUE SEPARATION, AND OTHER PROCEDURES THAT REQUIRE A SHARP SURGICAL BLADE TO PUNCTURE OR CUT.: Brand: BARD-PARKER SAFETY BLADES SIZE 15, STERILE

## (undated) DEVICE — LIGHT HANDLE COVER SLEEVE DISP BLUE STELLAR

## (undated) DEVICE — REM POLYHESIVE ADULT PATIENT RETURN ELECTRODE: Brand: VALLEYLAB

## (undated) DEVICE — NEEDLE 22 G X 1 1/2 SAFETY